# Patient Record
Sex: MALE | Race: WHITE | Employment: PART TIME | URBAN - METROPOLITAN AREA
[De-identification: names, ages, dates, MRNs, and addresses within clinical notes are randomized per-mention and may not be internally consistent; named-entity substitution may affect disease eponyms.]

---

## 2017-07-07 PROBLEM — F10.21 ALCOHOL DEPENDENCE IN REMISSION (HCC): Status: ACTIVE | Noted: 2017-07-07

## 2017-07-07 PROBLEM — F31.61 BIPOLAR AFFECTIVE DISORDER, MIXED, MILD (HCC): Status: ACTIVE | Noted: 2017-07-07

## 2018-01-15 ENCOUNTER — OFFICE VISIT (OUTPATIENT)
Dept: SURGERY | Age: 48
End: 2018-01-15

## 2018-01-15 VITALS
SYSTOLIC BLOOD PRESSURE: 133 MMHG | RESPIRATION RATE: 16 BRPM | DIASTOLIC BLOOD PRESSURE: 87 MMHG | HEART RATE: 85 BPM | HEIGHT: 69 IN | WEIGHT: 315 LBS | BODY MASS INDEX: 46.65 KG/M2 | OXYGEN SATURATION: 96 %

## 2018-01-15 DIAGNOSIS — R79.89 LOW TESTOSTERONE: ICD-10-CM

## 2018-01-15 DIAGNOSIS — R25.1 TREMORS OF NERVOUS SYSTEM: ICD-10-CM

## 2018-01-15 DIAGNOSIS — M19.042 ARTHRITIS OF BOTH HANDS: ICD-10-CM

## 2018-01-15 DIAGNOSIS — I63.9 CEREBROVASCULAR ACCIDENT (CVA), UNSPECIFIED MECHANISM (HCC): ICD-10-CM

## 2018-01-15 DIAGNOSIS — M19.041 ARTHRITIS OF BOTH HANDS: ICD-10-CM

## 2018-01-15 DIAGNOSIS — M62.08 DIASTASIS RECTI: ICD-10-CM

## 2018-01-15 DIAGNOSIS — I10 ESSENTIAL HYPERTENSION: ICD-10-CM

## 2018-01-15 DIAGNOSIS — E78.00 HYPERCHOLESTEREMIA: ICD-10-CM

## 2018-01-15 DIAGNOSIS — G47.30 SLEEP APNEA, UNSPECIFIED TYPE: ICD-10-CM

## 2018-01-15 DIAGNOSIS — F10.21 ALCOHOL DEPENDENCE IN REMISSION (HCC): ICD-10-CM

## 2018-01-15 DIAGNOSIS — F31.9 BIPOLAR 1 DISORDER (HCC): ICD-10-CM

## 2018-01-15 DIAGNOSIS — F31.61 BIPOLAR AFFECTIVE DISORDER, MIXED, MILD (HCC): ICD-10-CM

## 2018-01-15 DIAGNOSIS — E11.9 TYPE 2 DIABETES MELLITUS WITHOUT COMPLICATION, WITHOUT LONG-TERM CURRENT USE OF INSULIN (HCC): ICD-10-CM

## 2018-01-15 DIAGNOSIS — M17.0 ARTHRITIS OF BOTH KNEES: ICD-10-CM

## 2018-01-15 DIAGNOSIS — M47.819 ARTHRITIS OF SPINE: ICD-10-CM

## 2018-01-15 DIAGNOSIS — E66.01 OBESITY, MORBID (HCC): Primary | ICD-10-CM

## 2018-01-15 DIAGNOSIS — E66.01 MORBID OBESITY WITH BMI OF 45.0-49.9, ADULT (HCC): ICD-10-CM

## 2018-01-15 DIAGNOSIS — K21.9 GASTROESOPHAGEAL REFLUX DISEASE WITHOUT ESOPHAGITIS: ICD-10-CM

## 2018-01-15 DIAGNOSIS — Z86.79 HX OF CARDIOMYOPATHY: ICD-10-CM

## 2018-01-15 PROBLEM — E11.40 TYPE 2 DIABETES MELLITUS WITH DIABETIC NEUROPATHY (HCC): Status: ACTIVE | Noted: 2018-01-15

## 2018-01-15 RX ORDER — ATORVASTATIN CALCIUM 20 MG/1
40 TABLET, FILM COATED ORAL
COMMUNITY

## 2018-01-15 RX ORDER — TESTOSTERONE CYPIONATE 200 MG/ML
INJECTION INTRAMUSCULAR
COMMUNITY
End: 2019-02-06

## 2018-01-15 RX ORDER — DICLOFENAC POTASSIUM 25 MG/1
25 CAPSULE, LIQUID FILLED ORAL 4 TIMES DAILY
COMMUNITY
End: 2019-02-06

## 2018-01-15 RX ORDER — AZATHIOPRINE 50 MG/1
100 TABLET ORAL
COMMUNITY
End: 2019-02-06

## 2018-01-15 RX ORDER — VALSARTAN 80 MG/1
TABLET ORAL DAILY
COMMUNITY
End: 2019-01-31

## 2018-01-15 RX ORDER — PHENOL/SODIUM PHENOLATE
40 AEROSOL, SPRAY (ML) MUCOUS MEMBRANE 2 TIMES DAILY
COMMUNITY

## 2018-01-15 RX ORDER — METFORMIN HYDROCHLORIDE 500 MG/1
TABLET ORAL 2 TIMES DAILY WITH MEALS
Status: ON HOLD | COMMUNITY
End: 2018-05-01

## 2018-01-15 NOTE — MR AVS SNAPSHOT
Kym Hernández 
 
 
 [address] [facility] 
[phone] Patient: Issa Kimble MRN: [id number] :1970 Visit Information Date & Time Provider Department Dept. Phone Encounter #  
 1/15/2018  3:00 PM Philip Chawla MD [place] [employer] [facility] Pamela Forman [phone] [id number] Follow-up Instructions Return in about 4 months (around 5/15/2018). Your Appointments 4/10/2018  2:30 PM  
NUTRITION COUNSELING with TSS NUTRI VISIT PEN [place] [facility] Pamela Forman ([address]) Appt Note: 1/ pt [address] per Gina Wan to see dietitian every 3 months  
 [address] [facility] [address] [address]  
  
   
 [address] [place] [address] Upcoming Health Maintenance Date Due Pneumococcal 19-64 Medium Risk (1 of 1 - PPSV23) 10/13/1987 DTaP/Tdap/Td series (1 - Tdap) 10/13/1989 Influenza Age 5 to Adult 2017 Allergies as of 1/15/2018  Review Complete On: 1/15/2018 By: Philip Chawla MD  
 No Known Allergies Current Immunizations  Never Reviewed No immunizations on file. Not reviewed this visit You Were Diagnosed With   
  
 Codes Comments Hx of cardiomyopathy     ICD-10-CM: Z86.79 
ICD-9-CM: V12.59 Vitals BP Pulse Resp Height(growth percentile) Weight(growth percentile) SpO2  
 133/87 (BP 1 Location: Left arm, BP Patient Position: Sitting) 85 16 5' 9\" (1.753 m) 320 lb (145.2 kg) 96% BMI Smoking Status 47.26 kg/m2 Never Smoker Vitals History BMI and BSA Data Body Mass Index Body Surface Area  
 47.26 kg/m 2 2.66 m 2 Preferred Pharmacy Pharmacy Name Phone [address] [facility], [address] [phone] Your Updated Medication List  
  
   
 This list is accurate as of: 1/15/18  4:52 PM.  Always use your most recent med list.  
  
  
  
  
 diclofenac potassium 25 mg capsule Commonly known as:  [employer] Take 25 mg by mouth. DIOVAN 80 mg tablet Generic drug:  valsartan Take  by mouth daily. DULoxetine 60 mg capsule Commonly known as:  CYMBALTA Take 1 Cap by mouth daily. Take with 30 mg dose for a total of 90 mg  
  
 * gabapentin 600 mg tablet Commonly known as:  NEURONTIN Take 1,200 mg by mouth two (2) times a day. Indications: NEUROPATHIC PAIN  
  
 * gabapentin 600 mg tablet Commonly known as:  NEURONTIN Take 600 mg by mouth daily (with lunch). GLUCOPHAGE 500 mg tablet Generic drug:  metFORMIN Take  by mouth two (2) times daily (with meals). IMURAN 50 mg tablet Generic drug:  azaTHIOprine Take 50 mg by mouth daily. LIPITOR 20 mg tablet Generic drug:  atorvastatin Take  by mouth daily. Omeprazole delayed release 20 mg tablet Commonly known as:  PRILOSEC D/R Take 20 mg by mouth daily. OXcarbaxepine 600 mg tablet Commonly known as:  TRILEPTAL Take 1,200 mg by mouth two (2) times a day. risperiDONE 4 mg tablet Commonly known as:  RisperDAL Take 4 mg by mouth nightly. Indications: MIXED BIPOLAR I DISORDER  
  
 SEROquel 300 mg tablet Generic drug:  QUEtiapine Take 300 mg by mouth nightly. Indications: BIPOLAR DISORDER IN REMISSION  
  
 testosterone cypionate 200 mg/mL injection Commonly known as:  DEPOTESTOTERONE CYPIONATE  
by IntraMUSCular route once. traZODone 50 mg tablet Commonly known as:  Ruby Pam Take 1 Tab by mouth nightly as needed for Sleep. WELLBUTRIN  mg XL tablet Generic drug:  buPROPion XL Take 300 mg by mouth every morning. * Notice: This list has 2 medication(s) that are the same as other medications prescribed for you.  Read the directions carefully, and ask your doctor or other care provider to review them with you. Follow-up Instructions Return in about 4 months (around 5/15/2018). To-Do List   
 06/15/2018 3:30 PM  
  Appointment with Neto Antonio MD at [address] [place] [address] ([phone]) Patient Instructions Body Mass Index: Care Instructions Your Care Instructions Body mass index (BMI) can help you see if your weight is raising your risk for health problems. It uses a formula to compare how much you weigh with how tall you are. · A BMI lower than 18.5 is considered underweight. · A BMI between 18.5 and 24.9 is considered healthy. · A BMI between 25 and 29.9 is considered overweight. A BMI of 30 or higher is considered obese. If your BMI is in the normal range, it means that you have a lower risk for weight-related health problems. If your BMI is in the overweight or obese range, you may be at increased risk for weight-related health problems, such as high blood pressure, heart disease, stroke, arthritis or joint pain, and diabetes. If your BMI is in the underweight range, you may be at increased risk for health problems such as fatigue, lower protection (immunity) against illness, muscle loss, bone loss, hair loss, and hormone problems. BMI is just one measure of your risk for weight-related health problems. You may be at higher risk for health problems if you are not active, you eat an unhealthy diet, or you drink too much alcohol or use tobacco products. Follow-up care is a key part of your treatment and safety. Be sure to make and go to all appointments, and call your doctor if you are having problems. It's also a good idea to know your test results and keep a list of the medicines you take. How can you care for yourself at home? · Practice healthy eating habits. This includes eating plenty of fruits, vegetables, whole grains, lean protein, and low-fat dairy. · If your doctor recommends it, get more exercise. Walking is a good choice. Bit by bit, increase the amount you walk every day. Try for at least 30 minutes on most days of the week. · Do not smoke. Smoking can increase your risk for health problems. If you need help quitting, talk to your doctor about stop-smoking programs and medicines. These can increase your chances of quitting for good. · Limit alcohol to 2 drinks a day for men and 1 drink a day for women. Too much alcohol can cause health problems. If you have a BMI higher than 25 · Your doctor may do other tests to check your risk for weight-related health problems. This may include measuring the distance around your waist. A waist measurement of more than 40 inches in men or 35 inches in women can increase the risk of weight-related health problems. · Talk with your doctor about steps you can take to stay healthy or improve your health. You may need to make lifestyle changes to lose weight and stay healthy, such as changing your diet and getting regular exercise. If you have a BMI lower than 18.5 · Your doctor may do other tests to check your risk for health problems. · Talk with your doctor about steps you can take to stay healthy or improve your health. You may need to make lifestyle changes to gain or maintain weight and stay healthy, such as getting more healthy foods in your diet and doing exercises to build muscle. Where can you learn more? Go to http://deepak-zachary.info/. Enter S176 in the search box to learn more about \"Body Mass Index: Care Instructions. \" Current as of: October 13, 2016 Content Version: 11.4 © 1926-8133 [employer]. Care instructions adapted under license by [employer] (which disclaims liability or warranty for this information).  If you have questions about a medical condition or this instruction, always ask your healthcare professional. [employer] disclaims any warranty or liability for your use of this information. Introducing [facility]! [employer] introduces Aicent patient portal. Now you can access parts of your medical record, email your doctor's office, and request medication refills online. 1. In your internet browser, go to [email or web address] 2. Click on the First Time User? Click Here link in the Sign In box. You will see the New Member Sign Up page. 3. Enter your Aicent Access Code exactly as it appears below. You will not need to use this code after youve completed the sign-up process. If you do not sign up before the expiration date, you must request a new code. · Aicent Access Code: [email or web address] Expires: 4/11/2018  3:05 PM 
 
4. Enter the last four digits of your Social Security Number (xxxx) and Date of Birth (mm/dd/yyyy) as indicated and click Submit. You will be taken to the next sign-up page. 5. Create a Aicent ID. This will be your Aicent login ID and cannot be changed, so think of one that is secure and easy to remember. 6. Create a Aicent password. You can change your password at any time. 7. Enter your Password Reset Question and Answer. This can be used at a later time if you forget your password. 8. Enter your e-mail address. You will receive e-mail notification when new information is available in [address] 9. Click Sign Up. You can now view and download portions of your medical record. 10. Click the Download Summary menu link to download a portable copy of your medical information. If you have questions, please visit the Frequently Asked Questions section of the Aicent website. Remember, Aicent is NOT to be used for urgent needs. For medical emergencies, dial 911. Now available from your iPhone and Android! Please provide this summary of care documentation to your next provider. Your primary care clinician is listed as Tj France. If you have any questions after today's visit, please call [phone].

## 2018-01-15 NOTE — PATIENT INSTRUCTIONS
Body Mass Index: Care Instructions  Your Care Instructions    Body mass index (BMI) can help you see if your weight is raising your risk for health problems. It uses a formula to compare how much you weigh with how tall you are. · A BMI lower than 18.5 is considered underweight. · A BMI between 18.5 and 24.9 is considered healthy. · A BMI between 25 and 29.9 is considered overweight. A BMI of 30 or higher is considered obese. If your BMI is in the normal range, it means that you have a lower risk for weight-related health problems. If your BMI is in the overweight or obese range, you may be at increased risk for weight-related health problems, such as high blood pressure, heart disease, stroke, arthritis or joint pain, and diabetes. If your BMI is in the underweight range, you may be at increased risk for health problems such as fatigue, lower protection (immunity) against illness, muscle loss, bone loss, hair loss, and hormone problems. BMI is just one measure of your risk for weight-related health problems. You may be at higher risk for health problems if you are not active, you eat an unhealthy diet, or you drink too much alcohol or use tobacco products. Follow-up care is a key part of your treatment and safety. Be sure to make and go to all appointments, and call your doctor if you are having problems. It's also a good idea to know your test results and keep a list of the medicines you take. How can you care for yourself at home? · Practice healthy eating habits. This includes eating plenty of fruits, vegetables, whole grains, lean protein, and low-fat dairy. · If your doctor recommends it, get more exercise. Walking is a good choice. Bit by bit, increase the amount you walk every day. Try for at least 30 minutes on most days of the week. · Do not smoke. Smoking can increase your risk for health problems. If you need help quitting, talk to your doctor about stop-smoking programs and medicines. These can increase your chances of quitting for good. · Limit alcohol to 2 drinks a day for men and 1 drink a day for women. Too much alcohol can cause health problems. If you have a BMI higher than 25  · Your doctor may do other tests to check your risk for weight-related health problems. This may include measuring the distance around your waist. A waist measurement of more than 40 inches in men or 35 inches in women can increase the risk of weight-related health problems. · Talk with your doctor about steps you can take to stay healthy or improve your health. You may need to make lifestyle changes to lose weight and stay healthy, such as changing your diet and getting regular exercise. If you have a BMI lower than 18.5  · Your doctor may do other tests to check your risk for health problems. · Talk with your doctor about steps you can take to stay healthy or improve your health. You may need to make lifestyle changes to gain or maintain weight and stay healthy, such as getting more healthy foods in your diet and doing exercises to build muscle. Where can you learn more? Go to http://deepak-zachary.info/. Enter S176 in the search box to learn more about \"Body Mass Index: Care Instructions. \"  Current as of: October 13, 2016  Content Version: 11.4  © 4156-9147 [employer]. Care instructions adapted under license by [employer] (which disclaims liability or warranty for this information). If you have questions about a medical condition or this instruction, always ask your healthcare professional. [address] any warranty or liability for your use of this information.

## 2018-01-16 NOTE — PROGRESS NOTES
Bariatric Surgery Consultation    Subjective: The patient is a 50 y.o. obese male with a Body mass index is 47.26 kg/(m^2). Shakira Gone The patient is at his heaviest weight for the past 10 years. he has been overweight since age 25.   he has been   considering surgery since this last year. he desires surgery at this time because of multiple health concerns and their lifestyle issues which are hindered by their weight. he has been referred   by his family physician Dr Osvaldo Pablo for evaluation and treatment of their obesity via surgical intervention. Aurora Chiu has tried multiple diets in his lifetime most recently tried   physician supervised, behavior modification, unsupervised diets, Weight Watchers and Atkins    Bariatric comorbidities present are   Patient Active Problem List   Diagnosis Code    Bipolar affective disorder, mixed, mild ([facility]) F31.61    Alcohol dependence in remission ([address] F10.21    Obesity, morbid ([address] E66.01    Morbid obesity ([address] E66.01    Morbid obesity with BMI of 45.0-49.9, adult ([address] E66.01, Z68.42    Low testosterone E34.9    Bipolar 1 disorder ([address] F31.9    GERD (gastroesophageal reflux disease) K21.9    Hypertension I10    Tremors of nervous system R25.1    Hypercholesteremia E78.00    Arthritis of both knees M17.0    Arthritis of spine ([address] M46.90    Arthritis of both hands M19.041, M19.042    Migraines G43.909    Diabetes ([address] E11.9    Sleep apnea G47.30    Hx of cardiomyopathy Z86.79    Diastasis recti M62.08    Type 2 diabetes mellitus with diabetic neuropathy (HCC) E11.40    CVA (cerebral vascular accident) ([address] I63.9       The patient is considering laparoscopic sleeve gastrectomy for surgical weight loss due to their ineffective progress with medical forms of weight loss and the urging of their physician who cares for   their primary medical issues.  The patient  now presents  for consideration for weight loss surgery understanding the benefits of this over a medical approach of weight loss as was discussed in our   presentation on weight loss surgery. They have discussed their plans both with their family and primary care physician who is in support of their pursuit of such. The patient has had many health   issues as of late but denies and gastrointestinal disturbances other than what is outlined below in their review of symptoms. All of their prior evaluations available by both their PCP's and specialists   physicians have been reviewed today either in the Care Everywhere portal or scanned under the media tab. I have spent a large portion of my initial consultation today reviewing the patients current dietary habits which have contributed to their health issues and obesity. I have suggested to them personally a dietary regimen that they can initiate now to help with their status as it pertains to their weight. They understand that the most important aspect of their journey   through their weight loss endeavor will be their adherence to a new lifestyle of healthy eating behavior. They also understand that an adherence to an exercise program will not only help with weight loss but is ultimately important in weight maintenance. The patients goal weight is 190lb. These goals are consistent with expected outcomes of their desired operation. his Medical goals are resolution of these health issues.         Patient Active Problem List    Diagnosis Date Noted    Obesity, morbid ([address] 01/15/2018    Type 2 diabetes mellitus with diabetic neuropathy ([facility] 01/15/2018    Morbid obesity ([address]     Morbid obesity with BMI of 45.0-49.9, adult (HCC)     Low testosterone     Bipolar 1 disorder (HCC)     GERD (gastroesophageal reflux disease)     Hypertension     Tremors of nervous system     Hypercholesteremia     Arthritis of both knees     Arthritis of spine (HCC)     Arthritis of both hands     Migraines     Diabetes ([address]     Sleep apnea     Hx of cardiomyopathy     Diastasis recti     [facility]ce[facility]) [facility]     Bipolar affective disorder, mixed, mild ([facility] 07/07/2017    [facility] dependence [facility] remission [facility] 07/07/2017     Past Surgical History:   Procedure Laterality Date    HX APPENDECTOMY      HX HEENT      TONSILS    HX ORTHOPAEDIC      CTR both hands    HX ORTHOPAEDIC      cervical fusion    HX ORTHOPAEDIC      TORN MENISCUS      Social History   Substance Use Topics    Smoking status: Never Smoker    Smokeless tobacco: Never Used    Alcohol use No      Comment: recovering alcoholic      Family History   Problem Relation Age of Onset    Heart Disease Mother     Hypertension Mother     Elevated Lipids Mother     Heart Disease Father     Hypertension Father     Elevated Lipids Father       Current Outpatient Prescriptions   Medication Sig Dispense Refill    valsartan (DIOVAN) 80 mg tablet Take  by mouth daily.  metFORMIN (GLUCOPHAGE) 500 mg tablet Take  by mouth two (2) times daily (with meals).  Omeprazole delayed release (PRILOSEC D/R) 20 mg tablet Take 20 mg by mouth daily.  atorvastatin (LIPITOR) 20 mg tablet Take  by mouth daily.  diclofenac potassium (ZIPSOR) 25 mg capsule Take 25 mg by mouth.  azaTHIOprine (IMURAN) 50 mg tablet Take 50 mg by mouth daily.  testosterone cypionate (DEPOTESTOTERONE CYPIONATE) 200 mg/mL injection by IntraMUSCular route once.  traZODone (DESYREL) 50 mg tablet Take 1 Tab by mouth nightly as needed for Sleep. 30 Tab 5    DULoxetine (CYMBALTA) 60 mg capsule Take 1 Cap by mouth daily. Take with 30 mg dose for a total of 90 mg (Patient taking differently: Take 60 mg by mouth daily.) 30 Cap 8    risperiDONE (RISPERDAL) 4 mg tablet Take 4 mg by mouth nightly. Indications: MIXED BIPOLAR I DISORDER      QUEtiapine (SEROQUEL) 300 mg tablet Take 300 mg by mouth nightly.  Indications: BIPOLAR DISORDER IN REMISSION      buPROPion XL (WELLBUTRIN XL) 300 mg XL tablet Take 300 mg by mouth every morning.  OXcarbaxepine (TRILEPTAL) 600 mg tablet Take 1,200 mg by mouth two (2) times a day.  gabapentin (NEURONTIN) 600 mg tablet Take 1,200 mg by mouth two (2) times a day. Indications: NEUROPATHIC PAIN      gabapentin (NEURONTIN) 600 mg tablet Take 600 mg by mouth daily (with lunch).        No Known Allergies       Review of Systems:       General - No history or complaints of unexpected fever, chills, or weight loss  Head/Neck - No history or complaints of headache, diplopia, dysphagia, hearing loss  Cardiac - No history or complaints of chest pain, palpitations, murmur, or shortness of breath  Pulmonary - No history or complaints of shortness of breath, productive cough, hemoptysis  Gastrointestinal - moderate reflux,no  abdominal pain, obstipation/constipation or blood per rectum  Genitourinary - No history or complaints of hematuria/dysuria, stress urinary incontinence symptoms, or renal lithiasis  Musculoskeletal - severe joint pain in their knees,  no muscular weakness  Hematologic - No history or complaints of bleeding disorders,  No blood transfusions  Neurologic - No history or complaints of  migraine headaches, seizure activity, syncopal episodes, TIA or stroke  Integumentary - No history or complaints of rashes, abnormal nevi, skin cancer  Gynecological - n/a               Objective:     Visit Vitals    /87 (BP 1 Location: Left arm, BP Patient Position: Sitting)    Pulse 85    Resp 16    Ht 5' 9\" (1.753 m)    Wt 145.2 kg (320 lb)    SpO2 96%    BMI 47.26 kg/m2       Physical Examination: General appearance - alert, well appearing, and in no distress and oriented to person, place, and time  Mental status - alert, oriented to person, place, and time, normal mood, behavior, speech, dress, motor activity, and thought processes  Eyes - pupils equal and reactive, extraocular eye movements intact, sclera anicteric, left eye normal, right eye normal  Ears - bilateral TM's and external ear canals normal, right ear normal, left ear normal  Nose - normal and patent, no erythema, discharge or polyps  Mouth - mucous membranes moist, pharynx normal without lesions  Neck - supple, no significant adenopathy  Lymphatics - no palpable lymphadenopathy, no hepatosplenomegaly  Chest - clear to auscultation, no wheezes, rales or rhonchi, symmetric air entry  Heart - normal rate, regular rhythm, normal S1, S2, no murmurs, rubs, clicks or gallops  Abdomen - soft, nontender, nondistended, no masses or organomegaly, massive central obesity with sever diastasis recti  Back exam - full range of motion, no tenderness, palpable spasm or pain on motion  Neurological - alert, oriented, normal speech, no focal findings or movement disorder noted  Musculoskeletal - abnormal exam of both lower extremities with limited ROM  Extremities - pedal edema 1+     Labs:       No results found for this or any previous visit (from the past 1440 hour(s)). Assessment:     Morbid obesity with comorbidity    Plan:     laparoscopic sleeve gastrectomy    This is a 50 y.o. male with a BMI of Body mass index is 47.26 kg/(m^2). and the weight-related co-morbidties as noted below. Ed Oniel meets the NIH criteria for bariatric surgery based upon the BMI of Body mass index is 47.26 kg/(m^2). and multiple weight-related co-morbidties. Malcolm Engle has elected laparoscopic sleeve gastrectomy as his intervention of choice for treatment of morbid obestiy through surgical means secondary to its safety profile, rapid return to work  and decreases in operative risks over gastric bypass. In the office today, following Melo's history and physical examination, a 30 minute discussion regarding the anatomic alterations for the laparoscopic sleeve gastrectomy was undertaken.  The dietary expectations and the patient and physician dependent factors for success were thoroughly discussed, to include the need for interval follow-up and long-term dietary changes associated with success. The possible complications of the sleeve gastrectomy  were also discussed, to include;death, DVT/PE, staple line leak, bleeding, stricture formation, infection, nutritional deficiencies and sleeve dilation. Specific weight related outcomes for success were also discussed with an emphasis on careful and close follow-up with the first year and eating behavior modification as the baseline and cyclical hunger return. The patient expressed an understanding of the above factors, and his questions were answered in their entirety. In addition, the patient attended a 1.5 hour power point seminar regarding obesity, surgical weight loss including, adjustable gastric band, gastric bypass, and sleeve gastrectomy. This discussion contrasted the different surgical techniques, mechanisms of actions and expected outcomes, and surgical and medical risks associated with each procedure. During this seminar, there was a long question and answer session where each questions was answered until there were no additional questions. Today, the patient had all of his questions answered and desires to proceed with  bariatric surgery initially choosing sleeve gastrectomy as his surgical option. Due to the patients extreme health issues he will need to be cleared for surgery from a cardiac standpoint and definitely need to lose weight in this 1 year process. Secondary Diagnoses:     Adult Onset Diabetes - The patient has vinicius given a very low carbohydrate diet preoperatively along with instructions to monitor their blood sugars on a regular daily basis.  When  their surgery is performed  we will be monitoring the patient with sliding scale insulin and accuchecks.  Based on those values we will determine whether the patient needs a reduction of those medications postoperatively or total removal of those medications on discharge.  We will have the patient continue accuchecks postoperatively while at home also and report to me or their family physician for appropriate adjustments as needed.  The patient also understands that in the event of uncontrolled blood sugar preoperatively that we may choose to postpone their surgery. Hypertension - The patient has a clear understanding of how weight loss improves hypertension as a whole, but also they understand that there is a significant genetic component to this disease process. We will monitor the patients blood pressure while in the hospital and the plan would be to continue those medications postoperatively.  If a diuretic is being used we will stop them on discharge to prevent dehydration particularly with the sleeve gastrectomy and the gastric bypass procedures.  They will be instructed to monitor their blood pressure postoperatively while at home and notify their primary care physician in the event of any significantly high or uncharacteristic readings. Hyperlipidemia - The patient understands that studies show that almost all patient will realize an improvement in their lipid profile with weight loss that occurs with these procedures. They however also understand that hyperlipidemia is a multifactorial disease particularly as it pertains to their genetic background and that there is no guarantee toward cure  of this issue. We will resume their medications immediately postoperatively as this tends to decrease any post operative cardiac events.  The patient will follow up with their family physician in the postoperative period with plans to repeat their lipid panel 2-3 month postoperative for potential adjustment or removal of these medications. Coronary Artery Disease - The patient has undergone or will undergo a preoperative evaluation by a cardiologist such that they are deemed a reasonable candidate for surgery.  The patient understands that with a history of cardiac disease that there is always an increased risk compared to the average patient. Appropriate recommendations have been followed as recommended by the cardiologist.  The patients ASA will be resumed approximately 1 month postoperatively in a coated form. The patient will be sent for cardiac evaluation due to his prior history of cardiomyopathy. Dietary Intervention  - The patient is currently scheduled to see or has been followed by a bariatric nutritionist for an attempt at preoperative weight loss as has been dictated by their insurance carrier. They will be assessed at various times during their follow up to evaluate their progress depending on the length of time that is required once again by their carrier. I have explained the importance of preoperative weight loss and the benefits regarding lower surgical risk and also assisting the patient in reaching their weight loss goal.  Finally they understand there is a physiologic benefit from the standpoint of hepatic volume reduction and reduction of central visceral adiposity preoperatively. I have reiterated the importance of a low carbohydrate and high protein regimen to achieve their stated goal. I have reviewed their current eating behavior prior to this encounter and explained to them in an exhaustive fashion the appropriate diet that they should adhere to. They have been encouraged to loose weight pre operatively and understand it is our prerogative to cancel surgery or postpone their procedure in the event of significant weight gain. The patients weight loss goal pre operatively is 20-25 pounds.     GERD -The patient understands that weight loss surgery is not a guaranteed cure for reflux disease but does understand the benefits that weight loss can have on reflux disease.  They also understand that at the time of surgery the gastroesophageal junction will be evaluated for the presence of a diaphragmatic hernia.  Hernias will be corrected always with the gastric band and sleeve gastrectomy procedures, but only on a case by case basis with the gastric bypass if it prevents our ability to perform the operation at hand, or if I feel that they would benefit long term with correction of this issue.  The patient also understands that neither weight loss surgery nor repair of a diaphragmatic hernia repair guarantees the complete cessation of the disease. They also understand there is a possibility of recurrence with a simple crural repair as is performed with these procedures. They understand they may have to continue their medications in the postoperative period. They have a good understanding that the gastric bypass procedure is better suited to total resolution of this issue and that neither the Lap Band nor sleeve gastrectomy is considered a curative procedure as it pertains to this diagnosis. Weight Related Arthritis -The patient understands the benefits that weight loss surgery can have on their arthritis but also understands that weight loss is not a guaranteed cure and relief of symptoms is often dependent on the severity of the underlying disease.  The patient also understands that traditional pharmaceutical treatments for this diagnosis are usually unavailable to post-operative weight loss patients due to the effects on the gastrointestinal tract particularly with the gastric bypass and to a lesser effect with the sleeve gastrectomy.  Any changes to the patients medication treatment will ultimately be made the patients PCP with input by our office. Obstructive Sleep Apnea -The patient understands the association of sleep apnea and obesity and the additional risk that it caries related to post surgical complications. If they have not been tested for sleep apnea and I feel they are at increased risk for this diagnosis, then they will be scheduled for a consultation with a Pulmonologist for such.  In the event that they elyssa this diagnosis we will have the patient bring their CPAP machine to the hospital for use both postoperatively in the PACU and on the floor at its appropriate setting.  We will have them continue using it while at home after surgery and follow up with their pulmonologist 6 months after to be retested to see if it can be discontinued at that time period.       Signed By: Robin Cook MD     January 15, 2018

## 2018-01-24 ENCOUNTER — HOSPITAL ENCOUNTER (OUTPATIENT)
Age: 48
Setting detail: OUTPATIENT SURGERY
Discharge: HOME | End: 2018-01-24
Attending: SPECIALIST | Admitting: SPECIALIST
Payer: COMMERCIAL

## 2018-01-24 ENCOUNTER — APPOINTMENT (OUTPATIENT)
Dept: GENERAL RADIOLOGY | Age: 48
End: 2018-01-24
Attending: SPECIALIST
Payer: COMMERCIAL

## 2018-01-24 VITALS
TEMPERATURE: 97.8 F | OXYGEN SATURATION: 93 % | HEIGHT: 69 IN | DIASTOLIC BLOOD PRESSURE: 93 MMHG | BODY MASS INDEX: 46.65 KG/M2 | RESPIRATION RATE: 22 BRPM | HEART RATE: 87 BPM | WEIGHT: 315 LBS | SYSTOLIC BLOOD PRESSURE: 149 MMHG

## 2018-01-24 DIAGNOSIS — E66.01 MORBID OBESITY (HCC): ICD-10-CM

## 2018-01-24 PROCEDURE — 74240 X-RAY XM UPR GI TRC 1CNTRST: CPT

## 2018-01-24 PROCEDURE — 74011000255 HC RX REV CODE- 255: Performed by: SPECIALIST

## 2018-01-24 PROCEDURE — 76040000019: Performed by: SPECIALIST

## 2018-01-24 NOTE — IP AVS SNAPSHOT
Perfecto Collazo 
 
 
 [address] [facility] [address] 
[phone] Patient: Shahab Castle MRN: [id number] :1970 About your hospitalization You were admitted on:  2018 You last received care in the:  [facility] ENDOSCOPY You were discharged on:  2018 Why you were hospitalized Your primary diagnosis was:  Not on File Follow-up Information None Discharge Orders None A check ulises indicates which time of day the medication should be taken. My Medications ASK your doctor about these medications Instructions Each Dose to Equal  
 Morning Noon Evening Bedtime  
 diclofenac potassium 25 mg capsule Commonly known as:  [employer] Your last dose was: Your next dose is: Take 25 mg by mouth. 25 mg  
    
   
   
   
  
 DIOVAN 80 mg tablet Generic drug:  valsartan Your last dose was: Your next dose is: Take  by mouth daily. DULoxetine 60 mg capsule Commonly known as:  CYMBALTA Your last dose was: Your next dose is: Take 1 Cap by mouth daily. Take with 30 mg dose for a total of 90 mg  
 60 mg  
    
   
   
   
  
 * gabapentin 600 mg tablet Commonly known as:  NEURONTIN Your last dose was: Your next dose is: Take 1,200 mg by mouth two (2) times a day. Indications: NEUROPATHIC PAIN  
 1200 mg  
    
   
   
   
  
 * gabapentin 600 mg tablet Commonly known as:  NEURONTIN Your last dose was: Your next dose is: Take 600 mg by mouth daily (with lunch). 600 mg GLUCOPHAGE 500 mg tablet Generic drug:  metFORMIN Your last dose was: Your next dose is: Take  by mouth two (2) times daily (with meals). IMURAN 50 mg tablet Generic drug:  azaTHIOprine Your last dose was: Your next dose is: Take 50 mg by mouth daily. 50 mg  
    
   
   
   
  
 LIPITOR 20 mg tablet Generic drug:  atorvastatin Your last dose was: Your next dose is: Take  by mouth daily. Omeprazole delayed release 20 mg tablet Commonly known as:  PRILOSEC D/R Your last dose was: Your next dose is: Take 20 mg by mouth daily. 20 mg OXcarbaxepine 600 mg tablet Commonly known as:  TRILEPTAL Your last dose was: Your next dose is: Take 1,200 mg by mouth two (2) times a day. 1200 mg  
    
   
   
   
  
 risperiDONE 4 mg tablet Commonly known as:  RisperDAL Your last dose was: Your next dose is: Take 4 mg by mouth nightly. Indications: MIXED BIPOLAR I DISORDER  
 4 mg SEROquel 300 mg tablet Generic drug:  QUEtiapine Your last dose was: Your next dose is: Take 300 mg by mouth nightly. Indications: BIPOLAR DISORDER IN REMISSION  
 300 mg  
    
   
   
   
  
 testosterone cypionate 200 mg/mL injection Commonly known as:  DEPOTESTOTERONE CYPIONATE Your last dose was: Your next dose is:    
   
   
 by IntraMUSCular route once. traZODone 50 mg tablet Commonly known as:  Lien Bunch Your last dose was: Your next dose is: Take 1 Tab by mouth nightly as needed for Sleep. 50 mg WELLBUTRIN  mg XL tablet Generic drug:  buPROPion XL Your last dose was: Your next dose is: Take 300 mg by mouth every morning. 300 mg * Notice: This list has 2 medication(s) that are the same as other medications prescribed for you. Read the directions carefully, and ask your doctor or other care provider to review them with you. Discharge Instructions Verbal and written post adjustment / UGI instructions given. Patient acknowledges understanding. Discussed diet, activities, and s/s that should be reported. Encouraged to call to schedule next appointment and to call with any questions or concerns. Introducing [facility]! Romeo Chowdhury introduces [employer] patient portal. Now you can access parts of your medical record, email your doctor's office, and request medication refills online. 1. In your internet browser, go to [email or web address]"[email or web address]. Downstream/"[employer] 2. Click on the First Time User? Click Here link in the Sign In box. You will see the New Member Sign Up page. 3. Enter your [employer] Access Code exactly as it appears below. You will not need to use this code after youve completed the sign-up process. If you do not sign up before the expiration date, you must request a new code. · [employer] Access Code: [email or web address] Expires: 4/11/2018  3:05 PM 
 
4. Enter the last four digits of your Social Security Number (xxxx) and Date of Birth (mm/dd/yyyy) as indicated and click Submit. You will be taken to the next sign-up page. 5. Create a [employer] ID. This will be your [employer] login ID and cannot be changed, so think of one that is secure and easy to remember. 6. Create a [employer] password. You can change your password at any time. 7. Enter your Password Reset Question and Answer. This can be used at a later time if you forget your password. 8. Enter your e-mail address. You will receive e-mail notification when new information is available in [address] 9. Click Sign Up. You can now view and download portions of your medical record. 10. Click the Download Summary menu link to download a portable copy of your medical information. If you have questions, please visit the Frequently Asked Questions section of the [employer] website.  Remember, [employer] is NOT to be used for urgent needs. For medical emergencies, dial 911. Now available from your iPhone and Android! Providers Seen During Your Hospitalization Provider Specialty Primary office phone Philip Chawla MD General Surgery [phone] Your Primary Care Physician (PCP) Primary Care Physician Office Phone Office Fax Iris Chiu [phone] You are allergic to the following No active allergies Recent Documentation Height Weight BMI Smoking Status 1.753 m 147.1 kg 47.89 kg/m2 Never Smoker Emergency Contacts Name Discharge Info Relation Home Work Mobile CarlosMally DISCHARGE CAREGIVER [3] Spouse [3] [phone] Patient Belongings The following personal items are in your possession at time of discharge: 
                             
 
  
  
 Please provide this summary of care documentation to your next provider. Signatures-by signing, you are acknowledging that this After Visit Summary has been reviewed with you and you have received a copy. Patient Signature:  ____________________________________________________________ Date:  ____________________________________________________________  
  
Salomon Chowdary Provider Signature:  ____________________________________________________________ Date:  ____________________________________________________________

## 2018-01-24 NOTE — PROCEDURES
Patient:Melo Gonzalez   : 1970  Medical Record [id number]            PREPROCEDURE DIAGNOSIS: This patient is preoperative for laparoscopic sleeve gastrectomyprocedure with a history of  reflux disease. POSTPROCEDURE DIAGNOSIS: This patient is preoperative for laparoscopic sleeve gastrectomyprocedure with a history of  reflux disease. PROCEDURES PERFORMED: Upper GI study with barium. ESTIMATED BLOOD LOSS: None. SPECIMENS: None. STATEMENT OF MEDICAL NECESSITY: The patient is a patient with a  longstanding history of obesity. They are now considering the laparoscopic sleeve gastrectomyprocedure as a means of surgical weight control and due to their history of reflux disease and are being assessed preoperatively for such. DESCRIPTION OF PROCEDURE: The patient was brought to the fluoroscopy unit and  was given thin barium. On swallowing of barium, they were noted to have  normal peristalsis of their esophagus. They had prompt filling of distal  esophagus with tapering into the gastroesophageal junction. There was no evidence of a hiatal hernia present. Contrast then filled the gastric cardia, fundus,body and pre pyloric region with no abnormalities noted. Contrast then exited the pylorus in normal fashion. No obstruction was noted. There was no evidence of reflux noted.     (normal anatomy)    Mustapha Silva MD

## 2018-04-10 ENCOUNTER — CLINICAL SUPPORT (OUTPATIENT)
Dept: SURGERY | Age: 48
End: 2018-04-10

## 2018-04-10 VITALS — BODY MASS INDEX: 46.65 KG/M2 | WEIGHT: 315 LBS | HEIGHT: 69 IN

## 2018-04-10 DIAGNOSIS — E66.01 MORBID OBESITY WITH BMI OF 45.0-49.9, ADULT (HCC): Primary | ICD-10-CM

## 2018-04-10 NOTE — PATIENT INSTRUCTIONS
Goals: 1. Decrease eating speed. You can do this by putting your utensil down between bites, using smaller utensils like baby or cocktail spoons, and chewing each bite thoroughly (25-30 chews/bite). Try to take at least 20 minutes to eat a meal, preferably work up to 25-30 minutes. 2. Focusing on protein should also help with fullness. 3. Try chocolate protein shake as your creamer. 4. Start chair exercises 2-3 days per week. Do these in the afternoon before dinner- right after work. 5. Follow a very low carbohydrate diet with an emphasis on protein and decreased carbohydrate.

## 2018-04-10 NOTE — PROGRESS NOTES
Medical Weight Loss Multi-Disciplinary Program    Name: Kalyani Pinto   : 1970    Session# 1  Date: 4/10/2018     Height: 5' 9\" (175.3 cm)    Weight: 147 kg (324 lb) lbs. Body mass index is 47.85 kg/(m^2). Pt to lose weight prior to surgery from a starting weight of 320 lbs. He is having monthly nutrition phone consults for his [facility]. To see RD in our office every 3 months until surgery. Dietary Instructions    Reviewed intake  Understanding low carbohydrates, low sugar, higher protein meals  Understanding proper portions  Instruction given for personal dietary changes  Comments: Pt given brief pre/post-op diet ed and diet hx reviewed. Physical Activity/Exercise    Discussed Perceived Compliance  Reasonable Goals Set  Motivation  Comments: Pt is not currently exercising. Set goal to start chair exercises 2-3 days per week. Do these in the afternoon before dinner- right after work. Gave pt YouTube websites on handout and chair exercise packet. Behavior Modification    Positive attitude  Comments: Pt is working on the following goals:    1. Decrease eating speed. You can do this by putting your utensil down between bites, using smaller utensils like baby or cocktail spoons, and chewing each bite thoroughly (25-30 chews/bite). Try to take at least 20 minutes to eat a meal, preferably work up to 25-30 minutes. 2. Focusing on protein should also help with fullness. 3. Try chocolate protein shake as your creamer. 4. Start chair exercises 2-3 days per week. Do these in the afternoon before dinner- right after work. 5. Follow a very low carbohydrate diet with an emphasis on protein and decreased carbohydrate. Candidate for surgery (per RD): pending    Dietitian: King Jessica RD     Kalyani Pinto is a 50 y.o. male who present for a pre-op evaluation.     Visit Vitals    Ht 5' 9\" (1.753 m)    Wt 147 kg (324 lb)    BMI 47.85 kg/m2     Past Medical History: Diagnosis Date    Arthritis of both hands     Arthritis of both knees     [facility]     Bipolar 1 disorder ([facility]     CVA (cerebral vascular accident) ([facility]     2016- due to vasculitis    Diabetes ([facility]     Diastasis recti     GERD (gastroesophageal reflux disease)     Hx of cardiomyopathy     ? reason for diagnosis 10 years ago    Hypercholesteremia     Hypertension     Low testosterone     Migraines     Morbid obesity ([facility]     Morbid obesity with BMI of 45.0-49.9, adult ([facility]     Sleep apnea     Tremors of nervous system            Procedure:  laparoscopic sleeve gastrectomy     Reasons for Surgery:  BMI > 40 with one or more medically significant comorbidities    Summary:  Pt given brief pre/post-op diet ed and diet hx reviewed. Pt instructed to follow a low calorie, low carbohydrate, high protein diet of about 6499-3231 calories daily. Pt set several goals. See below. Current Vitamins: multivitamin, iron OTC as was low at one point      What have you done in the past to try to lose weight? Portion control, meal program     Why didn't you lose weight or keep the weight off?: Medication- prednisone, anti-depressants for bipolar, hard to control portions as always feels hungry, habits, stopped exercising when got out of school and got , family culture of clearing his plate, habits developed in high school as a runner to eat large portions      Patient Education and Materials Provided:  Supplement [facility], B Vitamin Information, MVI Recommendations, Calcium Citrate Information, Bariatric Supplement Companies, Protein Supplement Information, Fluid Requirements, No Caffeine or Carbonation, No Alcohol for One Year Post Op, 3 Balanced Meals a Day, Food Group Guide, Exercising and Addressed Current Habits / Changes to make    Nutritional Hx: What is the number of meals you eat per day? 3    Do you eat between meals / snack? yes    How fast do you eat your meals? rapid    How often do you eat fast food? occasionally- none the last two weeks. Sometimes does go through wanting chicken nuggets     How many sodas/sugared beverages do you drink per day? Diet soda sometimes- 3 times per week, regular soda once or twice per week     How many caffeinated drinks do you have per day? Coffee- normally 1 per day- flavored with 4 packets of sugar or chocolate creamer     How much milk and/or juice do you drink per day? Milk in cereal, orange juice once in a while     How much water do you drink per day? 4-6 (8oz glasses)    How often do you consume alcohol? never;    Diet History:   Breakfast  What are you eating and how much? Bowl of cereal chocolate cheerios- 2 servings, with 2% milk    When? 7:15 am    Where? iii   Snacks  What are you eating and how much? Non-fat greek yogurt    When? 10 am   Where? iii   Hydration  What are you eating and how much? Water at work- two ulloa before lunch is the goal- 16 oz    When? ii   Where? ii   Lunch  What are you eating and how much? [place]- with miracle whip, turkey, 1 slice cheese, on 12 grain bread   When? noon   Where? iii   Snacks  What are you eating and how much? Fruit cup in natural juice    When? Sometimes noon   Where? iii   Hydration  What are you eating and how much? Water to drink, sometimes a regular soda    When? ii   Where? iii   Dinner  What are you eating and how much? Bowl of chili with cheese and a small pack of ritz crackers- fresh stack of ritz crackers (about 10 crackers)   When? 6 pm    Where? iii   Snacks  What are you eating and how much? Animal crackers small pack-20 animal crackers    When? 6:30 pm   Where? iii   Hydration  What are you eating and how much? Sometimes Diet Mt. Dew, sugar free orange drink    When? ii   Where? iii     Exercise:  Do you currently have an exercise routine? no    Goals:   1. Decrease eating speed.  You can do this by putting your utensil down between bites, using smaller utensils like baby or cocktail spoons, and chewing each bite thoroughly (25-30 chews/bite). Try to take at least 20 minutes to eat a meal, preferably work up to 25-30 minutes. 2. Focusing on protein should also help with fullness. 3. Try chocolate protein shake as your creamer. 4. Start chair exercises 2-3 days per week. Do these in the afternoon before dinner- right after work. 5. Follow a very low carbohydrate diet with an emphasis on protein and decreased carbohydrate.

## 2018-04-30 RX ORDER — GUAIFENESIN 100 MG/5ML
81 LIQUID (ML) ORAL DAILY
COMMUNITY
End: 2019-02-06

## 2018-04-30 RX ORDER — METOPROLOL TARTRATE 25 MG/1
12.5 TABLET, FILM COATED ORAL 2 TIMES DAILY
COMMUNITY
End: 2018-05-01

## 2018-05-01 ENCOUNTER — HOSPITAL ENCOUNTER (OUTPATIENT)
Dept: CARDIAC CATH/INVASIVE PROCEDURES | Age: 48
Discharge: HOME | End: 2018-05-01
Attending: INTERNAL MEDICINE | Admitting: INTERNAL MEDICINE
Payer: COMMERCIAL

## 2018-05-01 VITALS
WEIGHT: 315 LBS | RESPIRATION RATE: 20 BRPM | DIASTOLIC BLOOD PRESSURE: 72 MMHG | HEART RATE: 84 BPM | HEIGHT: 69 IN | OXYGEN SATURATION: 90 % | SYSTOLIC BLOOD PRESSURE: 119 MMHG | TEMPERATURE: 97.5 F | BODY MASS INDEX: 46.65 KG/M2

## 2018-05-01 LAB — GLUCOSE BLD STRIP.AUTO-MCNC: 105 MG/DL (ref 70–110)

## 2018-05-01 PROCEDURE — 82962 GLUCOSE BLOOD TEST: CPT

## 2018-05-01 PROCEDURE — 77030010221 CARDIAC CATHETERIZATION

## 2018-05-01 PROCEDURE — 74011000250 HC RX REV CODE- 250

## 2018-05-01 PROCEDURE — 74011000250 HC RX REV CODE- 250: Performed by: INTERNAL MEDICINE

## 2018-05-01 PROCEDURE — 74011636320 HC RX REV CODE- 636/320: Performed by: INTERNAL MEDICINE

## 2018-05-01 PROCEDURE — 74011250636 HC RX REV CODE- 250/636: Performed by: INTERNAL MEDICINE

## 2018-05-01 PROCEDURE — 74011250636 HC RX REV CODE- 250/636

## 2018-05-01 RX ORDER — MIDAZOLAM HYDROCHLORIDE 1 MG/ML
INJECTION, SOLUTION INTRAMUSCULAR; INTRAVENOUS
Status: COMPLETED
Start: 2018-05-01 | End: 2018-05-01

## 2018-05-01 RX ORDER — VERAPAMIL HYDROCHLORIDE 2.5 MG/ML
INJECTION, SOLUTION INTRAVENOUS
Status: DISCONTINUED
Start: 2018-05-01 | End: 2018-05-01 | Stop reason: HOSPADM

## 2018-05-01 RX ORDER — SODIUM CHLORIDE 0.9 % (FLUSH) 0.9 %
5-10 SYRINGE (ML) INJECTION EVERY 8 HOURS
Status: DISCONTINUED | OUTPATIENT
Start: 2018-05-01 | End: 2018-05-01 | Stop reason: HOSPADM

## 2018-05-01 RX ORDER — EPTIFIBATIDE 2 MG/ML
180 INJECTION, SOLUTION INTRAVENOUS
Status: DISCONTINUED | OUTPATIENT
Start: 2018-05-01 | End: 2018-05-01 | Stop reason: HOSPADM

## 2018-05-01 RX ORDER — MIDAZOLAM HYDROCHLORIDE 1 MG/ML
.5-2 INJECTION, SOLUTION INTRAMUSCULAR; INTRAVENOUS
Status: DISCONTINUED | OUTPATIENT
Start: 2018-05-01 | End: 2018-05-01 | Stop reason: HOSPADM

## 2018-05-01 RX ORDER — HEPARIN SODIUM 200 [USP'U]/100ML
INJECTION, SOLUTION INTRAVENOUS
Status: COMPLETED
Start: 2018-05-01 | End: 2018-05-01

## 2018-05-01 RX ORDER — EPTIFIBATIDE 0.75 MG/ML
2 INJECTION, SOLUTION INTRAVENOUS CONTINUOUS
Status: DISCONTINUED | OUTPATIENT
Start: 2018-05-01 | End: 2018-05-01 | Stop reason: HOSPADM

## 2018-05-01 RX ORDER — SODIUM CHLORIDE 0.9 % (FLUSH) 0.9 %
5-10 SYRINGE (ML) INJECTION AS NEEDED
Status: DISCONTINUED | OUTPATIENT
Start: 2018-05-01 | End: 2018-05-01 | Stop reason: HOSPADM

## 2018-05-01 RX ORDER — HEPARIN SODIUM 1000 [USP'U]/ML
INJECTION, SOLUTION INTRAVENOUS; SUBCUTANEOUS
Status: COMPLETED
Start: 2018-05-01 | End: 2018-05-01

## 2018-05-01 RX ORDER — LIDOCAINE HYDROCHLORIDE 10 MG/ML
3-20 INJECTION, SOLUTION EPIDURAL; INFILTRATION; INTRACAUDAL; PERINEURAL ONCE
Status: COMPLETED | OUTPATIENT
Start: 2018-05-01 | End: 2018-05-01

## 2018-05-01 RX ORDER — SODIUM CHLORIDE 9 MG/ML
75 INJECTION, SOLUTION INTRAVENOUS CONTINUOUS
Status: DISPENSED | OUTPATIENT
Start: 2018-05-01 | End: 2018-05-01

## 2018-05-01 RX ORDER — HEPARIN SODIUM 200 [USP'U]/100ML
500 INJECTION, SOLUTION INTRAVENOUS
Status: DISCONTINUED | OUTPATIENT
Start: 2018-05-01 | End: 2018-05-01 | Stop reason: HOSPADM

## 2018-05-01 RX ORDER — FENTANYL CITRATE 50 UG/ML
INJECTION, SOLUTION INTRAMUSCULAR; INTRAVENOUS
Status: COMPLETED
Start: 2018-05-01 | End: 2018-05-01

## 2018-05-01 RX ORDER — SODIUM CHLORIDE 9 MG/ML
50 INJECTION, SOLUTION INTRAVENOUS CONTINUOUS
Status: DISCONTINUED | OUTPATIENT
Start: 2018-05-01 | End: 2018-05-01 | Stop reason: HOSPADM

## 2018-05-01 RX ORDER — METFORMIN HYDROCHLORIDE 500 MG/1
500 TABLET ORAL 2 TIMES DAILY WITH MEALS
Qty: 30 TAB | Refills: 0 | Status: SHIPPED | OUTPATIENT
Start: 2018-05-03 | End: 2019-02-06

## 2018-05-01 RX ORDER — FENTANYL CITRATE 50 UG/ML
25-100 INJECTION, SOLUTION INTRAMUSCULAR; INTRAVENOUS
Status: DISCONTINUED | OUTPATIENT
Start: 2018-05-01 | End: 2018-05-01 | Stop reason: HOSPADM

## 2018-05-01 RX ORDER — HEPARIN SODIUM 1000 [USP'U]/ML
1000-4000 INJECTION, SOLUTION INTRAVENOUS; SUBCUTANEOUS
Status: DISCONTINUED | OUTPATIENT
Start: 2018-05-01 | End: 2018-05-01 | Stop reason: HOSPADM

## 2018-05-01 RX ORDER — LIDOCAINE HYDROCHLORIDE 10 MG/ML
INJECTION, SOLUTION EPIDURAL; INFILTRATION; INTRACAUDAL; PERINEURAL
Status: COMPLETED
Start: 2018-05-01 | End: 2018-05-01

## 2018-05-01 RX ADMIN — VERAPAMIL HYDROCHLORIDE 3 ML: 2.5 INJECTION INTRAVENOUS at 11:06

## 2018-05-01 RX ADMIN — FENTANYL CITRATE 50 MCG: 50 INJECTION, SOLUTION INTRAMUSCULAR; INTRAVENOUS at 11:00

## 2018-05-01 RX ADMIN — Medication 1000 UNITS: at 10:57

## 2018-05-01 RX ADMIN — MIDAZOLAM HYDROCHLORIDE 1 MG: 1 INJECTION, SOLUTION INTRAMUSCULAR; INTRAVENOUS at 10:59

## 2018-05-01 RX ADMIN — IOPAMIDOL 70 ML: 612 INJECTION, SOLUTION INTRAVENOUS at 11:26

## 2018-05-01 RX ADMIN — HEPARIN SODIUM 4000 UNITS: 1000 INJECTION, SOLUTION INTRAVENOUS; SUBCUTANEOUS at 11:05

## 2018-05-01 RX ADMIN — Medication 1000 UNITS: at 10:55

## 2018-05-01 RX ADMIN — LIDOCAINE HYDROCHLORIDE 3 ML: 10 INJECTION, SOLUTION EPIDURAL; INFILTRATION; INTRACAUDAL; PERINEURAL at 11:04

## 2018-05-01 RX ADMIN — SODIUM CHLORIDE 50 ML/HR: 900 INJECTION, SOLUTION INTRAVENOUS at 09:44

## 2018-05-01 NOTE — PROGRESS NOTES
Tr band removed from left wrist, no active drainage noted from site, 2x2 and tegaderm applied to site, neuro/vasc assessment wnl

## 2018-05-01 NOTE — H&P
Date of Surgery Update:  Luis Antonio Graf was seen and examined. History and physical has been reviewed. The patient has been examined. There have been no significant clinical changes since the completion of the originally dated History and Physical.  Date of Surgery Update:  Luis Antonio Graf was seen and examined. History and physical has been reviewed. The patient has been examined. There have been no significant clinical changes since the completion of the originally dated History and Physical.    Patient assessed and is candidate for moderate sedation.       Signed By: Nilsa Unger MD     May 1, 2018 10:29 AM               Signed By: Nilsa Unger MD     May 1, 2018 11:36 AM             Signed By: Nilsa Unger MD     May 1, 2018 10:29 AM

## 2018-05-01 NOTE — PROGRESS NOTES
Pt returned to care unit via stretcher, pt aaox3. Tr band intact to left wrist area, site clean and dry. Neuro/vasc assessment WNL. Right hand with iv noted to be puffy on back of hand and into fingers, iv discontinues, coban to site, site elevated, iv restarted medial to former site.

## 2018-05-01 NOTE — PROGRESS NOTES
Pt up to bathroom amb, voided without incident. Discharge inst given and reviewed with pt and wife, both verbalized understanding. Right hand remains puffy, inst pt to keep elevated rest of day and apply warm compresses to site on and off tomorrow cap refill quick, pulses in rad and ulnar palpable. Pt discharge via wheelchair to car in care of wife,  Denies any pain or distress at time of discharge.   Arm bands removed and shredded

## 2018-05-01 NOTE — DISCHARGE SUMMARY
Discharge Summary    Patient: Jen Knott MRN: [id number]  CSN: [id number]    YOB: 1970  Age: 50 y.o.   Sex: male    DOA: 5/1/2018 LOS:  LOS: 0 days   Discharge Date:      Primary Care Provider:  Kalani Kilpatrick DO    Admission Diagnoses: Angina class III    Discharge Diagnoses:  Chest pain   Problem List as of 5/1/2018  Date Reviewed: 1/15/2018          Codes Class Noted - Resolved    Obesity, morbid ([facility] ICD-10-CM: E66.01  ICD-9-CM: 278.01  1/15/2018 - Present        Morbid obesity ([facility] ICD-10-CM: E66.01  ICD-9-CM: 278.01  Unknown - Present        Morbid obesity with BMI of 45.0-49.9, adult (HCC) ICD-10-CM: E66.01, Z68.42  ICD-9-CM: 278.01, V85.42  Unknown - Present        Low testosterone ICD-10-CM: R79.89  ICD-9-CM: 790.99  Unknown - Present        Bipolar 1 disorder (HCC) ICD-10-CM: F31.9  ICD-9-CM: 296.7  Unknown - Present        GERD (gastroesophageal reflux disease) ICD-10-CM: K21.9  ICD-9-CM: 530.81  Unknown - Present        Hypertension ICD-10-CM: I10  ICD-9-CM: 401.9  Unknown - Present        Tremors of nervous system ICD-10-CM: R25.1  ICD-9-CM: 781.0  Unknown - Present        Hypercholesteremia ICD-10-CM: E78.00  ICD-9-CM: 272.0  Unknown - Present        Arthritis of both knees ICD-10-CM: M17.0  ICD-9-CM: 716.96  Unknown - Present        Arthritis of spine ([facility] ICD-10-CM: M46.90  ICD-9-CM: 721.90  Unknown - Present        Arthritis of both hands ICD-10-CM: M19.041, M19.042  ICD-9-CM: 716.94  Unknown - Present        Migraines ICD-10-CM: G43.909  ICD-9-CM: 346.90  Unknown - Present        Diabetes ([facility] ICD-10-CM: E11.9  ICD-9-CM: 250.00  Unknown - Present        Sleep apnea ICD-10-CM: G47.30  ICD-9-CM: 780.57  Unknown - Present        Hx of cardiomyopathy ICD-10-CM: Z86.79  ICD-9-CM: V12.59  Unknown - Present    Overview Signed 1/15/2018  4:29 PM by Jenifer Maxwell MD     ? reason for diagnosis 10 years ago             Diastasis recti ICD-10-CM: M62.08  ICD-9-CM: 728.84  Unknown - Present        Type 2 diabetes mellitus with diabetic neuropathy ([facility] ICD-10-CM: E11.40  ICD-9-CM: 250.60, 357.2  1/15/2018 - Present        CVA (cerebral vascular accident) [facility]) ICD-10-CM: I63.9  ICD-9-CM: 434.91  Unknown - Present    Overview Signed 1/15/2018  8:03 PM by Deana Singleton MD     2016- due to vasculitis             Bipolar affective disorder, mixed, mild ([facility] ICD-10-CM: F31.61  ICD-9-CM: 296.61  7/7/2017 - Present        Alcohol dependence in remission [facility]) ICD-10-CM: F10.21  ICD-9-CM: 303.93  7/7/2017 - Present              Discharge Medications:     Current Discharge Medication List      CONTINUE these medications which have CHANGED    Details   metFORMIN (GLUCOPHAGE) 500 mg tablet Take 1 Tab by mouth two (2) times daily (with meals). Qty: 30 Tab, Refills: 0         CONTINUE these medications which have NOT CHANGED    Details   aspirin 81 mg chewable tablet Take 81 mg by mouth daily. buPROPion XL (WELLBUTRIN XL) 300 mg XL tablet Take 1 Tab by mouth every morning. Qty: 30 Tab, Refills: 1      OXcarbaxepine (TRILEPTAL) 600 mg tablet Take 2 Tabs by mouth two (2) times a day. Qty: 120 Tab, Refills: 2      QUEtiapine (SEROQUEL) 300 mg tablet Take 1 Tab by mouth nightly. Indications: BIPOLAR DISORDER IN REMISSION  Qty: 30 Tab, Refills: 1      risperiDONE (RISPERDAL) 4 mg tablet Take 1 Tab by mouth nightly. Indications: MIXED BIPOLAR I DISORDER  Qty: 30 Tab, Refills: 1      gabapentin (NEURONTIN) 600 mg tablet Take 2 Tabs by mouth two (2) times a day. And 1 po at lunch  Indications: NEUROPATHIC PAIN  Qty: 150 Tab, Refills: 1      valsartan (DIOVAN) 80 mg tablet Take  by mouth daily. Omeprazole delayed release (PRILOSEC D/R) 20 mg tablet Take 20 mg by mouth daily. atorvastatin (LIPITOR) 20 mg tablet Take  by mouth daily. diclofenac potassium (ZIPSOR) 25 mg capsule Take 25 mg by mouth four (4) times daily.       azaTHIOprine (IMURAN) 50 mg tablet Take 50 mg by mouth daily. testosterone cypionate (DEPOTESTOTERONE CYPIONATE) 200 mg/mL injection by IntraMUSCular route once. traZODone (DESYREL) 50 mg tablet Take 1 Tab by mouth nightly as needed for Sleep. Qty: 30 Tab, Refills: 5      DULoxetine (CYMBALTA) 60 mg capsule Take 1 Cap by mouth daily. Take with 30 mg dose for a total of 90 mg  Qty: 30 Cap, Refills: 8         STOP taking these medications       metoprolol tartrate (LOPRESSOR) 25 mg tablet Comments:   Reason for Stopping:               Discharge Condition: Stable    Procedures : LHC and coronary angiogram     Consults: None      PHYSICAL EXAM   Visit Vitals    /78 (BP 1 Location: Right arm)    Pulse 85    Temp 98.1 °F (36.7 °C)    Resp 18    Ht 5' 9\" (1.753 m)    Wt 146.3 kg (322 lb 8 oz)    SpO2 98%    BMI 47.62 kg/m2     General: Awake, cooperative, no acute distress    HEENT: NC, Atraumatic. PERRLA, EOMI. Anicteric sclerae. Lungs:  CTA Bilaterally. No Wheezing/Rhonchi/Rales. Heart:  Regular  rhythm,  No murmur, No Rubs, No Gallops  Abdomen: Soft, Non distended, Non tender. +Bowel sounds,   Extremities: No c/c/e  Psych:   Not anxious or agitated. Neurologic:  No acute neurological deficits. Admission HPI : See H/P    Hospital Course : Patient came for out patient LHC and coronary angiogram. LHC and coronary angiogram done via left radial approach. Left main is normal.  LAD, LCX and RCA has luminal irregularities. LAD and RCA has slow flow. Patient tolerated procedure. Medical management. Activity: No weight lifting not more than 5 lbs from left hand for 1 week. Diet: Cardiac     Follow-up: In cardiology clinic after 2 weeks    Disposition: Home    Minutes spent on discharge: 30 minutes       Labs: Results:       Chemistry No results for input(s): GLU, NA, K, CL, CO2, BUN, CREA, CA, AGAP, BUCR, TBIL, GPT, AP, TP, ALB, GLOB, AGRAT in the last 72 hours.    CBC w/Diff No results for input(s): WBC, RBC, HGB, HCT, PLT, GRANS, LYMPH, EOS, HGBEXT, HCTEXT, PLTEXT in the last 72 hours. Cardiac Enzymes No results for input(s): CPK, CKND1, AYDE in the last 72 hours. No lab exists for component: CKRMB, TROIP   Coagulation No results for input(s): PTP, INR, APTT in the last 72 hours. No lab exists for component: INREXT    Lipid Panel No results found for: CHOL, CHOLPOCT, CHOLX, CHLST, CHOLV, 560661, HDL, LDL, LDLC, DLDLP, 992122, VLDLC, VLDL, TGLX, TRIGL, TRIGP, TGLPOCT, CHHD, CHHDX   BNP No results for input(s): BNPP in the last 72 hours. Liver Enzymes No results for input(s): TP, ALB, TBIL, AP, SGOT, GPT in the last 72 hours. No lab exists for component: DBIL   Thyroid Studies No results found for: T4, T3U, TSH, TSHEXT         Significant Diagnostic Studies: No results found. No results found for this or any previous visit.         CC: Stuart Lefort Dait, DO

## 2018-05-01 NOTE — PROCEDURES
LHC and coronary angiogram done via left radial approach. Left main is normal.  LAD, LCX and RCA has luminal irregularities. LAD and RCA has slow flow. Patient tolerated procedure. Medical management.

## 2018-05-01 NOTE — PROGRESS NOTES
TRANSFER - OUT REPORT:  Verbal report given to tj fulton(name) on Merlinda Mutton being transferred to care(unit) for routine progression of care   Report consisted of patients Situation, Background, Assessment and   Recommendations(SBAR). Information from the following report(s) SBAR, Kardex, Procedure Summary, MAR and Cardiac Rhythm nsr was reviewed with the receiving nurse. Cath Lab Report:    Procedure:  [x ] LHC  [ ] RHC  [ ] PTCA   [ ] Peripheral   [ ] Pacemaker [ ] SUDHIR  [ ] [facility]    Access site:   [ ] Right [x ] Left  [x ] Radial [ ] Brachial [ ] Femoral [ ] Jugular [ ] Chest Wall      Sheath:           [ x] Pulled in Cath Lab   [ ] In place   [ ] To be pulled after:         Closure:          [ x] Radial Band  [ ] Manual Pressure     [ ] Jaret Fears     [ ] Star Close    [ ] Per Close    [ ] Safe Guard  x  Site Assessment:   [x ] Clean, Dry, No bleeding    [ ] Minor oozing          [ ] Hematoma: Description:    Stents(s) Placement:  [ ] Left Main:                 [ ] LAD:                [ ] Circ:                [ ] RCA:                [ ] EF:     [ ] Peripheral:      [ ] N/A    Infusion [ ]Angiomax [ ] Integrelin [ ] Heparin d/c'd:      Intra procedure Medications:    Fentanyl:50mg  Versed:1  Heparin:4000u  Antiplatelet:  Other:    Lines:        Peripheral IV 05/01/18 Right Hand (Active)   Site Assessment Clean, dry, & intact 5/1/2018  9:44 AM   Phlebitis Assessment 0 5/1/2018  9:44 AM   Infiltration Assessment 0 5/1/2018  9:44 AM   Dressing Status Clean, dry, & intact 5/1/2018  9:44 AM          Patient Vitals for the past 4 hrs:   Temp Pulse Resp BP SpO2   05/01/18 1134 98.1 °F (36.7 °C) 85 18 140/78 98 %   05/01/18 0934 97.9 °F (36.6 °C) 64 16 122/76 91 %         Extended / Orthostatic Vitals:    Vital Signs  Level of Consciousness: Alert (05/01/18 1134)  Temp: 98.1 °F (36.7 °C) (05/01/18 1134)  Temp Source: Oral (05/01/18 1134)  Pulse (Heart Rate): 85 (05/01/18 1134)  Heart Rate Source: Monitor (05/01/18 1134)  Cardiac Rhythm: Normal sinus rhythm (05/01/18 1134)  Resp Rate: 18 (05/01/18 1134)  BP: 140/78 (05/01/18 1134)  MAP (Calculated): 99 (05/01/18 1134)  BP 1 Location: Right arm (05/01/18 1134)  BP 1 Method: Automatic (05/01/18 1134)  BP Patient Position: At rest (05/01/18 0934)  MEWS Score: 1 (05/01/18 1134)         Oxygen Therapy  O2 Sat (%): 98 % (05/01/18 1134)  O2 Device: Room air (05/01/18 0934)          Opportunity for questions and clarification was provided.

## 2018-05-01 NOTE — DISCHARGE INSTRUCTIONS
HEART CATHETERIZATION/ANGIOGRAPHY DISCHARGE INSTRUCTIONS    1. Check puncture site frequently for swelling or bleeding. If there is any bleeding, lie down and apply pressure over the area with a clean towel or washcloth. Notify your doctor for any redness, swelling, drainage, or oozing from the puncture site. Notify your doctor for any fever or chills. 2. If the extremity becomes cold, numb, or painful call go to the emergency room  3. Activity should be limited for the next 48 hours. Climb stairs as little as possible and avoid any stooping, bending, or strenuous activity for 48 hours. No heavy lifting (anything over 10 pounds) for 3 days. 4. You may resume your usual diet. Drink more fluids than usual.  5. Have a responsible person drive you home and stay with you for at least 24 hours after your heart catheterization/angiography. 6. You may remove bandage from your Left and Arm in 24 hours. You may shower in 24 hours. No tub baths, hot tubs, or swimming for 1 week. Do not place any lotions, creams, powders, or ointments over puncture site for 1 week. You may place a clean band-aid over the puncture site each day for 5 days. Change daily. I have read the above instructions and have had the opportunity to ask questions. DISCHARGE SUMMARY from Nurse    PATIENT INSTRUCTIONS:    After general anesthesia or intravenous sedation, for 24 hours or while taking prescription Narcotics:  · Limit your activities  · Do not drive and operate hazardous machinery  · Do not make important personal or business decisions  · Do  not drink alcoholic beverages  · If you have not urinated within 8 hours after discharge, please contact your surgeon on call.     Report the following to your surgeon:  · Excessive pain, swelling, redness or odor of or around the surgical area  · Temperature over 100.5  · Nausea and vomiting lasting longer than 4 hours or if unable to take medications  · Any signs of decreased circulation or nerve impairment to extremity: change in color, persistent  numbness, tingling, coldness or increase pain  · Any questions    What to do at Home:  Recommended activity: Activity as tolerated and no driving for today,     *  Please give a list of your current medications to your Primary Care Provider. *  Please update this list whenever your medications are discontinued, doses are      changed, or new medications (including over-the-counter products) are added. *  Please carry medication information at all times in case of emergency situations. These are general instructions for a healthy lifestyle:    No smoking/ No tobacco products/ Avoid exposure to second hand smoke  Surgeon General's Warning:  Quitting smoking now greatly reduces serious risk to your health. Obesity, smoking, and sedentary lifestyle greatly increases your risk for illness    A healthy diet, regular physical exercise & weight monitoring are important for maintaining a healthy lifestyle    You may be retaining fluid if you have a history of heart failure or if you experience any of the following symptoms:  Weight gain of 3 pounds or more overnight or 5 pounds in a week, increased swelling in our hands or feet or shortness of breath while lying flat in bed. Please call your doctor as soon as you notice any of these symptoms; do not wait until your next office visit. Recognize signs and symptoms of STROKE:    F-face looks uneven    A-arms unable to move or move unevenly    S-speech slurred or non-existent    T-time-call 911 as soon as signs and symptoms begin-DO NOT go       Back to bed or wait to see if you get better-TIME IS BRAIN. Warning Signs of HEART ATTACK     Call 911 if you have these symptoms:   Chest discomfort. Most heart attacks involve discomfort in the center of the chest that lasts more than a few minutes, or that goes away and comes back.  It can feel like uncomfortable pressure, squeezing, fullness, or pain.   Discomfort in other areas of the upper body. Symptoms can include pain or discomfort in one or both arms, the back, neck, jaw, or stomach.  Shortness of breath with or without chest discomfort.  Other signs may include breaking out in a cold sweat, nausea, or lightheadedness. Don't wait more than five minutes to call 911 - MINUTES MATTER! Fast action can save your life. Calling 911 is almost always the fastest way to get lifesaving treatment. Emergency Medical Services staff can begin treatment when they arrive -- up to an hour sooner than if someone gets to the hospital by car. The discharge information has been reviewed with the patient and spouse. The patient and spouse verbalized understanding. Discharge medications reviewed with the patient and spouse and appropriate educational materials and side effects teaching were provided.     Patient armband removed and shredded    ___________________________________________________________________________________________________________________________________

## 2018-05-01 NOTE — IP AVS SNAPSHOT
[address] [address] 
[phone] Patient: Valorie Hankins MRN: [id number] :1970 About your hospitalization You were admitted on:  May 1, 2018 You last received care in the:  [address] You were discharged on:  May 1, 2018 Why you were hospitalized Your primary diagnosis was:  Not on File Follow-up Information Follow up With Details Comments Contact Info Deuce Jenkins MD   [address] [facility] [address] [facility] 
[phone] Leticia Caban DO   [address] [address] 
[phone] Deuce Jenkins MD In 2 weeks Follow up as instructed [address] [facility] [address] [facility] 
[phone] Your Scheduled Appointments Friday Naz 15, 2018  3:30 PM EDT [facility] Dr. Paul Hand Follow Up with Krishna Encarnacion MD  
[address] [place], [place] [address]e 68 [facility] 67 [address] [phone] Discharge Orders None A check ulises indicates which time of day the medication should be taken. My Medications CHANGE how you take these medications Instructions Each Dose to Equal  
 Morning Noon Evening Bedtime DULoxetine 60 mg capsule Commonly known as:  CYMBALTA What changed:  additional instructions Your last dose was: Your next dose is: Take 1 Cap by mouth daily. Take with 30 mg dose for a total of 90 mg  
 60 mg  
    
   
   
   
  
 metFORMIN 500 mg tablet Commonly known as:  GLUCOPHAGE Start taking on:  5/3/2018 What changed:  how much to take Your last dose was: Your next dose is: Take 1 Tab by mouth two (2) times daily (with meals). 500 mg CONTINUE taking these medications Instructions Each Dose to Equal  
 Morning Noon Evening Bedtime aspirin 81 mg chewable tablet Your last dose was: Your next dose is: Take 81 mg by mouth daily. 81 mg  
    
   
   
   
  
 buPROPion  mg XL tablet Commonly known as:  Alexi Newman Your last dose was: Your next dose is: Take 1 Tab by mouth every morning. 300 mg  
    
   
   
   
  
 diclofenac potassium 25 mg capsule Commonly known as:  [employer] Your last dose was: Your next dose is: Take 25 mg by mouth four (4) times daily. 25 mg  
    
   
   
   
  
 DIOVAN 80 mg tablet Generic drug:  valsartan Your last dose was: Your next dose is: Take  by mouth daily. gabapentin 600 mg tablet Commonly known as:  NEURONTIN Your last dose was: Your next dose is: Take 2 Tabs by mouth two (2) times a day. And 1 po at lunch  Indications: NEUROPATHIC PAIN  
 1200 mg  
    
   
   
   
  
 IMURAN 50 mg tablet Generic drug:  azaTHIOprine Your last dose was: Your next dose is: Take 50 mg by mouth daily. 50 mg  
    
   
   
   
  
 LIPITOR 20 mg tablet Generic drug:  atorvastatin Your last dose was: Your next dose is: Take  by mouth daily. Omeprazole delayed release 20 mg tablet Commonly known as:  PRILOSEC D/R Your last dose was: Your next dose is: Take 20 mg by mouth daily. 20 mg OXcarbaxepine 600 mg tablet Commonly known as:  TRILEPTAL Your last dose was: Your next dose is: Take 2 Tabs by mouth two (2) times a day. 1200 mg  
    
   
   
   
  
 QUEtiapine 300 mg tablet Commonly known as:  SEROquel Your last dose was: Your next dose is: Take 1 Tab by mouth nightly.  Indications: BIPOLAR DISORDER IN REMISSION  
 300 mg  
    
   
   
   
  
 risperiDONE 4 mg tablet Commonly known as:  RisperDAL Your last dose was: Your next dose is: Take 1 Tab by mouth nightly. Indications: MIXED BIPOLAR I DISORDER  
 4 mg  
    
   
   
   
  
 testosterone cypionate 200 mg/mL injection Commonly known as:  DEPOTESTOTERONE CYPIONATE Your last dose was: Your next dose is:    
   
   
 by IntraMUSCular route once. traZODone 50 mg tablet Commonly known as:   [place] Your last dose was: Your next dose is: Take 1 Tab by mouth nightly as needed for Sleep. 50 mg  
    
   
   
   
  
  
STOP taking these medications   
 metoprolol tartrate 25 mg tablet Commonly known as:  LOPRESSOR Where to Get Your Medications These medications were sent to [address] [place] [address], [address] [facility] [address]  [address] [facility] [address], [address] Phone:  [phone]  
  metFORMIN 500 mg tablet Discharge Instructions HEART CATHETERIZATION/ANGIOGRAPHY DISCHARGE INSTRUCTIONS 1. Check puncture site frequently for swelling or bleeding. If there is any bleeding, lie down and apply pressure over the area with a clean towel or washcloth. Notify your doctor for any redness, swelling, drainage, or oozing from the puncture site. Notify your doctor for any fever or chills. 2. If the extremity becomes cold, numb, or painful call go to the emergency room 3. Activity should be limited for the next 48 hours. Climb stairs as little as possible and avoid any stooping, bending, or strenuous activity for 48 hours. No heavy lifting (anything over 10 pounds) for 3 days. 4. You may resume your usual diet. Drink more fluids than usual. 
5. Have a responsible person drive you home and stay with you for at least 24 hours after your heart catheterization/angiography. 6. You may remove bandage from your Left and Arm in 24 hours.  You may shower in 24 hours. No tub baths, hot tubs, or swimming for 1 week. Do not place any lotions, creams, powders, or ointments over puncture site for 1 week. You may place a clean band-aid over the puncture site each day for 5 days. Change daily. I have read the above instructions and have had the opportunity to ask questions. DISCHARGE SUMMARY from Nurse PATIENT INSTRUCTIONS: 
 
 
F-face looks uneven A-arms unable to move or move unevenly S-speech slurred or non-existent T-time-call 911 as soon as signs and symptoms begin-DO NOT go Back to bed or wait to see if you get better-TIME IS BRAIN. Warning Signs of HEART ATTACK Call 911 if you have these symptoms: 
? Chest discomfort. Most heart attacks involve discomfort in the center of the chest that lasts more than a few minutes, or that goes away and comes back. It can feel like uncomfortable pressure, squeezing, fullness, or pain. ? Discomfort in other areas of the upper body. Symptoms can include pain or discomfort in one or both arms, the back, neck, jaw, or stomach. ? Shortness of breath with or without chest discomfort. ? Other signs may include breaking out in a cold sweat, nausea, or lightheadedness. Don't wait more than five minutes to call [address]! Fast action can save your life. Calling 911 is almost always the fastest way to get lifesaving treatment. Emergency Medical Services staff can begin treatment when they arrive  up to an hour sooner than if someone gets to the hospital by car. The discharge information has been reviewed with the patient and spouse. The patient and spouse verbalized understanding. Discharge medications reviewed with the patient and spouse and appropriate educational materials and side effects teaching were provided. Patient armband removed and shredded 
 
___________________________________________________________________________________________________________________________________ MyChart Announcement We are excited to announce that we are making your provider's discharge notes available to you in Radcom. You will see these notes when they are completed and signed by the physician that discharged you from your recent hospital stay. If you have any questions or concerns about any information you see in Radcom, please call the Health Information Department where you were seen or reach out to your Primary Care Provider for more information about your plan of care. Introducing [facility]! Edilson Fan introduces Radcom patient portal. Now you can access parts of your medical record, email your doctor's office, and request medication refills online. 1. In your internet browser, go to [email or web address] 2. Click on the First Time User? Click Here link in the Sign In box. You will see the New Member Sign Up page. 3. Enter your Radcom Access Code exactly as it appears below. You will not need to use this code after youve completed the sign-up process. If you do not sign up before the expiration date, you must request a new code. · Radcom Access Code: [email or web address] Expires: 7/29/2018  5:11 PM 
 
4. Enter the last four digits of your Social Security Number (xxxx) and Date of Birth (mm/dd/yyyy) as indicated and click Submit. You will be taken to the next sign-up page. 5. Create a Mediasmartt ID. This will be your Radcom login ID and cannot be changed, so think of one that is secure and easy to remember. 6. Create a Radcom password. You can change your password at any time. 7. Enter your Password Reset Question and Answer. This can be used at a later time if you forget your password. 8. Enter your e-mail address.  You will receive e-mail notification when new information is available in [employer]. 9. Click Sign Up. You can now view and download portions of your medical record. 10. Click the Download Summary menu link to download a portable copy of your medical information. If you have questions, please visit the Frequently Asked Questions section of the [employer] website. Remember, Mobango is NOT to be used for urgent needs. For medical emergencies, dial 911. Now available from your iPhone and Android! Introducing Swapnil Burnett As a [employer] [facility] patient, I wanted to make you aware of our electronic visit tool called Swapnil Burnett. Rest[employer] 24/7 allows you to connect within minutes with a medical provider 24 hours a day, seven days a week via a mobile device or tablet or logging into a secure website from your computer. You can access Swapnil Burnett from anywhere in the [place]. A virtual visit might be right for you when you have a simple condition and feel like you just dont want to get out of bed, or cant get away from work for an appointment, when your regular [facility] provider is not available (evenings, weekends or holidays), or when youre out of town and need minor care. Electronic visits cost only $49 and if the [employer] 24/7 provider determines a prescription is needed to treat your condition, one can be electronically transmitted to a nearby pharmacy*. Please take a moment to enroll today if you have not already done so. The enrollment process is free and takes just a few minutes. To enroll, please download the Restorius 24/7 yeimi to your tablet or phone, or visit [email or web address] to enroll on your computer. And, as an [address] [place] [address] patient with a [employer] account, the results of your visits will be scanned into your electronic medical record and your primary care provider will be able to view the scanned results. We urge you to continue to see your regular Mathew Lessen provider for your ongoing medical care. And while your primary care provider may not be the one available when you seek a Swapnil Narindernavjotfin virtual visit, the peace of mind you get from getting a real diagnosis real time can be priceless. For more information on X[employer] Group[employer]ot[employer], view our Frequently Asked Questions (FAQs) at [email or web address]. Sincerely, 
 
Elisabeth Schwartz MD 
Chief Medical Officer [employer] *:  certain medications cannot be prescribed via Swapnil Burnett Providers Seen During Your Hospitalization Provider Specialty Primary office phone Gabriela Louise MD Cardiology [phone] Your Primary Care Physician (PCP) Primary Care Physician Office Phone Office Fax Huynh Loida [phone] You are allergic to the following Allergen Reactions Sudafed (Pseudoephedrine Hcl) Other (comments) Pt advised not to take due to stroke in aug '16 Recent Documentation Height Weight BMI Smoking Status 1.753 m 146.3 kg 47.62 kg/m2 Never Smoker Emergency Contacts Name Discharge Info Relation Home Work Mobile Mally Gonzalez DISCHARGE CAREGIVER [3] Spouse [3] [phone] Patient Belongings The following personal items are in your possession at time of discharge: 
     Visual Aid: None Please provide this summary of care documentation to your next provider. Signatures-by signing, you are acknowledging that this After Visit Summary has been reviewed with you and you have received a copy. Patient Signature:  ____________________________________________________________ Date:  ____________________________________________________________  
  
Minerva Young Provider Signature:  ____________________________________________________________ Date:  ____________________________________________________________

## 2018-05-01 NOTE — ROUTINE PROCESS
Cardiac Cath Lab:  Pre Procedure Chart Check     Patients chart was accessed and reviewed for possible and/or scheduled procedure. Creatinine Clearance:  Creatinine clearance cannot be calculated (No order found.)    Total Contrast  Load:  3 x estimated clearance [id number]    75% of Contrast Load:  0.75 x Total Contrast Load=    382ml    No results for input(s): WBC, RBC, HCT, HGB, PLT, INR, APTT, PTP, NA, K, BUN, CREA, GFRAA, GFRNA, CA, MAG, CPK, CKMB, CKNDX, CKND1, TROPT, TROIQ, BNPP, BNP, HCTEXT, HGBEXT, PLTEXT in the last 72 hours. No lab exists for component: DDIMER, GLUCOSE, INREXT    BMI: Body mass index is 47.62 kg/(m^2). ALLERGIES:   Allergies   Allergen Reactions    Sudafed [Pseudoephedrine Hcl] Other (comments)     Pt advised not to take due to stroke in aug '16       Lines:        Peripheral IV 05/01/18 Right Hand (Active)   Site Assessment Clean, dry, & intact 5/1/2018  9:44 AM   Phlebitis Assessment 0 5/1/2018  9:44 AM   Infiltration Assessment 0 5/1/2018  9:44 AM   Dressing Status Clean, dry, & intact 5/1/2018  9:44 AM          History:    Past Medical History:   Diagnosis Date    Arthritis of both hands     Arthritis of both knees     Arthritis of spine ([address]     Bipolar 1 disorder ([address]     CVA (cerebral vascular accident) ([address]     2016- due to vasculitis    Diabetes ([address]     Diastasis recti     GERD (gastroesophageal reflux disease)     Hx of cardiomyopathy     ?  reason for diagnosis 10 years ago    Hypercholesteremia     Hypertension     Low testosterone     Migraines     Morbid obesity ([address]     Morbid obesity with BMI of 45.0-49.9, adult ([address]     Sleep apnea     Tremors of nervous system      Past Surgical History:   Procedure Laterality Date    HX APPENDECTOMY      HX HEENT      TONSILS    HX ORTHOPAEDIC      CTR both hands    HX ORTHOPAEDIC      cervical fusion    HX ORTHOPAEDIC      TORN MENISCUS     Patient Active Problem List   Diagnosis Code    Bipolar affective disorder, mixed, mild ([facility]) F31.61    Alcohol dependence in remission ([facility] F10.21    Obesity, morbid [facility] E66.01    Morbid obesity ([facility]) E66.01    Morbid obesity with BMI of 45.0-49.9, adult ([facility]) E66.01, Z68.42    Low testosterone R79.89    Bipolar 1 disorder ([facility]) F31.9    GERD (gastroesophageal reflux disease) K21.9    Hypertension I10    Tremors of nervous system R25.1    Hypercholesteremia E78.00    Arthritis of both knees M17.0    Arthritis of spine ([facility]) M46.90    Arthritis of both hands M19.041, M19.042    Migraines G43.909    Diabetes ([facility]) E11.9    Sleep apnea G47.30    Hx of cardiomyopathy Z86.79    Diastasis recti M62.08    Type 2 diabetes mellitus with diabetic neuropathy ([facility]) E11.40    CVA (cerebral vascular accident) ([facility] I63.9

## 2018-07-10 ENCOUNTER — CLINICAL SUPPORT (OUTPATIENT)
Dept: SURGERY | Age: 48
End: 2018-07-10

## 2018-07-10 VITALS — HEIGHT: 69 IN | WEIGHT: 315 LBS | BODY MASS INDEX: 46.65 KG/M2

## 2018-07-10 DIAGNOSIS — E66.01 MORBID OBESITY WITH BMI OF 45.0-49.9, ADULT (HCC): Primary | ICD-10-CM

## 2018-07-10 NOTE — PROGRESS NOTES
Medical Weight Loss Multi-Disciplinary Program    Name: Cristy Gama   : 1970    Session# 2  Date: 7/10/2018     Height: 5' 9\" (175.3 cm)    Weight: 147.4 kg (325 lb) lbs. Body mass index is 47.99 kg/(m^2). Pt to lose weight prior to surgery from a starting weight of 320 lbs. He reports he started at a weight of 331 lb according to his scale and lost down to 320.      He is having monthly nutrition phone consults for his [facility]. To see RD in our office every 3 months minimum until surgery. Dietary Instructions    Reviewed intake  Understanding low carbohydrates, low sugar, higher protein meals  Understanding proper portions  Instruction given for personal dietary changes  Discussed perceived compliance  Comments: Diet hx reviewed and personal dietary changes discussed. Diet hx: B- cereal- chocolate cheerios with 2% milk, L-burrito with chicken, carrots, cucumbers- 1 of these, S- white cheddar cheetos, apple, 2-3 bottles of water by now, D- Fried zucchini, marinated chicken, cucumber with another vegetable, S- small pack of animal crackers     He sometimes uses Special K protein shake   Physical Activity/Exercise    Discussed Perceived Compliance  Reasonable Goals Set  Motivation  Comments: Pt is not exercising as recommended. Work to start exercising- chair exercises- on Weekends for sure. Behavior Modification    Achieving/Rewarding goals met  Positive attitude  Discussed perceived compliance  Comments: Pt plans to start exercising. He has started following a Weight Watcher's program and self-reports wt loss according to his Weight Watcher scale at home. However, this is not evidenced here in the office. He is focusing on the following for weight loss:     Goals:  1. Use a protein shake from the Top 10 list- use the Premier shake for breakfast or the new high performance shake- Equate- 30 grams of protein, less than 7 grams of carbohydrate.    2. Replace carbohydrate snacks with protein snacks  3. Review packet for recommended foods and protein snack options. 4. Work to decrease totals for [employer] by about 25% per day. 5. Work to start exercising- chair exercises- on Weekends for sure.      Candidate for surgery (per RD): YES    Dietitian: Haven Cuenca RD

## 2018-07-10 NOTE — MR AVS SNAPSHOT
[facility] 
 
 
 [address] [facility] 
[phone] Patient: Saurabh Guzmán MRN: [id number] :1970 Visit Information Date & Time Provider Department Dept. Phone Encounter #  
 7/10/2018  2:30 PM T[facility] [phone] [id number] Your Appointments 7/10/2018  2:30 PM  
NUTRITION COUNSELING with TSS NUTRI VISIT PEN [facility] Appt Note: f/u shakeel care, on track for pre-op and wt loss [address] [place] [address] [address]  
  
   
 [address] [place] [address] Upcoming Health Maintenance Date Due HEMOGLOBIN A1C Q6M 1970 LIPID PANEL Q1 1970 FOOT EXAM Q1 10/13/1978 MICROALBUMIN Q1 10/13/1978 EYE EXAM RETINAL OR DILATED Q1 10/13/1978 Pneumococcal 19-64 Medium Risk (1 of 1 - PPSV23) 10/13/1987 DTaP/Tdap/Td series (1 - Tdap) 10/13/1989 Influenza Age 5 to Adult 2018 Allergies as of 7/10/2018  Review Complete On: 2018 By: Jorge Perez Severity Noted Reaction Type Reactions Sudafed [Pseudoephedrine Hcl] Low 2018    Other (comments) Pt advised not to take due to stroke in aug '16 Current Immunizations  Never Reviewed No immunizations on file. Not reviewed this visit Vitals Height(growth percentile) Weight(growth percentile) BMI Smoking Status 5' 9\" (1.753 m) 325 lb (147.4 kg) 47.99 kg/m2 Never Smoker BMI and BSA Data Body Mass Index Body Surface Area  
 47.99 kg/m 2 2.68 m 2 Preferred Pharmacy Pharmacy Name Phone [facility], [address] [phone] Your Updated Medication List  
  
   
This list is accurate as of 7/10/18  2:26 PM.  Always use your most recent med list.  
  
  
  
  
 aspirin 81 mg chewable tablet Take 81 mg by mouth daily. buPROPion  mg XL tablet Commonly known as:  Kayla Sizer Take 1 Tab by mouth every morning. diclofenac potassium 25 mg capsule Commonly known as:  [employer] Take 25 mg by mouth four (4) times daily. DIOVAN 80 mg tablet Generic drug:  valsartan Take  by mouth daily. DULoxetine 60 mg capsule Commonly known as:  CYMBALTA Take 1 Cap by mouth daily. gabapentin 600 mg tablet Commonly known as:  NEURONTIN Take 2 Tabs by mouth two (2) times a day. And 1 po at lunch  Indications: NEUROPATHIC PAIN  
  
 IMURAN 50 mg tablet Generic drug:  azaTHIOprine Take 50 mg by mouth daily. LIPITOR 20 mg tablet Generic drug:  atorvastatin Take  by mouth daily. metFORMIN 500 mg tablet Commonly known as:  GLUCOPHAGE Take 1 Tab by mouth two (2) times daily (with meals). Omeprazole delayed release 20 mg tablet Commonly known as:  PRILOSEC D/R Take 20 mg by mouth daily. OXcarbaxepine 600 mg tablet Commonly known as:  TRILEPTAL Take 2 Tabs by mouth two (2) times a day. QUEtiapine 300 mg tablet Commonly known as:  SEROquel Take 1 Tab by mouth nightly. Indications: BIPOLAR DISORDER IN REMISSION  
  
 risperiDONE 4 mg tablet Commonly known as:  RisperDAL Take 1 Tab by mouth nightly. Indications: MIXED BIPOLAR I DISORDER  
  
 testosterone cypionate 200 mg/mL injection Commonly known as:  DEPOTESTOTERONE CYPIONATE  
by IntraMUSCular route once. traZODone 50 mg tablet Commonly known as:  [employer] Take 1 Tab by mouth nightly as needed for Sleep. To-Do List   
 09/14/2018 3:30 PM  
  Appointment with oTrres Gold MD at [address] [place] [address] ([phone]) Patient Instructions Goals: 1.  Use a protein shake from the Top 10 list- use the Premier shake for breakfast or the new high performance shake- Equate- 30 grams of protein, less than 7 grams of carbohydrate. 2. Replace carbohydrate snacks with protein snacks 3. Review packet for recommended foods and protein snack options. 4. Work to decrease totals for [employer] by about 25% per day. 5. Work to start exercising- chair exercises- on Weekends for sure. Introducing [facility]! Romayne Duster introduces [employer] patient portal. Now you can access parts of your medical record, email your doctor's office, and request medication refills online. 1. In your internet browser, go to [email or web address] 2. Click on the First Time User? Click Here link in the Sign In box. You will see the New Member Sign Up page. 3. Enter your [employer]ax Access Code exactly as it appears below. You will not need to use this code after youve completed the sign-up process. If you do not sign up before the expiration date, you must request a new code. · Operax Access Code: [email or web address] Expires: 7/29/2018  5:11 PM 
 
4. Enter the last four digits of your Social Security Number (xxxx) and Date of Birth (mm/dd/yyyy) as indicated and click Submit. You will be taken to the next sign-up page. 5. Create a Operax ID. This will be your Operax login ID and cannot be changed, so think of one that is secure and easy to remember. 6. Create a [employer]ax password. You can change your password at any time. 7. Enter your Password Reset Question and Answer. This can be used at a later time if you forget your password. 8. Enter your e-mail address. You will receive e-mail notification when new information is available in [address] 9. Click Sign Up. You can now view and download portions of your medical record. 10. Click the Download Summary menu link to download a portable copy of your medical information. If you have questions, please visit the Frequently Asked Questions section of the [employer] website.  Remember, [employer]ax is NOT to be used for urgent needs. For medical emergencies, dial 911. Now available from your iPhone and Android! Please provide this summary of care documentation to your next provider. Your primary care clinician is listed as Kim France. If you have any questions after today's visit, please call [phone].

## 2018-09-11 ENCOUNTER — CLINICAL SUPPORT (OUTPATIENT)
Dept: SURGERY | Age: 48
End: 2018-09-11

## 2018-09-11 VITALS — HEIGHT: 69 IN | WEIGHT: 315 LBS | BODY MASS INDEX: 46.65 KG/M2

## 2018-09-11 DIAGNOSIS — E66.01 MORBID OBESITY WITH BMI OF 45.0-49.9, ADULT (HCC): Primary | ICD-10-CM

## 2018-09-11 NOTE — PATIENT INSTRUCTIONS
Goals: 1. Time walk this month- continue 4-5 days per week. Find your time of walking base and increase as you can in 10 minutes increments as tolerated. 2. Make sure to use approved yogurts. 3. Can use a protein shake in the evening as your PM snack instead of carbohydrate food. 4. Try to eliminate sugar to help with fullness. Replace with protein items- check shopping list.   5. Add vegetables at lunch and dinner everyday- microwave steamer bags.

## 2018-09-11 NOTE — PROGRESS NOTES
Medical Weight Loss Multi-Disciplinary Program    Name: Terell Kolb   : 1970    Session# 3  Date: 2018     Height: 5' 9\" (175.3 cm)    Weight: 146.1 kg (322 lb) lbs. Body mass index is 47.55 kg/(m^2). Pounds Lost: 3 since last nutrition. Pt to lose weight from a starting weight of 320 lbs. Dietary Instructions    Reviewed intake  Understanding low carbohydrates, low sugar, higher protein meals  Understanding proper portions  Instruction given for personal dietary changes  Discussed perceived compliance  Comments: Diet hx reviewed and personal dietary changes discussed. Diet hx: B- Premier, S- Yogurt- not approved, L- Rolls with meat and cheese and miracle whip (turkey and [place] cheese), mozzarella cheese stick, S- Slurpee from - regular Coke, D- 2 hearty hamburgers with buns and ketchup, S- Animal Crackers- package of these or a mini cone. Physical Activity/Exercise    Discussed Perceived Compliance  Reasonable Goals Set  Motivation  Comments: Walks 1/4 mile per day, 4-5 days per week. Pt is challenged with mindfulness in exercise. Discussed being deliberate with exercise and its importance for weight loss and maintenance. Goal to time walk this month- continue 4-5 days per week. Find your time of walking base and increase as you can in 10 minutes increments as tolerated. Behavior Modification    Achieving/Rewarding goals met  Positive attitude  Discussed perceived compliance  Comments: Pt is exercising and plans to increase. Switched to [employer] protein shake as directed. Has not looked at shopping list or made a switch to low carbohydrate, high protein snacks. Discussed in detail the role of sugar, protein, and fiber from vegetables in hunger and satiety. Also discussed the variable effect the sleeve procedure has on hunger and food cravings after surgery. Goals:  1. Time walk this month- continue 4-5 days per week.  Find your time of walking base and increase as you can in 10 minutes increments as tolerated. 2. Make sure to use approved yogurts. 3. Can use a protein shake in the evening as your PM snack instead of carbohydrate food. 4. Try to eliminate sugar to help with fullness. Replace with protein items- check shopping list.   5. Add vegetables at lunch and dinner everyday- microwave steamer bags.      Candidate for surgery (per RD): YES, pt continues [facility] phone consults and will complete these in Jan., 2019    Dietitian: Calos Chambers RD

## 2018-11-13 ENCOUNTER — CLINICAL SUPPORT (OUTPATIENT)
Dept: SURGERY | Age: 48
End: 2018-11-13

## 2018-11-13 VITALS — HEIGHT: 69 IN | BODY MASS INDEX: 46.65 KG/M2 | WEIGHT: 315 LBS

## 2018-11-13 DIAGNOSIS — E66.01 MORBID OBESITY WITH BMI OF 45.0-49.9, ADULT (HCC): Primary | ICD-10-CM

## 2018-11-13 NOTE — PATIENT INSTRUCTIONS
Goals: 1. Try to decrease portions of usual food by at least 20% this month. 2. Count out nuts- about 10 per day. 3. Continue exercise routine of walking for 15 minutes, 3 times per week. 4. Work on making sure to have at least 4 water bottles (64 oz) of water everyday.    5. Instead of Coke, try unsweet tea from [employer] with Equal.

## 2019-01-07 ENCOUNTER — OFFICE VISIT (OUTPATIENT)
Dept: SURGERY | Age: 49
End: 2019-01-07

## 2019-01-07 VITALS
RESPIRATION RATE: 16 BRPM | DIASTOLIC BLOOD PRESSURE: 76 MMHG | OXYGEN SATURATION: 95 % | WEIGHT: 315 LBS | BODY MASS INDEX: 46.65 KG/M2 | HEART RATE: 85 BPM | HEIGHT: 69 IN | TEMPERATURE: 98.2 F | SYSTOLIC BLOOD PRESSURE: 112 MMHG

## 2019-01-07 DIAGNOSIS — G47.30 SLEEP APNEA, UNSPECIFIED TYPE: ICD-10-CM

## 2019-01-07 DIAGNOSIS — E66.01 MORBID OBESITY (HCC): Primary | ICD-10-CM

## 2019-01-07 DIAGNOSIS — E78.00 HYPERCHOLESTEREMIA: ICD-10-CM

## 2019-01-07 DIAGNOSIS — I10 ESSENTIAL HYPERTENSION: ICD-10-CM

## 2019-01-07 DIAGNOSIS — E66.01 MORBID OBESITY WITH BMI OF 45.0-49.9, ADULT (HCC): ICD-10-CM

## 2019-01-07 DIAGNOSIS — E11.9 TYPE 2 DIABETES MELLITUS WITHOUT COMPLICATION, WITHOUT LONG-TERM CURRENT USE OF INSULIN (HCC): ICD-10-CM

## 2019-01-07 DIAGNOSIS — M17.0 ARTHRITIS OF BOTH KNEES: ICD-10-CM

## 2019-01-07 DIAGNOSIS — Z86.79 HX OF CARDIOMYOPATHY: ICD-10-CM

## 2019-01-07 DIAGNOSIS — K21.9 GASTROESOPHAGEAL REFLUX DISEASE WITHOUT ESOPHAGITIS: ICD-10-CM

## 2019-01-07 RX ORDER — LORAZEPAM 2 MG/1
TABLET ORAL
COMMUNITY
End: 2019-02-06

## 2019-01-07 NOTE — PROGRESS NOTES
Bariatric Surgery Consultation    Subjective: Magalis Davis is a 46 y.o. obese male with a Body mass index is 47.27 kg/m². .  Magalis Davis desires surgery at this time because of health issues and quality of life issues. Magalis Davis has been seen by a bariatric nutritionist and has been placed on an appropriate low carbohydrate diet. The patient desires laparoscopic sleeve gastrectomy for surgical weight loss, however he is not currently a surgical candidate due to pending work up. Magalis Davis is here today to check progress with weight loss / evaluate nutritional status and review all subspecialty clearances in hopes of proceeding to the operating room. Office visit notes from Jan 2018 to present have been reviewed. Magalis Davis has increased fluid intake, is focusing on protein, is eating regularly, is taking a multivitamin & has increased his activity. No recent visits with PCP. No new medications. Mr Roya Durbin has just completed his [employer] requirement. He has not lost any weight since his initial consultation. Since the last time we saw him he had a cardiac catherization 5/1/2018 as part of his cardiac clearance by Dr. Pili Winn. Medical management was recommended. EF 50%. Records scanned under media tab. Did have CVA end of October and is following up with rheumatologist Dr. Eulalia Santo (? Vasculitis), was on oral steroids and completed December 20th.     Patient Active Problem List    Diagnosis Date Noted    Angina at rest [facility])     Obesity, morbid ([address] 01/15/2018    Type 2 diabetes mellitus with diabetic neuropathy ([address] 01/15/2018    Morbid obesity ([address]     Morbid obesity with BMI of 45.0-49.9, adult (HCC)     Low testosterone     Bipolar 1 disorder (HCC)     GERD (gastroesophageal reflux disease)     Hypertension     Tremors of nervous system     Hypercholesteremia     Arthritis of both knees     Arthritis of spine     Arthritis of both hands     Migraines     Diabetes ([address]  Sleep apnea     Hx of cardiomyopathy     Diastasis recti     [facility]     Bipolar affective disorder, mixed, mild ([facility] 07/07/2017    Alcohol dependence in remission ([facility] 07/07/2017      Past Surgical History:   Procedure Laterality Date    HX APPENDECTOMY      HX HEENT      TONSILS    HX ORTHOPAEDIC      CTR both hands    HX ORTHOPAEDIC      cervical fusion    HX ORTHOPAEDIC      TORN MENISCUS      Social History     Tobacco Use    Smoking status: Never Smoker    Smokeless tobacco: Never Used   Substance Use Topics    Alcohol use: No     Comment: recovering alcoholic      Family History   Problem Relation Age of Onset    Heart Disease Mother     Hypertension Mother     Elevated Lipids Mother     Heart Disease Father     Hypertension Father     Elevated Lipids Father       Current Outpatient Medications   Medication Sig Dispense Refill    LORazepam (ATIVAN) 2 mg tablet Take  by mouth every six (6) hours as needed for Anxiety.  erenumab-aooe (AIMOVIG AUTOINJECTOR) 70 mg/mL atIn by SubCUTAneous route.  buPROPion XL (WELLBUTRIN XL) 300 mg XL tablet Take 1 Tab by mouth every morning. 30 Tab 5    DULoxetine (CYMBALTA) 60 mg capsule Take 1 Cap by mouth daily. 30 Cap 5    gabapentin (NEURONTIN) 600 mg tablet Take 2 Tabs by mouth two (2) times a day. And 1 po at lunch 150 Tab 5    QUEtiapine (SEROQUEL) 300 mg tablet Take 1 Tab by mouth nightly. 30 Tab 5    traZODone (DESYREL) 50 mg tablet Take 1 Tab by mouth nightly as needed for Sleep. 30 Tab 5    OXcarbaxepine (TRILEPTAL) 600 mg tablet Take 2 Tabs by mouth two (2) times a day. 120 Tab 5    risperiDONE (RISPERDAL) 4 mg tablet Take 1 Tab by mouth nightly. Indications: MIXED BIPOLAR I DISORDER 30 Tab 5    metFORMIN (GLUCOPHAGE) 500 mg tablet Take 1 Tab by mouth two (2) times daily (with meals). 30 Tab 0    aspirin 81 mg chewable tablet Take 81 mg by mouth daily.       valsartan (DIOVAN) 80 mg tablet Take  by mouth daily.  Omeprazole delayed release (PRILOSEC D/R) 20 mg tablet Take 20 mg by mouth daily.  atorvastatin (LIPITOR) 20 mg tablet Take  by mouth daily.  diclofenac potassium (ZIPSOR) 25 mg capsule Take 25 mg by mouth four (4) times daily.  azaTHIOprine (IMURAN) 50 mg tablet Take 100 mg by mouth daily.  testosterone cypionate (DEPOTESTOTERONE CYPIONATE) 200 mg/mL injection by IntraMUSCular route once.        Allergies   Allergen Reactions    Sudafed [Pseudoephedrine Hcl] Other (comments)     Pt advised not to take due to stroke in aug '16          Review of Systems:        General - No history or complaints of unexpected fever, chills, or weight loss  Head/Neck - No history or complaints of headache, diplopia, dysphagia, hearing loss  Cardiac - No history or complaints of chest pain, palpitations, murmur, or shortness of breath  Pulmonary - No history or complaints of shortness of breath, productive cough, hemoptysis  Gastrointestinal - No history or complaints of reflux,  abdominal pain, obstipation/constipation, blood per rectum  Genitourinary - No history or complaints of hematuria/dysuria, stress urinary incontinence symptoms, or renal lithiasis  Musculoskeletal - No history or complaints of joint pain or muscular weakness  Hematologic - No history or complaints of bleeding disorders, blood transfusions, sickle cell anemia  Neurologic - No history or complaints of  migraine headaches, seizure activity, syncopal episodes, TIA or stroke  Integumentary - No history or complaints of rashes, abnormal nevi, skin cancer  Gynecological - No history of heavy menses/abnormal menses    Objective:     Visit Vitals  /76 (BP 1 Location: Right arm, BP Patient Position: Sitting)   Pulse 85   Temp 98.2 °F (36.8 °C) (Temporal)   Resp 16   Ht 5' 9\" (1.753 m)   Wt 145.2 kg (320 lb 1.6 oz)   SpO2 95%   BMI 47.27 kg/m²       Physical Exam:    Physical Examination: General appearance - chronically ill appearing  Mental status - alert, oriented to person, place, and time, normal mood, behavior, speech, dress, motor activity, and thought processes  Eyes - pupils equal and reactive, extraocular eye movements intact, sclera anicteric, left eye normal, right eye normal  Neck - supple, no significant adenopathy  Chest - clear to auscultation, no wheezes, rales or rhonchi, symmetric air entry  Heart - normal rate, regular rhythm, normal S1, S2, no murmurs, rubs, clicks or gallops  Abdomen - soft, nontender, nondistended, no masses or organomegaly  Massive central obesity with severe diastasis recti present  Back exam - full range of motion, no tenderness, palpable spasm or pain on motion  Neurological - alert, oriented, normal speech, no focal findings or movement disorder noted  Musculoskeletal - no joint tenderness, deformity or swelling  Extremities - peripheral pulses normal, no pedal edema, no clubbing or cyanosis    Labs:     No results found for this or any previous visit (from the past 2016 hour(s)). Recent labs reviewed from [facility] under [address]. Assessment:     Morbid obesity with associated comorbidity of hypertension, diabetes, sleep apnea, severe central obesity & outstanding insurance requirements. Plan: To continue current medications & routine follow-up with PCP. Continuation of Pre-Operative evaluation / clearance:  Valorie Hankins has returned to the office today to discuss his status as a surgical candidate. Progress has been noted and reviewed - including review of notes from dietician. Valorie Hankins is being compliant with follow-up & recommendations. Valorie Hankins has 0 more pounds to lose before proceeding to the OR. (5 pounds lost since last visit)  Valorie Hankins has an no outstanding clearance to review before proceeding to the OR.       Valorie Hankins understand the rationales for all the above and plans to follow the diet & activity recommendations of the dietician. It has been discussed that given his morbidly obese condition that the best surgical option for this patient would be the laparoscopic sleeve gastrectomy. Mariposa Donahue agrees with the surgical choice and has been educated in it's; risks, benefits, and alternatives. We will continue with the pre-operative evaluation as needed to check progress. Secondary Diagnoses:     Dietary Intervention  - The patient is currently followed by a bariatric nutritionist for an attempt at preoperative weight loss as has been dictated by their insurance carrier. They will be assessed at various times during their follow up to evaluate their progress depending on the length of time that is required once again by their carrier. I have explained the importance of preoperative weight loss and the benefits regarding lower surgical risk and also assisting the patient in reaching their weight loss goal.  Finally they understand there is a physiologic benefit from the standpoint of hepatic volume reduction preoperatively. I have reiterated the importance of a low carbohydrate and high protein regimen to achieve their stated goal.The patients weight loss goal pre operatively is 20-25 pounds. Adult Onset Diabetes - The patient has vinicius given a very low carbohydrate diet preoperatively along with instructions to monitor their blood sugars on a regular daily basis.  When  their surgery is performed  we will be monitoring the patient with sliding scale insulin and accuchecks.  Based on those values we will determine whether the patient needs a reduction of those medications postoperatively or total removal of those medications on discharge.  We will have the patient continue accuchecks postoperatively while at home also and report to me or their family physician for appropriate adjustments as needed.  The patient also understands that in the event of uncontrolled blood sugar preoperatively that we may choose to postpone their surgery.     Hypertension - The patient has a clear understanding of how weight loss improves hypertension as a whole, but also they understand that there is a significant genetic component to this disease process. We will monitor the patients blood pressure while in the hospital and the plan would be to continue those medications postoperatively.  If a diuretic is being used we will stop them on discharge to prevent dehydration particularly with the sleeve gastrectomy and the gastric bypass procedures.  They will be instructed to monitor their blood pressure postoperatively while at home and notify their primary care physician in the event of any significantly high or uncharacteristic readings.     Hyperlipidemia - The patient understands that studies show that almost all patient will realize an improvement in their lipid profile with weight loss that occurs with these procedures. They however also understand that hyperlipidemia is a multifactorial disease particularly as it pertains to their genetic background and that there is no guarantee toward cure  of this issue. We will resume their medications immediately postoperatively as this tends to decrease any post operative cardiac events.  The patient will follow up with their family physician in the postoperative period with plans to repeat their lipid panel 2-3 month postoperative for potential adjustment or removal of these medications.     Coronary Artery Disease - The patient has undergone or will undergo a preoperative evaluation by a cardiologist such that they are deemed a reasonable candidate for surgery. The patient understands that with a history of cardiac disease that there is always an increased risk compared to the average patient.   Appropriate recommendations have been followed as recommended by the cardiologist.  The patients ASA will be resumed approximately 1 month postoperatively in a coated form.     GERD -The patient understands that weight loss surgery is not a guaranteed cure for reflux disease but does understand the benefits that weight loss can have on reflux disease.  They also understand that at the time of surgery the gastroesophageal junction will be evaluated for the presence of a diaphragmatic hernia.  Hernias will be corrected always with the gastric band and sleeve gastrectomy procedures, but only on a case by case basis with the gastric bypass if it prevents our ability to perform the operation at hand, or if I feel that they would benefit long term with correction of this issue.  The patient also understands that neither weight loss surgery nor repair of a diaphragmatic hernia repair guarantees the complete cessation of the disease. They also understand there is a possibility of recurrence with a simple crural repair as is performed with these procedures. They understand they may have to continue their medications in the postoperative period.  They have a good understanding that the gastric bypass procedure is better suited to total resolution of this issue and that neither the Lap Band nor sleeve gastrectomy is considered a curative procedure as it pertains to this diagnosis.     Weight Related Arthritis -The patient understands the benefits that weight loss surgery can have on their arthritis but also understands that weight loss is not a guaranteed cure and relief of symptoms is often dependent on the severity of the underlying disease.  The patient also understands that traditional pharmaceutical treatments for this diagnosis are usually unavailable to post-operative weight loss patients due to the effects on the gastrointestinal tract particularly with the gastric bypass and to a lesser effect with the sleeve gastrectomy.  Any changes to the patients medication treatment will ultimately be made the patients PCP with input by our office.     Obstructive Sleep Apnea -The patient understands the association of sleep apnea and obesity and the additional risk that it caries related to post surgical complications. If they have not been tested for sleep apnea and I feel they are at increased risk for this diagnosis, then they will be scheduled for a consultation with a Pulmonologist for such. In the event that they elyssa this diagnosis we will have the patient bring their CPAP machine to the hospital for use both postoperatively in the PACU and on the floor at its appropriate setting.  We will have them continue using it while at home after surgery and follow up with their pulmonologist 6 months after to be retested to see if it can be discontinued at that time period. Mr. Dominga Araujo has a reminder for a \"due or due soon\" health maintenance. I have asked that he contact his primary care provider for follow-up on this health maintenance.       Signed By: Jenifer Maxwell MD     January 7, 2019

## 2019-01-07 NOTE — PATIENT INSTRUCTIONS
New patient Instructions      1. Ensure all pre-operative insurances requirements are complete (ie; dietary visits, psychology consults, primary care documentation, etc)    2. Adhere to pre-operative weight loss / weight maintenance plan discussed in the office today. 3. Contact the office with any questions on pre-operative clearance issues (ie; cardiology work-up, pulmonary work-up, upper GI study, etc). 4. If a barium upper GI study has been ordered for your evaluation, make sure you are on liquids only the morning of the procedure. Body Mass Index: Care Instructions  Your Care Instructions    Body mass index (BMI) can help you see if your weight is raising your risk for health problems. It uses a formula to compare how much you weigh with how tall you are. · A BMI lower than 18.5 is considered underweight. · A BMI between 18.5 and 24.9 is considered healthy. · A BMI between 25 and 29.9 is considered overweight. A BMI of 30 or higher is considered obese. If your BMI is in the normal range, it means that you have a lower risk for weight-related health problems. If your BMI is in the overweight or obese range, you may be at increased risk for weight-related health problems, such as high blood pressure, heart disease, stroke, arthritis or joint pain, and diabetes. If your BMI is in the underweight range, you may be at increased risk for health problems such as fatigue, lower protection (immunity) against illness, muscle loss, bone loss, hair loss, and hormone problems. BMI is just one measure of your risk for weight-related health problems. You may be at higher risk for health problems if you are not active, you eat an unhealthy diet, or you drink too much alcohol or use tobacco products. Follow-up care is a key part of your treatment and safety. Be sure to make and go to all appointments, and call your doctor if you are having problems.  It's also a good idea to know your test results and keep a list of the medicines you take. How can you care for yourself at home? · Practice healthy eating habits. This includes eating plenty of fruits, vegetables, whole grains, lean protein, and low-fat dairy. · If your doctor recommends it, get more exercise. Walking is a good choice. Bit by bit, increase the amount you walk every day. Try for at least 30 minutes on most days of the week. · Do not smoke. Smoking can increase your risk for health problems. If you need help quitting, talk to your doctor about stop-smoking programs and medicines. These can increase your chances of quitting for good. · Limit alcohol to 2 drinks a day for men and 1 drink a day for women. Too much alcohol can cause health problems. If you have a BMI higher than 25  · Your doctor may do other tests to check your risk for weight-related health problems. This may include measuring the distance around your waist. A waist measurement of more than 40 inches in men or 35 inches in women can increase the risk of weight-related health problems. · Talk with your doctor about steps you can take to stay healthy or improve your health. You may need to make lifestyle changes to lose weight and stay healthy, such as changing your diet and getting regular exercise. If you have a BMI lower than 18.5  · Your doctor may do other tests to check your risk for health problems. · Talk with your doctor about steps you can take to stay healthy or improve your health. You may need to make lifestyle changes to gain or maintain weight and stay healthy, such as getting more healthy foods in your diet and doing exercises to build muscle. Where can you learn more? Go to http://deepak-zachary.info/. Enter S176 in the search box to learn more about \"Body Mass Index: Care Instructions. \"  Current as of: June 26, 2018  Content Version: 11.8  © 2729-5044 [employer]wise, [employer].  Care instructions adapted under license by [employer] (which disclaims liability or warranty for this information). If you have questions about a medical condition or this instruction, always ask your healthcare professional. [address] any warranty or liability for your use of this information.

## 2019-01-28 ENCOUNTER — OFFICE VISIT (OUTPATIENT)
Dept: SURGERY | Age: 49
End: 2019-01-28

## 2019-01-28 ENCOUNTER — DOCUMENTATION ONLY (OUTPATIENT)
Dept: SURGERY | Age: 49
End: 2019-01-28

## 2019-01-28 ENCOUNTER — HOSPITAL ENCOUNTER (OUTPATIENT)
Dept: PREADMISSION TESTING | Age: 49
Discharge: HOME | End: 2019-01-28
Payer: COMMERCIAL

## 2019-01-28 VITALS
RESPIRATION RATE: 16 BRPM | WEIGHT: 315 LBS | HEIGHT: 69 IN | BODY MASS INDEX: 46.65 KG/M2 | SYSTOLIC BLOOD PRESSURE: 130 MMHG | DIASTOLIC BLOOD PRESSURE: 72 MMHG | HEART RATE: 100 BPM | TEMPERATURE: 98.1 F | OXYGEN SATURATION: 100 %

## 2019-01-28 DIAGNOSIS — E66.01 MORBID OBESITY (HCC): ICD-10-CM

## 2019-01-28 DIAGNOSIS — Z01.812 BLOOD TESTS PRIOR TO TREATMENT OR PROCEDURE: ICD-10-CM

## 2019-01-28 DIAGNOSIS — E66.01 MORBID OBESITY WITH BMI OF 45.0-49.9, ADULT (HCC): Primary | ICD-10-CM

## 2019-01-28 DIAGNOSIS — G89.18 POST-OP PAIN: Primary | ICD-10-CM

## 2019-01-28 DIAGNOSIS — Z01.818 PRE-OP EXAMINATION: ICD-10-CM

## 2019-01-28 DIAGNOSIS — I10 ESSENTIAL HYPERTENSION, MALIGNANT: Primary | ICD-10-CM

## 2019-01-28 DIAGNOSIS — E11.9 TYPE 2 DIABETES MELLITUS WITHOUT COMPLICATION, UNSPECIFIED WHETHER LONG TERM INSULIN USE (HCC): ICD-10-CM

## 2019-01-28 LAB
ABO + RH BLD: NORMAL
ALBUMIN SERPL-MCNC: 4 G/DL (ref 3.4–5)
ALBUMIN/GLOB SERPL: 1.3 {RATIO} (ref 0.8–1.7)
ALP SERPL-CCNC: 52 U/L (ref 45–117)
ALT SERPL-CCNC: 29 U/L (ref 16–61)
ANION GAP SERPL CALC-SCNC: 6 MMOL/L (ref 3–18)
AST SERPL-CCNC: 11 U/L (ref 15–37)
BASOPHILS # BLD: 0.1 K/UL (ref 0–0.1)
BASOPHILS NFR BLD: 1 % (ref 0–2)
BILIRUB SERPL-MCNC: 0.2 MG/DL (ref 0.2–1)
BLOOD GROUP ANTIBODIES SERPL: NORMAL
BUN SERPL-MCNC: 16 MG/DL (ref 7–18)
BUN/CREAT SERPL: 18 (ref 12–20)
CALCIUM SERPL-MCNC: 9.3 MG/DL (ref 8.5–10.1)
CHLORIDE SERPL-SCNC: 103 MMOL/L (ref 100–108)
CO2 SERPL-SCNC: 31 MMOL/L (ref 21–32)
CREAT SERPL-MCNC: 0.89 MG/DL (ref 0.6–1.3)
DIFFERENTIAL METHOD BLD: ABNORMAL
EOSINOPHIL # BLD: 0.6 K/UL (ref 0–0.4)
EOSINOPHIL NFR BLD: 6 % (ref 0–5)
ERYTHROCYTE [DISTWIDTH] IN BLOOD BY AUTOMATED COUNT: 19.2 % (ref 11.6–14.5)
EST. AVERAGE GLUCOSE BLD GHB EST-MCNC: 123 MG/DL
GLOBULIN SER CALC-MCNC: 3 G/DL (ref 2–4)
GLUCOSE SERPL-MCNC: 97 MG/DL (ref 74–99)
HBA1C MFR BLD: 5.9 % (ref 4.2–5.6)
HCT VFR BLD AUTO: 43.7 % (ref 36–48)
HGB BLD-MCNC: 13.2 G/DL (ref 13–16)
LYMPHOCYTES # BLD: 2.4 K/UL (ref 0.9–3.6)
LYMPHOCYTES NFR BLD: 25 % (ref 21–52)
MCH RBC QN AUTO: 22.7 PG (ref 24–34)
MCHC RBC AUTO-ENTMCNC: 30.2 G/DL (ref 31–37)
MCV RBC AUTO: 75.1 FL (ref 74–97)
MONOCYTES # BLD: 1 K/UL (ref 0.05–1.2)
MONOCYTES NFR BLD: 10 % (ref 3–10)
NEUTS SEG # BLD: 5.8 K/UL (ref 1.8–8)
NEUTS SEG NFR BLD: 58 % (ref 40–73)
PLATELET # BLD AUTO: 273 K/UL (ref 135–420)
PMV BLD AUTO: 10.2 FL (ref 9.2–11.8)
POTASSIUM SERPL-SCNC: 4.7 MMOL/L (ref 3.5–5.5)
PROT SERPL-MCNC: 7 G/DL (ref 6.4–8.2)
RBC # BLD AUTO: 5.82 M/UL (ref 4.7–5.5)
SODIUM SERPL-SCNC: 140 MMOL/L (ref 136–145)
SPECIMEN EXP DATE BLD: NORMAL
WBC # BLD AUTO: 9.8 K/UL (ref 4.6–13.2)

## 2019-01-28 PROCEDURE — 80053 COMPREHEN METABOLIC PANEL: CPT

## 2019-01-28 PROCEDURE — 93005 ELECTROCARDIOGRAM TRACING: CPT

## 2019-01-28 PROCEDURE — 36415 COLL VENOUS BLD VENIPUNCTURE: CPT

## 2019-01-28 PROCEDURE — 83036 HEMOGLOBIN GLYCOSYLATED A1C: CPT

## 2019-01-28 PROCEDURE — 85025 COMPLETE CBC W/AUTO DIFF WBC: CPT

## 2019-01-28 PROCEDURE — 86900 BLOOD TYPING SEROLOGIC ABO: CPT

## 2019-01-28 RX ORDER — OXYCODONE AND ACETAMINOPHEN 5; 325 MG/1; MG/1
1 TABLET ORAL
Qty: 30 TAB | Refills: 0 | Status: SHIPPED | OUTPATIENT
Start: 2019-01-28 | End: 2019-04-02

## 2019-01-28 NOTE — PROGRESS NOTES
Jarod Sen attended bariatric surgery preoperative education class today. He appears to have understanding of surgical procedure, pre-op and post-op risks, and lifestyle changes. He asked appropriate questions, and all questions were answered in their entirety.

## 2019-01-28 NOTE — PROGRESS NOTES
Appears to have a good understanding of the diet progression, food choices, and dietary/exercise habits for successful weight loss and nourishment after surgery. The class material included: post-op diet progression, including liquid, pureed, and low fat, low sugar food recommendations; proper food group choices, and encouraging dietary and exercise habits that lead to weight loss success.      Leo Leonard RD

## 2019-01-29 LAB
ATRIAL RATE: 100 BPM
CALCULATED P AXIS, ECG09: 57 DEGREES
CALCULATED R AXIS, ECG10: 86 DEGREES
CALCULATED T AXIS, ECG11: 62 DEGREES
DIAGNOSIS, 93000: NORMAL
P-R INTERVAL, ECG05: 174 MS
Q-T INTERVAL, ECG07: 342 MS
QRS DURATION, ECG06: 98 MS
QTC CALCULATION (BEZET), ECG08: 441 MS
VENTRICULAR RATE, ECG03: 100 BPM

## 2019-01-29 NOTE — PROGRESS NOTES
Sleeve Gastrectomy - History and Physical 
Subjective: The patient is a 46 y.o. obese male with a Body mass index is 47.14 kg/m². Lex Sy he presents now to review their work up to date to see if they are a candidate for surgery and whether or not to proceed with the previously requested procedure. Bariatric comorbidities continue to include:  
Patient Active Problem List  
Diagnosis Code  Bipolar affective disorder, mixed, mild ([facility]) F31.61  
 Alcohol dependence in remission ([facility] F10.21  
 Obesity, morbid ([facility]) E66.01  
 Morbid obesity ([facility]) E66.01  
 Morbid obesity with BMI of 45.0-49.9, adult ([facility]) E66.01, Z68.42  Low testosterone R79.89  Bipolar 1 disorder ([facility]) F31.9  GERD (gastroesophageal reflux disease) K21.9  Hypertension I10  Tremors of nervous system R25.1  Hypercholesteremia E78.00  Arthritis of both knees M17.0  Arthritis of spine M47.819  Arthritis of both hands M19.041, M19.042  Migraines W33.255  Diabetes ([facility] E11.9  Sleep apnea G47.30  
 Hx of cardiomyopathy Z86.79  
 Diastasis recti M62.08  Type 2 diabetes mellitus with diabetic neuropathy ([facility]) E11.40  CVA (cerebral vascular accident) ([facility] I63.9  Angina at rest [facility]) I20.8  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  Arthritis M19.90 They have been generally well prior to this visit and have had no recent significant illnesses. The patient has had no gastrointestinal issues that would preclude them from proceeding with the surgery they have chosen. Feliica Gomez has recently tried a preoperative weight loss program  in addition to seeing a bariatric nutritionist preoperatively. We have discussed on at least one other occasion about the various types of surgical weight loss procedures and they have considered these options after our initial consultation. We have once again discussed these procedures in detail and they have now decided on a surgical procedure. They present today to discuss this and confirm that their evaluation pre operatively is acceptable to continue with surgery. The patient desires laparoscopic sleeve gastrectomy for surgical weight loss. The patients goal weight is 210lb. Patient Active Problem List  
 Diagnosis Date Noted  Arthritis  Angina at rest [facility])  Cerebral vasculitis  Obesity, morbid ([facility] 01/15/2018  Type 2 diabetes mellitus with diabetic neuropathy ([facility] 01/15/2018  Morbid obesity ([facility]  Morbid obesity with BMI of 45.0-49.9, adult ([address]  Low testosterone  Bipolar 1 disorder ([facility]  GERD (gastroesophageal reflux disease)  Hypertension  Tremors of nervous system  Hypercholesteremia  Arthritis of both knees  Arthritis of spine  Arthritis of both hands  Migraines  Diabetes ([facility]  Sleep apnea  Hx of cardiomyopathy  Diastasis recti  CVA (cerebral vascular accident) ([facility]  Bipolar affective disorder, mixed, mild ([facility] 07/07/2017  Alcohol dependence in remission ([facility] 07/07/2017 Past Surgical History:  
Procedure Laterality Date  HX APPENDECTOMY  HX ORTHOPAEDIC    
 CTR both hands  HX ORTHOPAEDIC    
 cervical fusion  HX ORTHOPAEDIC    
 TORN MENISCUS  
 HX TONSILLECTOMY Social History Tobacco Use  Smoking status: Never Smoker  Smokeless tobacco: Never Used Substance Use Topics  Alcohol use: No  
  Comment: recovering alcoholic Family History Problem Relation Age of Onset  Heart Disease Mother  Hypertension Mother  Elevated Lipids Mother  Heart Disease Father  Hypertension Father  Elevated Lipids Father Current Outpatient Medications Medication Sig Dispense Refill  LORazepam (ATIVAN) 2 mg tablet Take  by mouth every six (6) hours as needed for Anxiety.  erenumab-aooe (AIMOVIG AUTOINJECTOR) 70 mg/mL atIn by SubCUTAneous route every thirty (30) days.  risperiDONE (RISPERDAL) 2 mg tablet Take 1 Tab by mouth nightly. (Patient taking differently: Take 4 mg by mouth every other day.) 30 Tab 5  
 buPROPion XL (WELLBUTRIN XL) 300 mg XL tablet Take 1 Tab by mouth every morning. (Patient taking differently: Take 300 mg by mouth every morning.) 30 Tab 5  DULoxetine (CYMBALTA) 60 mg capsule Take 1 Cap by mouth daily. 30 Cap 5  
 gabapentin (NEURONTIN) 600 mg tablet Take 2 Tabs by mouth two (2) times a day. And 1 po at lunch (Patient taking differently: Take 1,200 mg by mouth two (2) times a day.) 150 Tab 5  QUEtiapine (SEROQUEL) 300 mg tablet Take 1 Tab by mouth nightly. 30 Tab 5  
 traZODone (DESYREL) 50 mg tablet Take 1 Tab by mouth nightly as needed for Sleep. 30 Tab 5  
 OXcarbaxepine (TRILEPTAL) 600 mg tablet Take 2 Tabs by mouth two (2) times a day. (Patient taking differently: Take 1,200 mg by mouth two (2) times a day.) 120 Tab 5  
 metFORMIN (GLUCOPHAGE) 500 mg tablet Take 1 Tab by mouth two (2) times daily (with meals). 30 Tab 0  
 Omeprazole delayed release (PRILOSEC D/R) 20 mg tablet Take 40 mg by mouth two (2) times a day.  atorvastatin (LIPITOR) 20 mg tablet Take 40 mg by mouth nightly.  diclofenac potassium (ZIPSOR) 25 mg capsule Take 25 mg by mouth four (4) times daily.  azaTHIOprine (IMURAN) 50 mg tablet Take 100 mg by mouth nightly.  testosterone cypionate (DEPOTESTOTERONE CYPIONATE) 200 mg/mL injection by IntraMUSCular route Twice a month.  gabapentin (NEURONTIN) 600 mg tablet Take  by mouth daily (with lunch).  losartan (COZAAR) 50 mg tablet Take  by mouth daily.  primidone (MYSOLINE) 50 mg tablet Take  by mouth nightly.  cetirizine (ZYRTEC) 10 mg tablet Take  by mouth daily.  multivitamin (ONE A DAY) tablet Take 1 Tab by mouth daily.  HYDROcodone-acetaminophen (NORCO) 5-325 mg per tablet Take  by mouth every eight (8) hours as needed for Pain.  ferrous sulfate (IRON) 325 mg (65 mg iron) tablet Take 130 mg by mouth nightly.  B.infantis-B.ani-B.long-B.bifi (PROBIOTIC 4X) 10-15 mg TbEC Take  by mouth daily.  onabotulinumtoxinA (BOTOX INJECTION) by IntraMUSCular route every three (3) months.  oxyCODONE-acetaminophen (PERCOCET) 5-325 mg per tablet Take 1 Tab by mouth every six (6) hours as needed for Pain. Max Daily Amount: 4 Tabs. 30 Tab 0  
 aspirin 81 mg chewable tablet Take 81 mg by mouth daily. Allergies Allergen Reactions  Sudafed [Pseudoephedrine Hcl] Other (comments) Pt advised not to take due to stroke in aug '16 Review of Systems:  
 
General - No history or complaints of unexpected fever, chills, or weight loss Head/Neck - No history or complaints of headache, diplopia, dysphagia, hearing loss Cardiac - No history or complaints of chest pain, palpitations, murmur, or shortness of breath Pulmonary - No history or complaints of shortness of breath, productive cough, hemoptysis Gastrointestinal - moderate reflux,no  abdominal pain, obstipation/constipation or blood per rectum Genitourinary - No history or complaints of hematuria/dysuria, stress urinary incontinence symptoms, or renal lithiasis Musculoskeletal - severe joint pain in their knees,  no muscular weakness Hematologic - No history or complaints of bleeding disorders,  No blood transfusions Neurologic - No history or complaints of  migraine headaches, seizure activity, syncopal episodes, TIA or stroke Integumentary - No history or complaints of rashes, abnormal nevi, skin cancer Objective:  
 
Visit Vitals /72 (BP 1 Location: Right arm, BP Patient Position: Sitting) Pulse 100 Temp 98.1 °F (36.7 °C) Resp 16 Ht 5' 9\" (1.753 m) Wt 144.8 kg (319 lb 3.2 oz) SpO2 100% BMI 47.14 kg/m² Physical Examination: General appearance - alert, well appearing, and in no distress and oriented to person, place, and time Mental status - alert, oriented to person, place, and time, normal mood, behavior, speech, dress, motor activity, and thought processes Eyes - pupils equal and reactive, extraocular eye movements intact, sclera anicteric, left eye normal, right eye normal 
Ears - bilateral TM's and external ear canals normal, right ear normal, left ear normal 
Nose - normal and patent, no erythema, discharge or polyps Mouth - mucous membranes moist, pharynx normal without lesions Neck - supple, no significant adenopathy Lymphatics - no palpable lymphadenopathy, no hepatosplenomegaly Chest - clear to auscultation, no wheezes, rales or rhonchi, symmetric air entry Heart - normal rate, regular rhythm, normal S1, S2, no murmurs, rubs, clicks or gallops Abdomen - soft, nontender, nondistended, no masses or organomegaly, massive central obesity with severe diastasis recti Back exam - full range of motion, no tenderness, palpable spasm or pain on motion Neurological - alert, oriented, normal speech, no focal findings or movement disorder noted Musculoskeletal - abnormal exam of both lower extremities with limited ROM Extremities - pedal edema 1+ Labs / Preoperative Evaluation:  
 
  
Recent Results (from the past 1008 hour(s)) EKG, 12 LEAD, INITIAL Collection Time: 01/28/19  1:56 PM  
Result Value Ref Range Ventricular Rate 100 BPM  
 Atrial Rate 100 BPM  
 P-R Interval 174 ms QRS Duration 98 ms Q-T Interval 342 ms QTC Calculation (Bezet) 441 ms Calculated P Axis 57 degrees Calculated R Axis 86 degrees Calculated T Axis 62 degrees Diagnosis Normal sinus rhythm Normal ECG No previous ECGs available Confirmed by Keo Hurt MD. ([id number]) on 1/29/2019 9:38:16 PM 
  
CBC WITH AUTOMATED DIFF Collection Time: 01/28/19  2:00 PM  
Result Value Ref Range WBC 9.8 4.6 - 13.2 K/uL  
 RBC 5.82 (H) 4.70 - 5.50 M/uL  
 HGB 13.2 13.0 - 16.0 g/dL HCT 43.7 36.0 - 48.0 % MCV 75.1 74.0 - 97.0 FL  
 MCH 22.7 (L) 24.0 - 34.0 PG  
 MCHC 30.2 (L) 31.0 - 37.0 g/dL  
 RDW 19.2 (H) 11.6 - 14.5 % PLATELET 229 185 - 280 K/uL MPV 10.2 9.2 - 11.8 FL  
 NEUTROPHILS 58 40 - 73 % LYMPHOCYTES 25 21 - 52 % MONOCYTES 10 3 - 10 % EOSINOPHILS 6 (H) 0 - 5 % BASOPHILS 1 0 - 2 %  
 ABS. NEUTROPHILS 5.8 1.8 - 8.0 K/UL  
 ABS. LYMPHOCYTES 2.4 0.9 - 3.6 K/UL  
 ABS. MONOCYTES 1.0 0.05 - 1.2 K/UL  
 ABS. EOSINOPHILS 0.6 (H) 0.0 - 0.4 K/UL  
 ABS. BASOPHILS 0.1 0.0 - 0.1 K/UL  
 DF AUTOMATED METABOLIC PANEL, COMPREHENSIVE Collection Time: 01/28/19  2:00 PM  
Result Value Ref Range Sodium 140 136 - 145 mmol/L Potassium 4.7 3.5 - 5.5 mmol/L Chloride 103 100 - 108 mmol/L  
 CO2 31 21 - 32 mmol/L Anion gap 6 3.0 - 18 mmol/L Glucose 97 74 - 99 mg/dL BUN 16 7.0 - 18 MG/DL Creatinine 0.89 0.6 - 1.3 MG/DL  
 BUN/Creatinine ratio 18 12 - 20 GFR est AA >60 >60 ml/min/1.73m2 GFR est non-AA >60 >60 ml/min/1.73m2 Calcium 9.3 8.5 - 10.1 MG/DL Bilirubin, total 0.2 0.2 - 1.0 MG/DL  
 ALT (SGPT) 29 16 - 61 U/L  
 AST (SGOT) 11 (L) 15 - 37 U/L Alk. phosphatase 52 45 - 117 U/L Protein, total 7.0 6.4 - 8.2 g/dL Albumin 4.0 3.4 - 5.0 g/dL Globulin 3.0 2.0 - 4.0 g/dL A-G Ratio 1.3 0.8 - 1.7 TYPE & SCREEN Collection Time: 01/28/19  2:00 PM  
Result Value Ref Range Crossmatch Expiration 02/08/2019 ABO/Rh(D) AB POSITIVE Antibody screen NEG   
HEMOGLOBIN A1C WITH EAG Collection Time: 01/28/19  2:01 PM  
Result Value Ref Range Hemoglobin A1c 5.9 (H) 4.2 - 5.6 % Est. average glucose 123 mg/dL Assessment: Morbid obesity with comorbidity Plan:  
 
laparoscopic sleeve gastrectomy This is a 46 y.o. male with a BMI of Body mass index is 47.14 kg/m². and the weight-related co-morbidties as noted above.  Trang Grove meets the NIH criteria for bariatric surgery based upon the BMI of Body mass index is 47.14 kg/m². and multiple weight-related co-morbidties. Trang Grove has elected laparoscopic sleeve gastrectomy as his intervention of choice for treatment of morbid obestiy through surgical means secondary to its safety profile, rapid return to work  and decreases in operative risks over gastric bypass. In the office today, following Melo's history and physical examination, a 40 minute discussion regarding the anatomic alterations for the laparoscopic sleeve gastrectomy was undertaken. The dietary expectations and the patient  dependent factors for success were thoroughly discussed, to include the need for interval follow-up and long-term dietary changes associated with success. The possible complications of the sleeve gastrectomy  were also discussed, to include;death, DVT/PE, staple line leak, bleeding, stricture formation, infection, nutritional deficiencies and sleeve dilation. Specific weight related outcomes for success were also discussed with an emphasis on careful and close follow-up with the first year and eating behavior modification as the baseline and cyclical hunger return. The patient expressed an understanding of the above factors, and his questions were answered in their entirety. In addition, the patient attended a 1.5 hour power point seminar regarding obesity, surgical weight loss including, adjustable gastric band, gastric bypass, and sleeve gastrectomy. This discussion contrasted the different surgical techniques, mechanisms of actions and expected outcomes, and surgical and medical risks associated with each procedure. During this seminar, there was a long question and answer session where each questions was answered until there were no additional questions.   
 
Today, the patient had all of his questions answered and the decision was made today that the patient's preoperative evaluation is acceptable for them  to proceed with bariatric surgery  choosing the sleeve gastrectomy as his surgical option. The patient understands the plan of action He has completed all steroids as of Dec 20th 2018 (as stated in Jan 7th office note). He has been minimal weight change over the past 12 months while completing [facility]. He had a cardiac cath in May of 2018 with Dr. Darrion Moya (EF 50%) I have had a long discussion with the patient regarding his massive central obese status and his diastasis recti which is indicative of that issue. I have explained to him that we might not be able to manipulated his stomach or have enough visualization in his abdomen to complete his surgery and might have to back out and have him pursue another medical  weight loss plan. He understands but would like us to make an effort and is comfortable with us proceeding. Secondary Diagnoses:  
 
DVT / Pulmonary Embolus Risk - The patient is at a higher risk for post operative DVT / pulmonary embolus secondary to their morbid obese status, relative sedentary lifestyle, and impending general anesthetic. We will plan to use anticoagulation therapy pre and post operative as well as  pneumatic compression devices and encourage ambulation once on the hospital nursing floor. The need for possible at home anticoagulation therapy has also been discussed and any decision on this matter will be made during post operative evaluations. The patient understands that their efforts at ambulation are of vital importance to reduce the risk of this complication thus placing significant burden on them as to the prevention of such issues. Signs and symptoms of DVT / PE have been discussed with the patient and they have been instructed to call the office if any these occur in the \"at home\" post op phase Obstructive Sleep Apnea -The patient understands the association of sleep apnea and obesity and the additional risk that it caries related to post surgical complications. We will have the patient bring their CPAP machine to the hospital for use both postoperatively in the PACU and on the floor at its appropriate setting.  We will have them continue using it while at home after surgery and follow up with their pulmonologist 6 months after to be retested to see if it can be discontinued at that time period. Hypertension - The patient has a clear understanding of how weight loss improves hypertension as a whole, but also they understand that there is a significant genetic component to this disease process. We will monitor the patients blood pressure while in the hospital and the plan would be to continue those medications postoperatively.  If a diuretic is being used we will stop them on discharge to prevent dehydration particularly with the sleeve gastrectomy and the gastric bypass procedures.  They will be instructed to monitor their blood pressure postoperatively while at home and notify their primary care physician in the event of any significantly high or uncharacteristic readings. Hyperlipidemia - The patient understands that studies show that almost all patient will realize an improvement in their lipid profile with weight loss that occurs with these procedures. They however also understand that hyperlipidemia is a multifactorial disease particularly as it pertains to their genetic background and that there is no guarantee toward cure  of this issue. We will resume their medications immediately postoperatively as this tends to decrease any post operative cardiac events.  The patient will follow up with their family physician in the postoperative period with plans to repeat their lipid panel 2-3 month postoperative for potential adjustment or removal of these medications.  
GERD -The patient understands that weight loss surgery is not a guaranteed cure for reflux disease but does understand the benefits that weight loss can have on reflux disease.  They also understand that at the time of surgery the gastroesophageal junction will be evaluated for the presence of a diaphragmatic hernia.  Hernias will be corrected always with the gastric band and sleeve gastrectomy procedures, but only on a case by case basis with the gastric bypass if it prevents our ability to perform the operation at hand, or if I feel that they would benefit long term with correction of this issue.  The patient also understands that neither weight loss surgery nor repair of a diaphragmatic hernia repair guarantees the complete cessation of the disease. They also understand there is a possibility of recurrence with a simple crural repair as is performed with these procedures. They understand they may have to continue their medications in the postoperative period. They have a good understanding that the gastric bypass procedure is better suited to total resolution of this issue and that neither the Lap Band nor sleeve gastrectomy is considered a curative procedure as it pertains to this diagnosis. Adult Onset Diabetes - The patient has vinicius given a very low carbohydrate diet preoperatively along with instructions to monitor their blood sugars on a regular daily basis. When  their surgery is performed  we will be monitoring the patient with sliding scale insulin and accuchecks.  Based on those values we will determine whether the patient needs a reduction of those medications postoperatively or total removal of those medications on discharge.  We will have the patient continue accuchecks postoperatively while at home also and report to me or their family physician for appropriate adjustments as needed.  The patient also understands that in the event of uncontrolled blood sugar preoperatively that we may choose to postpone their surgery Michaelyn Daily Weight Related Arthritis -The patient understands the benefits that weight loss surgery can have on their arthritis but also understands that weight loss is not a guaranteed cure and relief of symptoms is often dependent on the severity of the underlying disease.  The patient also understands that traditional pharmaceutical treatments for this diagnosis are usually unavailable to post-operative weight loss patients due to the effects on the gastrointestinal tract particularly with the gastric bypass and to a lesser effect with the sleeve gastrectomy.  Any changes to the patients medication treatment will ultimately be made the patients PCP with input by our office. Signed By: Esteban Barajas MD   
 February 2, 2019

## 2019-01-29 NOTE — H&P (VIEW-ONLY)
Sleeve Gastrectomy - History and Physical 
Subjective: The patient is a 46 y.o. obese male with a Body mass index is 47.14 kg/m². Twan [place] he presents now to review their work up to date to see if they are a candidate for surgery and whether or not to proceed with the previously requested procedure. Bariatric comorbidities continue to include:  
Patient Active Problem List  
Diagnosis Code  Bipolar affective disorder, mixed, mild ([facility]) F31.61  
 Alcohol dependence in remission ([email or web address] F10.21  
 Obesity, morbid ([facility]) E66.01  
 Morbid obesity ([facility]) E66.01  
 Morbid obesity with BMI of 45.0-49.9, adult ([facility]) E66.01, Z68.42  Low testosterone R79.89  Bipolar 1 disorder ([facility]) F31.9  GERD (gastroesophageal reflux disease) K21.9  Hypertension I10  Tremors of nervous system R25.1  Hypercholesteremia E78.00  Arthritis of both knees M17.0  Arthritis of spine M47.819  Arthritis of both hands M19.041, M19.042  Migraines F66.748  Diabetes ([email or web address] E11.9  Sleep apnea G47.30  
 Hx of cardiomyopathy Z86.79  
 Diastasis recti M62.08  Type 2 diabetes mellitus with diabetic neuropathy ([facility]) E11.40  CVA (cerebral vascular accident) ([email or web address] I63.9  Angina at rest [facility]) I20.8  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  Arthritis M19.90 They have been generally well prior to this visit and have had no recent significant illnesses. The patient has had no gastrointestinal issues that would preclude them from proceeding with the surgery they have chosen. Magalis Davis has recently tried a preoperative weight loss program  in addition to seeing a bariatric nutritionist preoperatively. We have discussed on at least one other occasion about the various types of surgical weight loss procedures and they have considered these options after our initial consultation. We have once again discussed these procedures in detail and they have now decided on a surgical procedure. They present today to discuss this and confirm that their evaluation pre operatively is acceptable to continue with surgery. The patient desires laparoscopic sleeve gastrectomy for surgical weight loss. The patients goal weight is 210lb. Patient Active Problem List  
 Diagnosis Date Noted  Arthritis  Angina at rest [facility])  Cerebral vasculitis  Obesity, morbid ([facility] 01/15/2018  Type 2 diabetes mellitus with diabetic neuropathy ([facility] 01/15/2018  Morbid obesity ([facility]  Morbid obesity with BMI of 45.0-49.9, adult ([address]  Low testosterone  Bipolar 1 disorder ([facility]  GERD (gastroesophageal reflux disease)  Hypertension  Tremors of nervous system  Hypercholesteremia  Arthritis of both knees  Arthritis of spine  Arthritis of both hands  Migraines  Diabetes ([facility]  Sleep apnea  Hx of cardiomyopathy  Diastasis recti  CVA (cerebral vascular accident) ([facility]  Bipolar affective disorder, mixed, mild ([facility] 07/07/2017  [facility]2017 Past Surgical History:  
Procedure Laterality Date  HX APPENDECTOMY  HX ORTHOPAEDIC    
 CTR both hands  HX ORTHOPAEDIC    
 cervical fusion  HX ORTHOPAEDIC    
 TORN MENISCUS  
 HX TONSILLECTOMY Social History Tobacco Use  Smoking status: Never Smoker  Smokeless tobacco: Never Used Substance Use Topics  Alcohol use: No  
  Comment: recovering alcoholic Family History Problem Relation Age of Onset  Heart Disease Mother  Hypertension Mother  Elevated Lipids Mother  Heart Disease Father  Hypertension Father  Elevated Lipids Father Current Outpatient Medications Medication Sig Dispense Refill  LORazepam (ATIVAN) 2 mg tablet Take  by mouth every six (6) hours as needed for Anxiety.  erenumab-aooe (AIMOVIG AUTOINJECTOR) 70 mg/mL atIn by SubCUTAneous route every thirty (30) days.  risperiDONE (RISPERDAL) 2 mg tablet Take 1 Tab by mouth nightly. (Patient taking differently: Take 4 mg by mouth every other day.) 30 Tab 5  
 buPROPion XL (WELLBUTRIN XL) 300 mg XL tablet Take 1 Tab by mouth every morning. (Patient taking differently: Take 300 mg by mouth every morning.) 30 Tab 5  DULoxetine (CYMBALTA) 60 mg capsule Take 1 Cap by mouth daily. 30 Cap 5  
 gabapentin (NEURONTIN) 600 mg tablet Take 2 Tabs by mouth two (2) times a day. And 1 po at lunch (Patient taking differently: Take 1,200 mg by mouth two (2) times a day.) 150 Tab 5  QUEtiapine (SEROQUEL) 300 mg tablet Take 1 Tab by mouth nightly. 30 Tab 5  
 traZODone (DESYREL) 50 mg tablet Take 1 Tab by mouth nightly as needed for Sleep. 30 Tab 5  
 OXcarbaxepine (TRILEPTAL) 600 mg tablet Take 2 Tabs by mouth two (2) times a day. (Patient taking differently: Take 1,200 mg by mouth two (2) times a day.) 120 Tab 5  
 metFORMIN (GLUCOPHAGE) 500 mg tablet Take 1 Tab by mouth two (2) times daily (with meals). 30 Tab 0  
 Omeprazole delayed release (PRILOSEC D/R) 20 mg tablet Take 40 mg by mouth two (2) times a day.  atorvastatin (LIPITOR) 20 mg tablet Take 40 mg by mouth nightly.  diclofenac potassium (ZIPSOR) 25 mg capsule Take 25 mg by mouth four (4) times daily.  azaTHIOprine (IMURAN) 50 mg tablet Take 100 mg by mouth nightly.  testosterone cypionate (DEPOTESTOTERONE CYPIONATE) 200 mg/mL injection by IntraMUSCular route Twice a month.  gabapentin (NEURONTIN) 600 mg tablet Take  by mouth daily (with lunch).  losartan (COZAAR) 50 mg tablet Take  by mouth daily.  primidone (MYSOLINE) 50 mg tablet Take  by mouth nightly.  cetirizine (ZYRTEC) 10 mg tablet Take  by mouth daily.  multivitamin (ONE A DAY) tablet Take 1 Tab by mouth daily.  HYDROcodone-acetaminophen (NORCO) 5-325 mg per tablet Take  by mouth every eight (8) hours as needed for Pain.  ferrous sulfate (IRON) 325 mg (65 mg iron) tablet Take 130 mg by mouth nightly.  B.infantis-B.ani-B.long-B.bifi (PROBIOTIC 4X) 10-15 mg TbEC Take  by mouth daily.  onabotulinumtoxinA (BOTOX INJECTION) by IntraMUSCular route every three (3) months.  oxyCODONE-acetaminophen (PERCOCET) 5-325 mg per tablet Take 1 Tab by mouth every six (6) hours as needed for Pain. Max Daily Amount: 4 Tabs. 30 Tab 0  
 aspirin 81 mg chewable tablet Take 81 mg by mouth daily. Allergies Allergen Reactions  Sudafed [Pseudoephedrine Hcl] Other (comments) Pt advised not to take due to stroke in aug '16 Review of Systems:  
 
General - No history or complaints of unexpected fever, chills, or weight loss Head/Neck - No history or complaints of headache, diplopia, dysphagia, hearing loss Cardiac - No history or complaints of chest pain, palpitations, murmur, or shortness of breath Pulmonary - No history or complaints of shortness of breath, productive cough, hemoptysis Gastrointestinal - moderate reflux,no  abdominal pain, obstipation/constipation or blood per rectum Genitourinary - No history or complaints of hematuria/dysuria, stress urinary incontinence symptoms, or renal lithiasis Musculoskeletal - severe joint pain in their knees,  no muscular weakness Hematologic - No history or complaints of bleeding disorders,  No blood transfusions Neurologic - No history or complaints of  migraine headaches, seizure activity, syncopal episodes, TIA or stroke Integumentary - No history or complaints of rashes, abnormal nevi, skin cancer Objective:  
 
Visit Vitals /72 (BP 1 Location: Right arm, BP Patient Position: Sitting) Pulse 100 Temp 98.1 °F (36.7 °C) Resp 16 Ht 5' 9\" (1.753 m) Wt 144.8 kg (319 lb 3.2 oz) SpO2 100% BMI 47.14 kg/m² Physical Examination: General appearance - alert, well appearing, and in no distress and oriented to person, place, and time Mental status - alert, oriented to person, place, and time, normal mood, behavior, speech, dress, motor activity, and thought processes Eyes - pupils equal and reactive, extraocular eye movements intact, sclera anicteric, left eye normal, right eye normal 
Ears - bilateral TM's and external ear canals normal, right ear normal, left ear normal 
Nose - normal and patent, no erythema, discharge or polyps Mouth - mucous membranes moist, pharynx normal without lesions Neck - supple, no significant adenopathy Lymphatics - no palpable lymphadenopathy, no hepatosplenomegaly Chest - clear to auscultation, no wheezes, rales or rhonchi, symmetric air entry Heart - normal rate, regular rhythm, normal S1, S2, no murmurs, rubs, clicks or gallops Abdomen - soft, nontender, nondistended, no masses or organomegaly, massive central obesity with severe diastasis recti Back exam - full range of motion, no tenderness, palpable spasm or pain on motion Neurological - alert, oriented, normal speech, no focal findings or movement disorder noted Musculoskeletal - abnormal exam of both lower extremities with limited ROM Extremities - pedal edema 1+ Labs / Preoperative Evaluation:  
 
  
Recent Results (from the past 1008 hour(s)) EKG, 12 LEAD, INITIAL Collection Time: 01/28/19  1:56 PM  
Result Value Ref Range Ventricular Rate 100 BPM  
 Atrial Rate 100 BPM  
 P-R Interval 174 ms QRS Duration 98 ms Q-T Interval 342 ms QTC Calculation (Bezet) 441 ms Calculated P Axis 57 degrees Calculated R Axis 86 degrees Calculated T Axis 62 degrees Diagnosis Normal sinus rhythm Normal ECG No previous ECGs available Confirmed by Gigi Kraft MD. ([id number]) on 1/29/2019 9:38:16 PM 
  
CBC WITH AUTOMATED DIFF Collection Time: 01/28/19  2:00 PM  
Result Value Ref Range WBC 9.8 4.6 - 13.2 K/uL  
 RBC 5.82 (H) 4.70 - 5.50 M/uL  
 HGB 13.2 13.0 - 16.0 g/dL HCT 43.7 36.0 - 48.0 % MCV 75.1 74.0 - 97.0 FL  
 MCH 22.7 (L) 24.0 - 34.0 PG  
 MCHC 30.2 (L) 31.0 - 37.0 g/dL  
 RDW 19.2 (H) 11.6 - 14.5 % PLATELET 260 560 - 047 K/uL MPV 10.2 9.2 - 11.8 FL  
 NEUTROPHILS 58 40 - 73 % LYMPHOCYTES 25 21 - 52 % MONOCYTES 10 3 - 10 % EOSINOPHILS 6 (H) 0 - 5 % BASOPHILS 1 0 - 2 %  
 ABS. NEUTROPHILS 5.8 1.8 - 8.0 K/UL  
 ABS. LYMPHOCYTES 2.4 0.9 - 3.6 K/UL  
 ABS. MONOCYTES 1.0 0.05 - 1.2 K/UL  
 ABS. EOSINOPHILS 0.6 (H) 0.0 - 0.4 K/UL  
 ABS. BASOPHILS 0.1 0.0 - 0.1 K/UL  
 DF AUTOMATED METABOLIC PANEL, COMPREHENSIVE Collection Time: 01/28/19  2:00 PM  
Result Value Ref Range Sodium 140 136 - 145 mmol/L Potassium 4.7 3.5 - 5.5 mmol/L Chloride 103 100 - 108 mmol/L  
 CO2 31 21 - 32 mmol/L Anion gap 6 3.0 - 18 mmol/L Glucose 97 74 - 99 mg/dL BUN 16 7.0 - 18 MG/DL Creatinine 0.89 0.6 - 1.3 MG/DL  
 BUN/Creatinine ratio 18 12 - 20 GFR est AA >60 >60 ml/min/1.73m2 GFR est non-AA >60 >60 ml/min/1.73m2 Calcium 9.3 8.5 - 10.1 MG/DL Bilirubin, total 0.2 0.2 - 1.0 MG/DL  
 ALT (SGPT) 29 16 - 61 U/L  
 AST (SGOT) 11 (L) 15 - 37 U/L Alk. phosphatase 52 45 - 117 U/L Protein, total 7.0 6.4 - 8.2 g/dL Albumin 4.0 3.4 - 5.0 g/dL Globulin 3.0 2.0 - 4.0 g/dL A-G Ratio 1.3 0.8 - 1.7 TYPE & SCREEN Collection Time: 01/28/19  2:00 PM  
Result Value Ref Range Crossmatch Expiration 02/08/2019 ABO/Rh(D) AB POSITIVE Antibody screen NEG   
HEMOGLOBIN A1C WITH EAG Collection Time: 01/28/19  2:01 PM  
Result Value Ref Range Hemoglobin A1c 5.9 (H) 4.2 - 5.6 % Est. average glucose 123 mg/dL Assessment: Morbid obesity with comorbidity Plan:  
 
laparoscopic sleeve gastrectomy This is a 46 y.o. male with a BMI of Body mass index is 47.14 kg/m². and the weight-related co-morbidties as noted above.  Sharif Bhatt meets the NIH criteria for bariatric surgery based upon the BMI of Body mass index is 47.14 kg/m². and multiple weight-related co-morbidties. Taz Wang has elected laparoscopic sleeve gastrectomy as his intervention of choice for treatment of morbid obestiy through surgical means secondary to its safety profile, rapid return to work  and decreases in operative risks over gastric bypass. In the office today, following Melo's history and physical examination, a 40 minute discussion regarding the anatomic alterations for the laparoscopic sleeve gastrectomy was undertaken. The dietary expectations and the patient  dependent factors for success were thoroughly discussed, to include the need for interval follow-up and long-term dietary changes associated with success. The possible complications of the sleeve gastrectomy  were also discussed, to include;death, DVT/PE, staple line leak, bleeding, stricture formation, infection, nutritional deficiencies and sleeve dilation. Specific weight related outcomes for success were also discussed with an emphasis on careful and close follow-up with the first year and eating behavior modification as the baseline and cyclical hunger return. The patient expressed an understanding of the above factors, and his questions were answered in their entirety. In addition, the patient attended a 1.5 hour power point seminar regarding obesity, surgical weight loss including, adjustable gastric band, gastric bypass, and sleeve gastrectomy. This discussion contrasted the different surgical techniques, mechanisms of actions and expected outcomes, and surgical and medical risks associated with each procedure. During this seminar, there was a long question and answer session where each questions was answered until there were no additional questions.   
 
Today, the patient had all of his questions answered and the decision was made today that the patient's preoperative evaluation is acceptable for them  to proceed with bariatric surgery  choosing the sleeve gastrectomy as his surgical option. The patient understands the plan of action He has completed all steroids as of Dec 20th 2018 (as stated in Jan 7th office note). He has been minimal weight change over the past 12 months while completing [facility]. He had a cardiac cath in May of 2018 with Dr. Bereket Jimenez (EF 50%) I have had a long discussion with the patient regarding his massive central obese status and his diastasis recti which is indicative of that issue. I have explained to him that we might not be able to manipulated his stomach or have enough visualization in his abdomen to complete his surgery and might have to back out and have him pursue another medical  weight loss plan. He understands but would like us to make an effort and is comfortable with us proceeding. Secondary Diagnoses:  
 
DVT / Pulmonary Embolus Risk - The patient is at a higher risk for post operative DVT / pulmonary embolus secondary to their morbid obese status, relative sedentary lifestyle, and impending general anesthetic. We will plan to use anticoagulation therapy pre and post operative as well as  pneumatic compression devices and encourage ambulation once on the hospital nursing floor. The need for possible at home anticoagulation therapy has also been discussed and any decision on this matter will be made during post operative evaluations. The patient understands that their efforts at ambulation are of vital importance to reduce the risk of this complication thus placing significant burden on them as to the prevention of such issues. Signs and symptoms of DVT / PE have been discussed with the patient and they have been instructed to call the office if any these occur in the \"at home\" post op phase Obstructive Sleep Apnea -The patient understands the association of sleep apnea and obesity and the additional risk that it caries related to post surgical complications. We will have the patient bring their CPAP machine to the hospital for use both postoperatively in the PACU and on the floor at its appropriate setting.  We will have them continue using it while at home after surgery and follow up with their pulmonologist 6 months after to be retested to see if it can be discontinued at that time period. Hypertension - The patient has a clear understanding of how weight loss improves hypertension as a whole, but also they understand that there is a significant genetic component to this disease process. We will monitor the patients blood pressure while in the hospital and the plan would be to continue those medications postoperatively.  If a diuretic is being used we will stop them on discharge to prevent dehydration particularly with the sleeve gastrectomy and the gastric bypass procedures.  They will be instructed to monitor their blood pressure postoperatively while at home and notify their primary care physician in the event of any significantly high or uncharacteristic readings. Hyperlipidemia - The patient understands that studies show that almost all patient will realize an improvement in their lipid profile with weight loss that occurs with these procedures. They however also understand that hyperlipidemia is a multifactorial disease particularly as it pertains to their genetic background and that there is no guarantee toward cure  of this issue. We will resume their medications immediately postoperatively as this tends to decrease any post operative cardiac events.  The patient will follow up with their family physician in the postoperative period with plans to repeat their lipid panel 2-3 month postoperative for potential adjustment or removal of these medications.  
GERD -The patient understands that weight loss surgery is not a guaranteed cure for reflux disease but does understand the benefits that weight loss can have on reflux disease.  They also understand that at the time of surgery the gastroesophageal junction will be evaluated for the presence of a diaphragmatic hernia.  Hernias will be corrected always with the gastric band and sleeve gastrectomy procedures, but only on a case by case basis with the gastric bypass if it prevents our ability to perform the operation at hand, or if I feel that they would benefit long term with correction of this issue.  The patient also understands that neither weight loss surgery nor repair of a diaphragmatic hernia repair guarantees the complete cessation of the disease. They also understand there is a possibility of recurrence with a simple crural repair as is performed with these procedures. They understand they may have to continue their medications in the postoperative period. They have a good understanding that the gastric bypass procedure is better suited to total resolution of this issue and that neither the Lap Band nor sleeve gastrectomy is considered a curative procedure as it pertains to this diagnosis. Adult Onset Diabetes - The patient has vinicius given a very low carbohydrate diet preoperatively along with instructions to monitor their blood sugars on a regular daily basis. When  their surgery is performed  we will be monitoring the patient with sliding scale insulin and accuchecks.  Based on those values we will determine whether the patient needs a reduction of those medications postoperatively or total removal of those medications on discharge.  We will have the patient continue accuchecks postoperatively while at home also and report to me or their family physician for appropriate adjustments as needed.  The patient also understands that in the event of uncontrolled blood sugar preoperatively that we may choose to postpone their surgery Michaelyn Daily Weight Related Arthritis -The patient understands the benefits that weight loss surgery can have on their arthritis but also understands that weight loss is not a guaranteed cure and relief of symptoms is often dependent on the severity of the underlying disease.  The patient also understands that traditional pharmaceutical treatments for this diagnosis are usually unavailable to post-operative weight loss patients due to the effects on the gastrointestinal tract particularly with the gastric bypass and to a lesser effect with the sleeve gastrectomy.  Any changes to the patients medication treatment will ultimately be made the patients PCP with input by our office. Signed By: Elizabeth Wlilis MD   
 February 2, 2019

## 2019-02-01 ENCOUNTER — OFFICE VISIT (OUTPATIENT)
Dept: SURGERY | Age: 49
End: 2019-02-01

## 2019-02-01 VITALS
TEMPERATURE: 98.7 F | OXYGEN SATURATION: 99 % | WEIGHT: 315 LBS | HEART RATE: 94 BPM | BODY MASS INDEX: 46.65 KG/M2 | SYSTOLIC BLOOD PRESSURE: 139 MMHG | HEIGHT: 69 IN | DIASTOLIC BLOOD PRESSURE: 80 MMHG

## 2019-02-01 DIAGNOSIS — E66.01 MORBID OBESITY (HCC): Primary | ICD-10-CM

## 2019-02-01 NOTE — PATIENT INSTRUCTIONS
Body Mass Index: Care Instructions  Your Care Instructions    Body mass index (BMI) can help you see if your weight is raising your risk for health problems. It uses a formula to compare how much you weigh with how tall you are. · A BMI lower than 18.5 is considered underweight. · A BMI between 18.5 and 24.9 is considered healthy. · A BMI between 25 and 29.9 is considered overweight. A BMI of 30 or higher is considered obese. If your BMI is in the normal range, it means that you have a lower risk for weight-related health problems. If your BMI is in the overweight or obese range, you may be at increased risk for weight-related health problems, such as high blood pressure, heart disease, stroke, arthritis or joint pain, and diabetes. If your BMI is in the underweight range, you may be at increased risk for health problems such as fatigue, lower protection (immunity) against illness, muscle loss, bone loss, hair loss, and hormone problems. BMI is just one measure of your risk for weight-related health problems. You may be at higher risk for health problems if you are not active, you eat an unhealthy diet, or you drink too much alcohol or use tobacco products. Follow-up care is a key part of your treatment and safety. Be sure to make and go to all appointments, and call your doctor if you are having problems. It's also a good idea to know your test results and keep a list of the medicines you take. How can you care for yourself at home? · Practice healthy eating habits. This includes eating plenty of fruits, vegetables, whole grains, lean protein, and low-fat dairy. · If your doctor recommends it, get more exercise. Walking is a good choice. Bit by bit, increase the amount you walk every day. Try for at least 30 minutes on most days of the week. · Do not smoke. Smoking can increase your risk for health problems. If you need help quitting, talk to your doctor about stop-smoking programs and medicines. These can increase your chances of quitting for good. · Limit alcohol to 2 drinks a day for men and 1 drink a day for women. Too much alcohol can cause health problems. If you have a BMI higher than 25  · Your doctor may do other tests to check your risk for weight-related health problems. This may include measuring the distance around your waist. A waist measurement of more than 40 inches in men or 35 inches in women can increase the risk of weight-related health problems. · Talk with your doctor about steps you can take to stay healthy or improve your health. You may need to make lifestyle changes to lose weight and stay healthy, such as changing your diet and getting regular exercise. If you have a BMI lower than 18.5  · Your doctor may do other tests to check your risk for health problems. · Talk with your doctor about steps you can take to stay healthy or improve your health. You may need to make lifestyle changes to gain or maintain weight and stay healthy, such as getting more healthy foods in your diet and doing exercises to build muscle. Where can you learn more? Go to http://deepak-zachary.info/. Enter S176 in the search box to learn more about \"Body Mass Index: Care Instructions. \"  Current as of: June 25, 2018  Content Version: 11.9  © 6437-0612 [employer]. Care instructions adapted under license by [employer] (which disclaims liability or warranty for this information). If you have questions about a medical condition or this instruction, always ask your healthcare professional. [address] any warranty or liability for your use of this information.

## 2019-02-05 ENCOUNTER — ANESTHESIA (OUTPATIENT)
Dept: SURGERY | Age: 49
DRG: 621 | End: 2019-02-05
Payer: COMMERCIAL

## 2019-02-05 ENCOUNTER — ANESTHESIA EVENT (OUTPATIENT)
Dept: SURGERY | Age: 49
DRG: 621 | End: 2019-02-05
Payer: COMMERCIAL

## 2019-02-05 ENCOUNTER — HOSPITAL ENCOUNTER (INPATIENT)
Age: 49
LOS: 1 days | Discharge: HOME | DRG: 621 | End: 2019-02-06
Attending: SPECIALIST | Admitting: SPECIALIST
Payer: COMMERCIAL

## 2019-02-05 LAB
ALBUMIN SERPL-MCNC: 3.7 G/DL (ref 3.4–5)
ALBUMIN/GLOB SERPL: 1.3 {RATIO} (ref 0.8–1.7)
ALP SERPL-CCNC: 50 U/L (ref 45–117)
ALT SERPL-CCNC: 48 U/L (ref 16–61)
ANION GAP SERPL CALC-SCNC: 10 MMOL/L (ref 3–18)
AST SERPL-CCNC: 32 U/L (ref 15–37)
BASOPHILS # BLD: 0 K/UL (ref 0–0.1)
BASOPHILS NFR BLD: 0 % (ref 0–2)
BILIRUB SERPL-MCNC: 0.5 MG/DL (ref 0.2–1)
BUN SERPL-MCNC: 13 MG/DL (ref 7–18)
BUN/CREAT SERPL: 16 (ref 12–20)
CALCIUM SERPL-MCNC: 8.4 MG/DL (ref 8.5–10.1)
CHLORIDE SERPL-SCNC: 103 MMOL/L (ref 100–108)
CO2 SERPL-SCNC: 26 MMOL/L (ref 21–32)
CREAT SERPL-MCNC: 0.8 MG/DL (ref 0.6–1.3)
DIFFERENTIAL METHOD BLD: ABNORMAL
EOSINOPHIL # BLD: 0.1 K/UL (ref 0–0.4)
EOSINOPHIL NFR BLD: 1 % (ref 0–5)
ERYTHROCYTE [DISTWIDTH] IN BLOOD BY AUTOMATED COUNT: 21.1 % (ref 11.6–14.5)
EST. AVERAGE GLUCOSE BLD GHB EST-MCNC: 148 MG/DL
GLOBULIN SER CALC-MCNC: 2.9 G/DL (ref 2–4)
GLUCOSE BLD STRIP.AUTO-MCNC: 120 MG/DL (ref 70–110)
GLUCOSE BLD STRIP.AUTO-MCNC: 92 MG/DL (ref 70–110)
GLUCOSE BLD STRIP.AUTO-MCNC: 94 MG/DL (ref 70–110)
GLUCOSE BLD STRIP.AUTO-MCNC: 98 MG/DL (ref 70–110)
GLUCOSE SERPL-MCNC: 98 MG/DL (ref 74–99)
HBA1C MFR BLD: 6.8 % (ref 4.2–5.6)
HCT VFR BLD AUTO: 42.8 % (ref 36–48)
HGB BLD-MCNC: 13 G/DL (ref 13–16)
LYMPHOCYTES # BLD: 1.3 K/UL (ref 0.9–3.6)
LYMPHOCYTES NFR BLD: 11 % (ref 21–52)
MCH RBC QN AUTO: 23.5 PG (ref 24–34)
MCHC RBC AUTO-ENTMCNC: 30.4 G/DL (ref 31–37)
MCV RBC AUTO: 77.4 FL (ref 74–97)
MONOCYTES # BLD: 1 K/UL (ref 0.05–1.2)
MONOCYTES NFR BLD: 8 % (ref 3–10)
NEUTS SEG # BLD: 10 K/UL (ref 1.8–8)
NEUTS SEG NFR BLD: 80 % (ref 40–73)
PLATELET # BLD AUTO: 203 K/UL (ref 135–420)
PMV BLD AUTO: 10.3 FL (ref 9.2–11.8)
POTASSIUM SERPL-SCNC: 4.6 MMOL/L (ref 3.5–5.5)
PROT SERPL-MCNC: 6.6 G/DL (ref 6.4–8.2)
RBC # BLD AUTO: 5.53 M/UL (ref 4.7–5.5)
SODIUM SERPL-SCNC: 139 MMOL/L (ref 136–145)
WBC # BLD AUTO: 12.5 K/UL (ref 4.6–13.2)

## 2019-02-05 PROCEDURE — 77030010515 HC APPL ENDOCLP LIG J&J -B: Performed by: SPECIALIST

## 2019-02-05 PROCEDURE — 77030020782 HC GWN BAIR PAWS FLX 3M -B: Performed by: SPECIALIST

## 2019-02-05 PROCEDURE — 77030012893: Performed by: SPECIALIST

## 2019-02-05 PROCEDURE — 77030022871 HC STPL REINF STRP BAXT -C: Performed by: SPECIALIST

## 2019-02-05 PROCEDURE — 74011000250 HC RX REV CODE- 250: Performed by: SPECIALIST

## 2019-02-05 PROCEDURE — 77030003580 HC NDL INSUF VERES J&J -B: Performed by: SPECIALIST

## 2019-02-05 PROCEDURE — 74011250636 HC RX REV CODE- 250/636: Performed by: SPECIALIST

## 2019-02-05 PROCEDURE — 76010000153 HC OR TIME 1.5 TO 2 HR: Performed by: SPECIALIST

## 2019-02-05 PROCEDURE — 0DJ08ZZ INSPECTION OF UPPER INTESTINAL TRACT, VIA NATURAL OR ARTIFICIAL OPENING ENDOSCOPIC: ICD-10-PCS | Performed by: SPECIALIST

## 2019-02-05 PROCEDURE — 88307 TISSUE EXAM BY PATHOLOGIST: CPT

## 2019-02-05 PROCEDURE — 76210000017 HC OR PH I REC 1.5 TO 2 HR: Performed by: SPECIALIST

## 2019-02-05 PROCEDURE — 74011250636 HC RX REV CODE- 250/636

## 2019-02-05 PROCEDURE — 77030002912 HC SUT ETHBND J&J -A: Performed by: SPECIALIST

## 2019-02-05 PROCEDURE — 77030020255 HC SOL INJ LR 1000ML BG: Performed by: SPECIALIST

## 2019-02-05 PROCEDURE — 76060000034 HC ANESTHESIA 1.5 TO 2 HR: Performed by: SPECIALIST

## 2019-02-05 PROCEDURE — 77030002966 HC SUT PDS J&J -A: Performed by: SPECIALIST

## 2019-02-05 PROCEDURE — 77030008683 HC TU ET CUF COVD -A: Performed by: ANESTHESIOLOGY

## 2019-02-05 PROCEDURE — 88313 SPECIAL STAINS GROUP 2: CPT

## 2019-02-05 PROCEDURE — 83036 HEMOGLOBIN GLYCOSYLATED A1C: CPT

## 2019-02-05 PROCEDURE — 77030022585 HC SEAL FBRN EVICEL J&J -F: Performed by: SPECIALIST

## 2019-02-05 PROCEDURE — 77030009968 HC RELD STPLR ENDOSC J&J -D: Performed by: SPECIALIST

## 2019-02-05 PROCEDURE — 88305 TISSUE EXAM BY PATHOLOGIST: CPT

## 2019-02-05 PROCEDURE — 77030013567 HC DRN WND RESERV BARD -A: Performed by: SPECIALIST

## 2019-02-05 PROCEDURE — 77030027876 HC STPLR ENDOSC FLX PWR J&J -G1: Performed by: SPECIALIST

## 2019-02-05 PROCEDURE — 77030008477 HC STYL SATN SLP COVD -A: Performed by: ANESTHESIOLOGY

## 2019-02-05 PROCEDURE — 65270000029 HC RM PRIVATE

## 2019-02-05 PROCEDURE — 85025 COMPLETE CBC W/AUTO DIFF WBC: CPT

## 2019-02-05 PROCEDURE — 74011250636 HC RX REV CODE- 250/636: Performed by: ANESTHESIOLOGY

## 2019-02-05 PROCEDURE — 80053 COMPREHEN METABOLIC PANEL: CPT

## 2019-02-05 PROCEDURE — 77030006643: Performed by: ANESTHESIOLOGY

## 2019-02-05 PROCEDURE — 77030034154 HC SHR COAG HARM ACE J&J -F: Performed by: SPECIALIST

## 2019-02-05 PROCEDURE — 74011250637 HC RX REV CODE- 250/637: Performed by: SPECIALIST

## 2019-02-05 PROCEDURE — 82962 GLUCOSE BLOOD TEST: CPT

## 2019-02-05 PROCEDURE — 77030032490 HC SLV COMPR SCD KNE COVD -B: Performed by: SPECIALIST

## 2019-02-05 PROCEDURE — 77030012407 HC DRN WND BARD -B: Performed by: SPECIALIST

## 2019-02-05 PROCEDURE — 0DB64Z3 EXCISION OF STOMACH, PERCUTANEOUS ENDOSCOPIC APPROACH, VERTICAL: ICD-10-PCS | Performed by: SPECIALIST

## 2019-02-05 PROCEDURE — 77030002933 HC SUT MCRYL J&J -A: Performed by: SPECIALIST

## 2019-02-05 PROCEDURE — 77030009426 HC FCPS BIOP ENDOSC BSC -B: Performed by: SPECIALIST

## 2019-02-05 PROCEDURE — 77030027138 HC INCENT SPIROMETER -A

## 2019-02-05 PROCEDURE — 77030002916 HC SUT ETHLN J&J -A: Performed by: SPECIALIST

## 2019-02-05 PROCEDURE — 77030034029 HC SLV GASTRCTMY CAL SYS DISP BOEH -C: Performed by: SPECIALIST

## 2019-02-05 PROCEDURE — 77010033678 HC OXYGEN DAILY

## 2019-02-05 PROCEDURE — 77030008603 HC TRCR ENDOSC EPATH J&J -C: Performed by: SPECIALIST

## 2019-02-05 PROCEDURE — 0FB04ZX EXCISION OF LIVER, PERCUTANEOUS ENDOSCOPIC APPROACH, DIAGNOSTIC: ICD-10-PCS | Performed by: SPECIALIST

## 2019-02-05 PROCEDURE — 36415 COLL VENOUS BLD VENIPUNCTURE: CPT

## 2019-02-05 PROCEDURE — 77030008602 HC TRCR ENDOSC EPATH J&J -B: Performed by: SPECIALIST

## 2019-02-05 PROCEDURE — 74011000250 HC RX REV CODE- 250

## 2019-02-05 PROCEDURE — 77030018836 HC SOL IRR NACL ICUM -A: Performed by: SPECIALIST

## 2019-02-05 PROCEDURE — 77030033200 HC PRT CLSR CRTR THOMP COOP -C: Performed by: SPECIALIST

## 2019-02-05 DEVICE — IMPLANTABLE DEVICE: Type: IMPLANTABLE DEVICE | Site: STOMACH | Status: FUNCTIONAL

## 2019-02-05 RX ORDER — SODIUM CHLORIDE 0.9 % (FLUSH) 0.9 %
5-40 SYRINGE (ML) INJECTION EVERY 8 HOURS
Status: DISCONTINUED | OUTPATIENT
Start: 2019-02-05 | End: 2019-02-05 | Stop reason: HOSPADM

## 2019-02-05 RX ORDER — DEXTROSE 50 % IN WATER (D50W) INTRAVENOUS SYRINGE
25-50 AS NEEDED
Status: DISCONTINUED | OUTPATIENT
Start: 2019-02-05 | End: 2019-02-06 | Stop reason: HOSPADM

## 2019-02-05 RX ORDER — MIDAZOLAM HYDROCHLORIDE 1 MG/ML
INJECTION, SOLUTION INTRAMUSCULAR; INTRAVENOUS AS NEEDED
Status: DISCONTINUED | OUTPATIENT
Start: 2019-02-05 | End: 2019-02-05 | Stop reason: HOSPADM

## 2019-02-05 RX ORDER — NYSTATIN 100000 [USP'U]/ML
500000 SUSPENSION ORAL
Status: COMPLETED | OUTPATIENT
Start: 2019-02-05 | End: 2019-02-05

## 2019-02-05 RX ORDER — FAMOTIDINE 20 MG/50ML
20 INJECTION, SOLUTION INTRAVENOUS ONCE
Status: DISCONTINUED | OUTPATIENT
Start: 2019-02-05 | End: 2019-02-05 | Stop reason: RX

## 2019-02-05 RX ORDER — SODIUM CHLORIDE 0.9 % (FLUSH) 0.9 %
5-40 SYRINGE (ML) INJECTION AS NEEDED
Status: DISCONTINUED | OUTPATIENT
Start: 2019-02-05 | End: 2019-02-05 | Stop reason: HOSPADM

## 2019-02-05 RX ORDER — SODIUM CHLORIDE, SODIUM LACTATE, POTASSIUM CHLORIDE, CALCIUM CHLORIDE 600; 310; 30; 20 MG/100ML; MG/100ML; MG/100ML; MG/100ML
150 INJECTION, SOLUTION INTRAVENOUS CONTINUOUS
Status: DISCONTINUED | OUTPATIENT
Start: 2019-02-05 | End: 2019-02-06 | Stop reason: HOSPADM

## 2019-02-05 RX ORDER — FENTANYL CITRATE 50 UG/ML
INJECTION, SOLUTION INTRAMUSCULAR; INTRAVENOUS AS NEEDED
Status: DISCONTINUED | OUTPATIENT
Start: 2019-02-05 | End: 2019-02-05 | Stop reason: HOSPADM

## 2019-02-05 RX ORDER — CEFAZOLIN SODIUM 2 G/50ML
2 SOLUTION INTRAVENOUS EVERY 8 HOURS
Status: DISCONTINUED | OUTPATIENT
Start: 2019-02-05 | End: 2019-02-05 | Stop reason: DRUGHIGH

## 2019-02-05 RX ORDER — ENOXAPARIN SODIUM 100 MG/ML
40 INJECTION SUBCUTANEOUS EVERY 12 HOURS
Status: DISCONTINUED | OUTPATIENT
Start: 2019-02-05 | End: 2019-02-06 | Stop reason: HOSPADM

## 2019-02-05 RX ORDER — SODIUM CHLORIDE, SODIUM LACTATE, POTASSIUM CHLORIDE, CALCIUM CHLORIDE 600; 310; 30; 20 MG/100ML; MG/100ML; MG/100ML; MG/100ML
125 INJECTION, SOLUTION INTRAVENOUS CONTINUOUS
Status: DISCONTINUED | OUTPATIENT
Start: 2019-02-05 | End: 2019-02-05

## 2019-02-05 RX ORDER — INSULIN LISPRO 100 [IU]/ML
INJECTION, SOLUTION INTRAVENOUS; SUBCUTANEOUS ONCE
Status: CANCELLED | OUTPATIENT
Start: 2019-02-05 | End: 2019-02-05

## 2019-02-05 RX ORDER — ACETAMINOPHEN 10 MG/ML
1000 INJECTION, SOLUTION INTRAVENOUS ONCE
Status: COMPLETED | OUTPATIENT
Start: 2019-02-05 | End: 2019-02-05

## 2019-02-05 RX ORDER — SODIUM CHLORIDE, SODIUM LACTATE, POTASSIUM CHLORIDE, CALCIUM CHLORIDE 600; 310; 30; 20 MG/100ML; MG/100ML; MG/100ML; MG/100ML
125 INJECTION, SOLUTION INTRAVENOUS CONTINUOUS
Status: DISCONTINUED | OUTPATIENT
Start: 2019-02-05 | End: 2019-02-05 | Stop reason: HOSPADM

## 2019-02-05 RX ORDER — NEOSTIGMINE METHYLSULFATE 5 MG/5 ML
SYRINGE (ML) INTRAVENOUS AS NEEDED
Status: DISCONTINUED | OUTPATIENT
Start: 2019-02-05 | End: 2019-02-05 | Stop reason: HOSPADM

## 2019-02-05 RX ORDER — KETAMINE HYDROCHLORIDE 10 MG/ML
INJECTION, SOLUTION INTRAMUSCULAR; INTRAVENOUS AS NEEDED
Status: DISCONTINUED | OUTPATIENT
Start: 2019-02-05 | End: 2019-02-05 | Stop reason: HOSPADM

## 2019-02-05 RX ORDER — INSULIN LISPRO 100 [IU]/ML
INJECTION, SOLUTION INTRAVENOUS; SUBCUTANEOUS ONCE
Status: DISCONTINUED | OUTPATIENT
Start: 2019-02-05 | End: 2019-02-05 | Stop reason: HOSPADM

## 2019-02-05 RX ORDER — DIPHENHYDRAMINE HYDROCHLORIDE 50 MG/ML
25 INJECTION, SOLUTION INTRAMUSCULAR; INTRAVENOUS
Status: DISCONTINUED | OUTPATIENT
Start: 2019-02-05 | End: 2019-02-06 | Stop reason: HOSPADM

## 2019-02-05 RX ORDER — DEXTROSE 50 % IN WATER (D50W) INTRAVENOUS SYRINGE
25-50 AS NEEDED
Status: DISCONTINUED | OUTPATIENT
Start: 2019-02-05 | End: 2019-02-05 | Stop reason: HOSPADM

## 2019-02-05 RX ORDER — KETOROLAC TROMETHAMINE 30 MG/ML
15 INJECTION, SOLUTION INTRAMUSCULAR; INTRAVENOUS EVERY 6 HOURS
Status: DISCONTINUED | OUTPATIENT
Start: 2019-02-05 | End: 2019-02-06 | Stop reason: HOSPADM

## 2019-02-05 RX ORDER — ONDANSETRON 2 MG/ML
INJECTION INTRAMUSCULAR; INTRAVENOUS AS NEEDED
Status: DISCONTINUED | OUTPATIENT
Start: 2019-02-05 | End: 2019-02-05 | Stop reason: HOSPADM

## 2019-02-05 RX ORDER — INSULIN LISPRO 100 [IU]/ML
INJECTION, SOLUTION INTRAVENOUS; SUBCUTANEOUS EVERY 6 HOURS
Status: DISCONTINUED | OUTPATIENT
Start: 2019-02-05 | End: 2019-02-06 | Stop reason: HOSPADM

## 2019-02-05 RX ORDER — LIDOCAINE HYDROCHLORIDE 20 MG/ML
INJECTION, SOLUTION EPIDURAL; INFILTRATION; INTRACAUDAL; PERINEURAL AS NEEDED
Status: DISCONTINUED | OUTPATIENT
Start: 2019-02-05 | End: 2019-02-05 | Stop reason: HOSPADM

## 2019-02-05 RX ORDER — ENOXAPARIN SODIUM 100 MG/ML
40 INJECTION SUBCUTANEOUS ONCE
Status: COMPLETED | OUTPATIENT
Start: 2019-02-05 | End: 2019-02-05

## 2019-02-05 RX ORDER — DEXTROSE 50 % IN WATER (D50W) INTRAVENOUS SYRINGE
25-50 AS NEEDED
Status: CANCELLED | OUTPATIENT
Start: 2019-02-05

## 2019-02-05 RX ORDER — ONDANSETRON 2 MG/ML
4 INJECTION INTRAMUSCULAR; INTRAVENOUS
Status: DISCONTINUED | OUTPATIENT
Start: 2019-02-05 | End: 2019-02-06 | Stop reason: HOSPADM

## 2019-02-05 RX ORDER — MORPHINE SULFATE 10 MG/ML
6 INJECTION, SOLUTION INTRAMUSCULAR; INTRAVENOUS
Status: DISCONTINUED | OUTPATIENT
Start: 2019-02-05 | End: 2019-02-06

## 2019-02-05 RX ORDER — HYDROMORPHONE HYDROCHLORIDE 2 MG/ML
0.5 INJECTION, SOLUTION INTRAMUSCULAR; INTRAVENOUS; SUBCUTANEOUS
Status: DISCONTINUED | OUTPATIENT
Start: 2019-02-05 | End: 2019-02-05 | Stop reason: HOSPADM

## 2019-02-05 RX ORDER — PROPOFOL 10 MG/ML
INJECTION, EMULSION INTRAVENOUS AS NEEDED
Status: DISCONTINUED | OUTPATIENT
Start: 2019-02-05 | End: 2019-02-05 | Stop reason: HOSPADM

## 2019-02-05 RX ORDER — BUPIVACAINE HYDROCHLORIDE AND EPINEPHRINE 5; 5 MG/ML; UG/ML
INJECTION, SOLUTION EPIDURAL; INTRACAUDAL; PERINEURAL AS NEEDED
Status: DISCONTINUED | OUTPATIENT
Start: 2019-02-05 | End: 2019-02-05 | Stop reason: HOSPADM

## 2019-02-05 RX ORDER — DEXMEDETOMIDINE HYDROCHLORIDE 4 UG/ML
INJECTION, SOLUTION INTRAVENOUS AS NEEDED
Status: DISCONTINUED | OUTPATIENT
Start: 2019-02-05 | End: 2019-02-05 | Stop reason: HOSPADM

## 2019-02-05 RX ORDER — NALOXONE HYDROCHLORIDE 0.4 MG/ML
0.4 INJECTION, SOLUTION INTRAMUSCULAR; INTRAVENOUS; SUBCUTANEOUS AS NEEDED
Status: DISCONTINUED | OUTPATIENT
Start: 2019-02-05 | End: 2019-02-06 | Stop reason: HOSPADM

## 2019-02-05 RX ORDER — ROCURONIUM BROMIDE 10 MG/ML
INJECTION, SOLUTION INTRAVENOUS AS NEEDED
Status: DISCONTINUED | OUTPATIENT
Start: 2019-02-05 | End: 2019-02-05 | Stop reason: HOSPADM

## 2019-02-05 RX ORDER — GLYCOPYRROLATE 0.2 MG/ML
INJECTION INTRAMUSCULAR; INTRAVENOUS AS NEEDED
Status: DISCONTINUED | OUTPATIENT
Start: 2019-02-05 | End: 2019-02-05 | Stop reason: HOSPADM

## 2019-02-05 RX ORDER — ACETAMINOPHEN 10 MG/ML
1000 INJECTION, SOLUTION INTRAVENOUS EVERY 6 HOURS
Status: COMPLETED | OUTPATIENT
Start: 2019-02-05 | End: 2019-02-06

## 2019-02-05 RX ADMIN — ROCURONIUM BROMIDE 10 MG: 10 INJECTION, SOLUTION INTRAVENOUS at 15:42

## 2019-02-05 RX ADMIN — ONDANSETRON 4 MG: 2 INJECTION INTRAMUSCULAR; INTRAVENOUS at 15:49

## 2019-02-05 RX ADMIN — PROPOFOL 150 MG: 10 INJECTION, EMULSION INTRAVENOUS at 14:40

## 2019-02-05 RX ADMIN — Medication 5 MG: at 16:09

## 2019-02-05 RX ADMIN — KETOROLAC TROMETHAMINE 15 MG: 30 INJECTION, SOLUTION INTRAMUSCULAR at 20:09

## 2019-02-05 RX ADMIN — LIDOCAINE HYDROCHLORIDE 100 MG: 20 INJECTION, SOLUTION EPIDURAL; INFILTRATION; INTRACAUDAL; PERINEURAL at 14:39

## 2019-02-05 RX ADMIN — CEFAZOLIN SODIUM 3 G: 10 INJECTION, POWDER, FOR SOLUTION INTRAVENOUS at 14:48

## 2019-02-05 RX ADMIN — DEXMEDETOMIDINE HYDROCHLORIDE 8 MCG: 4 INJECTION, SOLUTION INTRAVENOUS at 14:58

## 2019-02-05 RX ADMIN — ACETAMINOPHEN 1000 MG: 10 INJECTION, SOLUTION INTRAVENOUS at 20:09

## 2019-02-05 RX ADMIN — ENOXAPARIN SODIUM 40 MG: 40 INJECTION SUBCUTANEOUS at 11:11

## 2019-02-05 RX ADMIN — HYDROMORPHONE HYDROCHLORIDE 0.5 MG: 2 INJECTION INTRAMUSCULAR; INTRAVENOUS; SUBCUTANEOUS at 16:48

## 2019-02-05 RX ADMIN — SODIUM CHLORIDE, SODIUM LACTATE, POTASSIUM CHLORIDE, AND CALCIUM CHLORIDE: 600; 310; 30; 20 INJECTION, SOLUTION INTRAVENOUS at 15:05

## 2019-02-05 RX ADMIN — DEXMEDETOMIDINE HYDROCHLORIDE 8 MCG: 4 INJECTION, SOLUTION INTRAVENOUS at 15:03

## 2019-02-05 RX ADMIN — SODIUM CHLORIDE, SODIUM LACTATE, POTASSIUM CHLORIDE, AND CALCIUM CHLORIDE 125 ML/HR: 600; 310; 30; 20 INJECTION, SOLUTION INTRAVENOUS at 12:33

## 2019-02-05 RX ADMIN — SODIUM CHLORIDE, SODIUM LACTATE, POTASSIUM CHLORIDE, AND CALCIUM CHLORIDE: 600; 310; 30; 20 INJECTION, SOLUTION INTRAVENOUS at 15:38

## 2019-02-05 RX ADMIN — SODIUM CHLORIDE, SODIUM LACTATE, POTASSIUM CHLORIDE, AND CALCIUM CHLORIDE 150 ML/HR: 600; 310; 30; 20 INJECTION, SOLUTION INTRAVENOUS at 20:09

## 2019-02-05 RX ADMIN — HYDROMORPHONE HYDROCHLORIDE 0.5 MG: 2 INJECTION INTRAMUSCULAR; INTRAVENOUS; SUBCUTANEOUS at 17:24

## 2019-02-05 RX ADMIN — DEXMEDETOMIDINE HYDROCHLORIDE 16 MCG: 4 INJECTION, SOLUTION INTRAVENOUS at 15:42

## 2019-02-05 RX ADMIN — FENTANYL CITRATE 100 MCG: 50 INJECTION, SOLUTION INTRAMUSCULAR; INTRAVENOUS at 14:39

## 2019-02-05 RX ADMIN — ROCURONIUM BROMIDE 20 MG: 10 INJECTION, SOLUTION INTRAVENOUS at 14:58

## 2019-02-05 RX ADMIN — ENOXAPARIN SODIUM 40 MG: 40 INJECTION SUBCUTANEOUS at 22:04

## 2019-02-05 RX ADMIN — ROCURONIUM BROMIDE 50 MG: 10 INJECTION, SOLUTION INTRAVENOUS at 14:40

## 2019-02-05 RX ADMIN — KETAMINE HYDROCHLORIDE 50 MG: 10 INJECTION, SOLUTION INTRAMUSCULAR; INTRAVENOUS at 14:54

## 2019-02-05 RX ADMIN — DEXMEDETOMIDINE HYDROCHLORIDE 8 MCG: 4 INJECTION, SOLUTION INTRAVENOUS at 15:24

## 2019-02-05 RX ADMIN — GLYCOPYRROLATE 1 MG: 0.2 INJECTION INTRAMUSCULAR; INTRAVENOUS at 16:09

## 2019-02-05 RX ADMIN — MORPHINE SULFATE 6 MG: 10 INJECTION, SOLUTION INTRAMUSCULAR; INTRAVENOUS at 20:06

## 2019-02-05 RX ADMIN — KETAMINE HYDROCHLORIDE 50 MG: 10 INJECTION, SOLUTION INTRAMUSCULAR; INTRAVENOUS at 14:44

## 2019-02-05 RX ADMIN — CEFAZOLIN SODIUM 3 G: 10 INJECTION, POWDER, FOR SOLUTION INTRAVENOUS at 22:04

## 2019-02-05 RX ADMIN — PROPOFOL 200 MG: 10 INJECTION, EMULSION INTRAVENOUS at 14:39

## 2019-02-05 RX ADMIN — HYDROMORPHONE HYDROCHLORIDE 0.5 MG: 2 INJECTION INTRAMUSCULAR; INTRAVENOUS; SUBCUTANEOUS at 17:09

## 2019-02-05 RX ADMIN — NYSTATIN 500000 UNITS: 100000 SUSPENSION ORAL at 11:11

## 2019-02-05 RX ADMIN — DEXMEDETOMIDINE HYDROCHLORIDE 8 MCG: 4 INJECTION, SOLUTION INTRAVENOUS at 15:32

## 2019-02-05 RX ADMIN — ACETAMINOPHEN 1000 MG: 10 INJECTION, SOLUTION INTRAVENOUS at 14:34

## 2019-02-05 RX ADMIN — MIDAZOLAM HYDROCHLORIDE 2 MG: 1 INJECTION, SOLUTION INTRAMUSCULAR; INTRAVENOUS at 14:31

## 2019-02-05 RX ADMIN — FAMOTIDINE 20 MG: 10 INJECTION, SOLUTION INTRAVENOUS at 11:22

## 2019-02-05 NOTE — INTERVAL H&P NOTE
H&P Update:  Stevo Pride was seen and examined. History and physical has been reviewed. The patient has been examined.  There have been no significant clinical changes since the completion of the originally dated History and Physical.    Signed By: Alexandra Guido MD     February 5, 2019 2:24 PM

## 2019-02-05 NOTE — PERIOP NOTES
TRANSFER - OUT REPORT:    Verbal report given to [employer] on [employer]  being transferred to [place] for routine post - op       Report consisted of patients Situation, Background, Assessment and   Recommendations(SBAR). Information from the following report(s) OR Summary and MAR was reviewed with the receiving nurse. Opportunity for questions and clarification was provided.       Patient transported with:   O2 @ 3 liters  Registered Nurse  [facility]

## 2019-02-05 NOTE — OP NOTES
OPERATIVE REPORT         Patient:Melo Gonzalez   : 1970  Medical Record [id number]    Pre-operative Diagnosis:  HYPERTENSION, DIABETES, MORBID OBESITY, BMI 47, FATTY LIVER  Post-operative Diagnosis: HYPERTENSION, DIABETES, MORBID OBESITY, BMI 47, FATTY LIVER  Procedure: Procedure(s):  LAPAROSCOPIC GASTRIC SLEEVE (peristrip re-enforced)  WEDGE LIVER BIOPSY  INTRAOPERATIVE ENDOSCOPY WITH BIOPSY  Location: [facility]  Surgeon: Shannon Espinal MD  Assistant:  Saba Swann [facility] - performed retraction of various structures,  assisted in creation of the gastric sleeve, fired stapling devices, obtained hemostasis along staple lines via hemoclips, applied Eviseal,  retrieved all specimens from the abdominal cavity, closed fascial defect, and sutured incisions      Anesthesia: General       Specimens: 1. Gastric Sleeve Resection                       2. Liver Wedge Biopsy    EBL: less than 20 cc  Additional Findings: none             STATEMENT OF MEDICAL NECESSITY: The patient is a 46y.o.-year-old male who has had a history of obesity. he has failed conservative weight loss measures,   such began to consider weight loss surgical options. he chose the   sleeve gastrectomy as a means of surgical weight control. he has undergone   nutritional and psychological teaching at this time period and does wish to proceed   with sleeve gastrectomy. OPERATIVE PROCEDURE: The patient was brought to the operating room, placed   on the table in supine position at which time general  anesthesia was administered   without any difficulty. The abdomen was then prepped and draped in the   usual sterile fashion. Using a 15 blade, a 1 cm incision was made just to the   left of the umbilicus. The veress needle approach was used to gain access to   the peritoneal cavity which was then insufflated.  The Visi-Port was then placed   at that site,then 4 additional trocars were placed in the usual U-shaped configuration with a subxiphoid incision being made to accommodate the   [facility] retractor. On entering the abdomen, the patient was noted to have a   moderate fatty liver with possible evidence of early steatohepatitis. I elevated the liver and noted the patient had no diaphragmatic hernia present. I began the operation by choosing an area 2-3 cm proximal to the pylorus and within the gastroepiploic vessels I began to divide off these vessels individually. I moved cephalad toward short gastric vessels, which were very difficult to take down due to the proximity to the splenic hilum. I was able to do so, clearing the entire left crural area. I then placed a Visigi tube, impacting at the distal antrum. I then began the resection with the powered Ozan   stapler using the black loads and peristrips for the first firing tangential along the   antral region. The second and subsequent firings were used with black  Reloads and peristrips  reaching just past the incisura region. The remainder of the 6 vertical   firings completed the resection at the left crural region. I then tested   the pouch via the Visigi tube using dilute methylene blue,it was noted to be completely   Watertight. I then left the operative field and proceeded to the the head of the bed and performed an intraoperative EGD. The scope was passed successfully into the gastric sleeve to the level of the pylorus. Biopsies were taken of this region and submitted to test both for H Pylori and for pathologic diagnosis. There was no bleeding noted and no leak appreciated with air insufflation. I then returned to the surgical field. I then obtained hemostasis along the staple line using   Hemoclips and sutures where needed.   I then used a single 2-0 Ethibond sutures to secure the lateral aspect of the newly created sleeve stomach to the resected edge of the gastrocolic omentum in an effort to maintain the continuity of the sleeve and prevent twisting. I then used Eviseal along the entire staple line to obtain hemostasis and then place Surgicel Snow over the staple line also. With all this having been completed, I then removed the liver retractor. I performed a liver wedge biopsy of the left lobe of the liver due to its abnormality and submitted to pathology for permanent section. I then placed a ANA drain in the left upper quadrant region along the staple line. I removed the specimen from the operative field via the LLQ incision. I closed the left lower quadrant trocar site using a transabdominal #0 PDS suture along the fascia, and all skin incisions were then closed using 4-0 subcuticular Monocryl. Steri-Strips and sterile dressings were applied. The patient tolerated the procedure well.        Kamaljit Lizarraga M.D.

## 2019-02-05 NOTE — PERIOP NOTES
Spoke to Dr. Fazal Sawyer regarding patient's Kittrell Patter being 90-91. Patient reports this is his baseline. Dr. Fazal Sawyer ok to proceed.

## 2019-02-05 NOTE — PERIOP NOTES
Pt will be going to room 315. Pt family updated. [id number] made aware that SBAR is ready. Janelle LIN will be receiving the pt.

## 2019-02-05 NOTE — ANESTHESIA PREPROCEDURE EVALUATION
Anesthetic History No history of anesthetic complications Review of Systems / Medical History Patient summary reviewed, nursing notes reviewed and pertinent labs reviewed Pulmonary Sleep apnea Neuro/Psych CVA 
TIA and psychiatric history Cardiovascular Hypertension CAD Exercise tolerance: >4 METS 
  
GI/Hepatic/Renal 
  
GERD Endo/Other Diabetes Morbid obesity and arthritis Other Findings Physical Exam 
 
Airway Mallampati: IV 
TM Distance: 4 - 6 cm Neck ROM: decreased range of motion Mouth opening: Diminished (comment) Cardiovascular Regular rate and rhythm,  S1 and S2 normal,  no murmur, click, rub, or gallop Rhythm: regular Rate: normal 
 
 
 
 Dental 
No notable dental hx Pulmonary Breath sounds clear to auscultation Abdominal 
GI exam deferred Other Findings Anesthetic Plan ASA: 4 Anesthesia type: general 
 
 
 
 
Induction: Intravenous Anesthetic plan and risks discussed with: Patient and Spouse

## 2019-02-05 NOTE — INTERVAL H&P NOTE
H&P Update:  Homar John was seen and examined. History and physical has been reviewed. The patient has been examined.  There have been no significant clinical changes since the completion of the originally dated History and Physical.    Signed By: Nilo Marrufo MD     February 5, 2019 2:23 PM

## 2019-02-06 ENCOUNTER — APPOINTMENT (OUTPATIENT)
Dept: GENERAL RADIOLOGY | Age: 49
DRG: 621 | End: 2019-02-06
Attending: SPECIALIST
Payer: COMMERCIAL

## 2019-02-06 ENCOUNTER — NURSE NAVIGATOR (OUTPATIENT)
Dept: SURGERY | Age: 49
End: 2019-02-06

## 2019-02-06 VITALS
OXYGEN SATURATION: 95 % | WEIGHT: 308.9 LBS | BODY MASS INDEX: 45.75 KG/M2 | HEART RATE: 82 BPM | DIASTOLIC BLOOD PRESSURE: 81 MMHG | RESPIRATION RATE: 16 BRPM | SYSTOLIC BLOOD PRESSURE: 131 MMHG | TEMPERATURE: 98.4 F | HEIGHT: 69 IN

## 2019-02-06 LAB
GLUCOSE BLD STRIP.AUTO-MCNC: 95 MG/DL (ref 70–110)
GLUCOSE BLD STRIP.AUTO-MCNC: 97 MG/DL (ref 70–110)

## 2019-02-06 PROCEDURE — 74011250636 HC RX REV CODE- 250/636: Performed by: SPECIALIST

## 2019-02-06 PROCEDURE — C9113 INJ PANTOPRAZOLE SODIUM, VIA: HCPCS | Performed by: SPECIALIST

## 2019-02-06 PROCEDURE — 74240 X-RAY XM UPR GI TRC 1CNTRST: CPT

## 2019-02-06 PROCEDURE — 74011636320 HC RX REV CODE- 636/320: Performed by: SPECIALIST

## 2019-02-06 PROCEDURE — 77010033678 HC OXYGEN DAILY

## 2019-02-06 PROCEDURE — 74011250637 HC RX REV CODE- 250/637: Performed by: NURSE PRACTITIONER

## 2019-02-06 PROCEDURE — 93005 ELECTROCARDIOGRAM TRACING: CPT

## 2019-02-06 PROCEDURE — 82962 GLUCOSE BLOOD TEST: CPT

## 2019-02-06 RX ORDER — OXYCODONE AND ACETAMINOPHEN 5; 325 MG/1; MG/1
2 TABLET ORAL
Status: DISCONTINUED | OUTPATIENT
Start: 2019-02-06 | End: 2019-02-06 | Stop reason: HOSPADM

## 2019-02-06 RX ADMIN — ACETAMINOPHEN 1000 MG: 10 INJECTION, SOLUTION INTRAVENOUS at 09:20

## 2019-02-06 RX ADMIN — MORPHINE SULFATE 6 MG: 10 INJECTION, SOLUTION INTRAMUSCULAR; INTRAVENOUS at 05:05

## 2019-02-06 RX ADMIN — ACETAMINOPHEN 1000 MG: 10 INJECTION, SOLUTION INTRAVENOUS at 02:01

## 2019-02-06 RX ADMIN — CEFAZOLIN SODIUM 3 G: 10 INJECTION, POWDER, FOR SOLUTION INTRAVENOUS at 05:10

## 2019-02-06 RX ADMIN — KETOROLAC TROMETHAMINE 15 MG: 30 INJECTION, SOLUTION INTRAMUSCULAR at 09:21

## 2019-02-06 RX ADMIN — KETOROLAC TROMETHAMINE 15 MG: 30 INJECTION, SOLUTION INTRAMUSCULAR at 02:01

## 2019-02-06 RX ADMIN — IOHEXOL 50 ML: 240 INJECTION, SOLUTION INTRATHECAL; INTRAVASCULAR; INTRAVENOUS; ORAL at 08:02

## 2019-02-06 RX ADMIN — KETOROLAC TROMETHAMINE 15 MG: 30 INJECTION, SOLUTION INTRAMUSCULAR at 14:10

## 2019-02-06 RX ADMIN — PANTOPRAZOLE SODIUM 40 MG: 40 INJECTION, POWDER, FOR SOLUTION INTRAVENOUS at 09:21

## 2019-02-06 RX ADMIN — OXYCODONE AND ACETAMINOPHEN 2 TABLET: 5; 325 TABLET ORAL at 14:14

## 2019-02-06 RX ADMIN — ENOXAPARIN SODIUM 40 MG: 40 INJECTION SUBCUTANEOUS at 14:10

## 2019-02-06 RX ADMIN — MORPHINE SULFATE 6 MG: 10 INJECTION, SOLUTION INTRAMUSCULAR; INTRAVENOUS at 09:26

## 2019-02-06 RX ADMIN — SODIUM CHLORIDE, SODIUM LACTATE, POTASSIUM CHLORIDE, AND CALCIUM CHLORIDE 150 ML/HR: 600; 310; 30; 20 INJECTION, SOLUTION INTRAVENOUS at 02:01

## 2019-02-06 NOTE — DISCHARGE SUMMARY
Discharge Summary    Patient: Stevo Pride               Sex: male          DOA: 2/5/2019         YOB: 1970      Age:  46 y.o.        LOS:  LOS: 1 day                Admit Date: 2/5/2019    Discharge Date: 2/6/2019    Admission Diagnoses:  Morbid obesity with BMI of 45.0-49.9, adult ([facility] [E66.01, Z68.42]    Discharge Diagnoses:    Problem List as of 2/6/2019 Date Reviewed: 2/5/2019          Codes Class Noted - Resolved    Arthritis ICD-10-CM: M19.90  ICD-9-CM: 716.90  Unknown - Present        Angina at rest [facility]) ICD-10-CM: I20.8  ICD-9-CM: 413.9  Unknown - Present        Cerebral vasculitis ICD-10-CM: I67.7  ICD-9-CM: 446.5  Unknown - Present    Overview Signed 1/7/2019 12:40 PM by Kely Olvieira NP     x 2             Obesity, morbid ([facility] ICD-10-CM: E66.01  ICD-9-CM: 278.01  1/15/2018 - Present        Morbid obesity ([facility] ICD-10-CM: E66.01  ICD-9-CM: 278.01  Unknown - Present        * (Principal) Morbid obesity with BMI of 45.0-49.9, adult ([facility] ICD-10-CM: E66.01, Z68.42  ICD-9-CM: 278.01, V85.42  Unknown - Present        Low testosterone ICD-10-CM: R79.89  ICD-9-CM: 790.99  Unknown - Present        Bipolar 1 disorder ([facility] ICD-10-CM: F31.9  ICD-9-CM: 296.7  Unknown - Present        GERD (gastroesophageal reflux disease) ICD-10-CM: K21.9  ICD-9-CM: 530.81  Unknown - Present        Hypertension ICD-10-CM: I10  ICD-9-CM: 401.9  Unknown - Present        Tremors of nervous system ICD-10-CM: R25.1  ICD-9-CM: 781.0  Unknown - Present        Hypercholesteremia ICD-10-CM: E78.00  ICD-9-CM: 272.0  Unknown - Present        Arthritis of both knees ICD-10-CM: M17.0  ICD-9-CM: 716.96  Unknown - Present        Arthritis of spine ICD-10-CM: M47.819  ICD-9-CM: 721.90  Unknown - Present        Arthritis of both hands ICD-10-CM: M19.041, M19.042  ICD-9-CM: 716.94  Unknown - Present        Migraines ICD-10-CM: V93.900  ICD-9-CM: 346.90  Unknown - Present        Diabetes ([facility] ICD-10-CM: E11.9  ICD-9-CM: 250.00 Unknown - Present        Sleep apnea ICD-10-CM: G47.30  ICD-9-CM: 780.57  Unknown - Present        Hx of cardiomyopathy ICD-10-CM: Z86.79  ICD-9-CM: V12.59  Unknown - Present    Overview Signed 1/15/2018  4:29 PM by Bob Fenrandez MD     ? reason for diagnosis 10 years ago             Diastasis recti ICD-10-CM: M62.08  ICD-9-CM: 728.84  Unknown - Present        Type 2 diabetes mellitus with diabetic neuropathy ([facility] ICD-10-CM: E11.40  ICD-9-CM: 250.60, 357.2  1/15/2018 - Present        CVA (cerebral vascular accident) [facility]) ICD-10-CM: I63.9  ICD-9-CM: 434.91  Unknown - Present    Overview Signed 1/15/2018  8:03 PM by Bob Fernandez MD     2016- due to vasculitis             Bipolar affective disorder, mixed, mild ([facility] ICD-10-CM: F31.61  ICD-9-CM: 296.61  7/7/2017 - Present        Alcohol dependence in remission [facility]) ICD-10-CM: O13.33  ICD-9-CM: 303.93  7/7/2017 - Present              Discharge Condition: Good    Hospital Course: The patient underwent  laparoscopic sleeve gastrectomy  on 2/5/2019. The patient tolerated the procedure well. Vital signs remained stable and the patient was transferred to  3rd floor surgical unit without complications. The patient remained stable throughout the first night post operatively with stable vital signs and adequate urine output and pain control. Pain was controlled with Dilaudid IV and IV Tylenol . The patient on the first morning post operative was transferred to the radiology suite where they underwent a gastrograffin UGI study which showed no evidence of a leak or stricture. The drain was discontinued on POD # 1 and the patient was started on a bariatric liquid diet with protein shakes. The patient progressed throughout the day and was ambulating well and tolerating their diet. They were therefore discharged home with instructions to notify me with any issues that may arise.     Significant Diagnostic Studies:   Recent Labs     02/05/19  2036   HGB 13.0 Recent Labs     02/05/19 2036   HCT 42.8       Current Discharge Medication List      CONTINUE these medications which have NOT CHANGED    Details   losartan (COZAAR) 50 mg tablet Take  by mouth daily. primidone (MYSOLINE) 50 mg tablet Take  by mouth nightly. B.infantis-B.ani-B.long-B.bifi (PROBIOTIC 4X) 10-15 mg TbEC Take  by mouth daily. risperiDONE (RISPERDAL) 2 mg tablet Take 1 Tab by mouth nightly. Qty: 30 Tab, Refills: 5      buPROPion XL (WELLBUTRIN XL) 300 mg XL tablet Take 1 Tab by mouth every morning. Qty: 30 Tab, Refills: 5      DULoxetine (CYMBALTA) 60 mg capsule Take 1 Cap by mouth daily. Qty: 30 Cap, Refills: 5      !! gabapentin (NEURONTIN) 600 mg tablet Take 2 Tabs by mouth two (2) times a day. And 1 po at lunch  Qty: 150 Tab, Refills: 5      QUEtiapine (SEROQUEL) 300 mg tablet Take 1 Tab by mouth nightly. Qty: 30 Tab, Refills: 5      traZODone (DESYREL) 50 mg tablet Take 1 Tab by mouth nightly as needed for Sleep. Qty: 30 Tab, Refills: 5      OXcarbaxepine (TRILEPTAL) 600 mg tablet Take 2 Tabs by mouth two (2) times a day. Qty: 120 Tab, Refills: 5      Omeprazole delayed release (PRILOSEC D/R) 20 mg tablet Take 40 mg by mouth two (2) times a day. atorvastatin (LIPITOR) 20 mg tablet Take 40 mg by mouth nightly. !! gabapentin (NEURONTIN) 600 mg tablet Take  by mouth daily (with lunch). onabotulinumtoxinA (BOTOX INJECTION) by IntraMUSCular route every three (3) months. oxyCODONE-acetaminophen (PERCOCET) 5-325 mg per tablet Take 1 Tab by mouth every six (6) hours as needed for Pain. Max Daily Amount: 4 Tabs. Qty: 30 Tab, Refills: 0    Associated Diagnoses: Post-op pain       !! - Potential duplicate medications found. Please discuss with provider.       STOP taking these medications       cetirizine (ZYRTEC) 10 mg tablet Comments:   Reason for Stopping:         HYDROcodone-acetaminophen (NORCO) 5-325 mg per tablet Comments:   Reason for Stopping: LORazepam (ATIVAN) 2 mg tablet Comments:   Reason for Stopping:         metFORMIN (GLUCOPHAGE) 500 mg tablet Comments:   Reason for Stopping:         azaTHIOprine (IMURAN) 50 mg tablet Comments:   Reason for Stopping:         multivitamin (ONE A DAY) tablet Comments:   Reason for Stopping:         ferrous sulfate (IRON) 325 mg (65 mg iron) tablet Comments:   Reason for Stopping:         erenumab-aooe (AIMOVIG AUTOINJECTOR) 70 mg/mL atIn Comments:   Reason for Stopping:         aspirin 81 mg chewable tablet Comments:   Reason for Stopping:         diclofenac potassium (ZIPSOR) 25 mg capsule Comments:   Reason for Stopping:         testosterone cypionate (DEPOTESTOTERONE CYPIONATE) 200 mg/mL injection Comments:   Reason for Stopping:               Activity: activity as tolerated with no heavy lifting of greater than 20 pounds. No anti- inflammatory medications. Use stool softeners at home as needed while taking pain medications since they are constipating. Diet: Bariatric liquid diet    Wound Care: Keep wound clean and dry, Reinforce dressing PRN and ice to area for comfort. Do not get wound wet for 2 days.     Follow-up: 14 days with Dr Masoud Sanchez M.D

## 2019-02-06 NOTE — ROUTINE PROCESS
Pt alert and oriented. Pain of 8/10, medicated with 6 mg morphine. Up to ambulate in the hallway with minimal assistance with wife.      Visit Vitals  /73   Pulse 87   Temp 98.1 °F (36.7 °C)   Resp 16   Ht 5' 9\" (1.753 m)   Wt 137.7 kg (303 lb 9 oz)   SpO2 96%   BMI 44.83 kg/m²

## 2019-02-06 NOTE — PROGRESS NOTES
Discharge instructions reviewed with the patient. Patient verbalized understanding. All questions answered. IV discontinued, no redness, swelling or pain noted. Patient discharged off the unit via wheelchair with [facility] transport. Patient armband removed and shredded.

## 2019-02-06 NOTE — PROGRESS NOTES
Transition of Care [facility]) Plan:   physician follow up 2/6/19      Chart reviewed. Pt admitted for an elective surgical procedure (laparoscopic sleeve gastrectomy). Pt is independent. Please encourage ambulation. No transition of care needs identified at this time. Anticipate pt will be medically stable for discharge within the next 24-48 hours. CM available to assist as needed. 1130:  CM met with pt and his wife at bedside to discuss transition of care. Pt is independent and his wife will assist as needed. No transition of care needs have been identified. [facility] Transportation:   How is patient being transported at discharge? Family/Friend      When? Once cleared by physician     Is transport scheduled? N/A      Follow-up appointment and transportation:   PCP/Specialist?  See AVS for Appointment         Who is transporting to the follow-up appointment? Self/Family/Friend      Is transport for follow up appointment scheduled? N/A    Communication plan (with patient/family): Who is being called? Patient or Next of Kin? Responsible party? Patient      What number(s) is to be used? See Facesheet      What service provider is calling for [address] services? When are they calling? Readmission Risk? (Green/Low; Yellow/Moderate; Red/High):  Green      Care Management Interventions  Mode of Transport at Discharge:  Other (see comment)(spouse)  Transition of Care Consult (CM Consult): Discharge Planning  Health Maintenance Reviewed: Yes  Current Support Network: Lives with Spouse  Confirm Follow Up Transport: Self  Discharge Location  Discharge Placement: Home with family assistance

## 2019-02-06 NOTE — ROUTINE PROCESS
Bedside shift change report given to Armando Rao RN (oncoming nurse) by Juan Antonio Sandoval RN (offgoing nurse). Report included the following information SBAR, Kardex, Intake/Output, MAR, Recent Results and Alarm Parameters .

## 2019-02-06 NOTE — PROGRESS NOTES
Bariatric Surgery                POD #1    Visit Vitals  /81 (BP 1 Location: Right arm, BP Patient Position: Sitting)   Pulse 82   Temp 98.4 °F (36.9 °C)   Resp 16   Ht 5' 9\" (1.753 m)   Wt 140.1 kg (308 lb 14.4 oz)   SpO2 95%   BMI 45.62 kg/m²     Patient has minimal complaints of pain, minimal nausea noted     Exam:  Appears well in no distress  Lungs- clear bilaterally  Abd - soft, incisions look good without erythema           ANA with minimal serosanguinous output  Extremities- no new edema or swelling    UGI - no obstrustion or leak    Data Review:    Labs: Results:       Chemistry Recent Labs     02/05/19 2036   GLU 98      K 4.6      CO2 26   BUN 13   CREA 0.80   CA 8.4*   AGAP 10   BUCR 16   AP 50   TP 6.6   ALB 3.7   GLOB 2.9   AGRAT 1.3      CBC w/Diff Recent Labs     02/05/19 2036   WBC 12.5   RBC 5.53*   HGB 13.0   HCT 42.8      GRANS 80*   LYMPH 11*   EOS 1      Coagulation No results for input(s): PTP, INR, APTT in the last 72 hours. No lab exists for component: INREXT    Liver Enzymes Recent Labs     02/05/19 2036   TP 6.6   ALB 3.7   AP 50   SGOT 32          Assessment/Plan: S/P  laparoscopic sleeve gastrectomy - doing well without any issues    1.  Agree with D/C later today

## 2019-02-06 NOTE — ROUTINE PROCESS
Bedside shift change report given to ANTONIO Pryor RN (oncoming nurse) by KITA Argueta (offgoing nurse). Report included the following information SBAR, Kardex, Intake/Output and MAR.

## 2019-02-06 NOTE — PROGRESS NOTES
Pt care received. Pt A/O x4. Pt resting in bed. Denies pain. Pt stable. Call light within reach, bed in lowest position and locked. 1143: Drain removed. 0ccs noted. No trauma or pain noted.

## 2019-02-06 NOTE — DISCHARGE INSTRUCTIONS
[facility]  [facility]. Paul Martinez M.D., F.A.C.S.  [address]. [address] [place] [address] Rd, [address] [facility]  Office: [phone]    Fax:  [phone]    Discharge Instruction for Gastric Bypass / Sleeve Gastrectomy Patients    Diet:   Continue with the liquid diet until you are seen in the office. Make sure you sip fluids all day. Aim for  Gms of protein every day. Activity:   Walking is encouraged. Rest when you are tired.  You may shower.  You may climb stairs. Take your time.  No lifting more than 10-15 lbs.  If you feel discomfort during an activity, rest.   Do not drive for 1 week. Wound Care:   Clean incisions with soap and water when in the shower. Pat dry.  Leave steri-strips on until they fall off.  A small amount of drainage may be present from the incisions. Contact the office if you notice an increase in drainage, an odor, increased redness, or fever > 100.5. Medications:   You will receive a prescription for pain medication at the time of discharge.  For upset stomach you may take over the counter medications such as Maalox, Mylanta, Pepcid, Zantac, or Tagamet.  You may take Tylenol   Non-aspirin based arthritis medications may be resumed after the first month.  Take a childrens or adult chewable multivitamin.  Milk of Magnesia will help with constipation.  It is fine to take your usual home medications. Blood pressure medications should be continued after surgery. Diabetic medications can frequently be reduced very quickly after surgery. Diabetics should continue to monitor blood sugars frequently after surgery and contact the prescribing physician for any questions. Follow-Up Appointment:   If you do not already have a follow-up appointment scheduled, contact the office in the next few days to obtain one. It is usually scheduled for 10-14 days after you surgery date. Office phone number:  [phone].         REV  09/2010

## 2019-02-06 NOTE — PROGRESS NOTES
Bariatric Surgery                POD #1    Visit Vitals  /70 (BP 1 Location: Right arm, BP Patient Position: At rest)   Pulse 85   Temp 99.5 °F (37.5 °C)   Resp 16   Ht 5' 9\" (1.753 m)   Wt 140.1 kg (308 lb 14.4 oz)   SpO2 95%   BMI 45.62 kg/m²     Patient has minimal complaints of pain, minimal nausea noted     Exam:  Appears well in no distress  Lungs- clear bilaterally  Abd - soft, incisions look good without erythema           ANA with minimal serosanguinous output  Extremities- no new edema or swelling    UGI - pending    Data Review:    Labs: Results:       Chemistry Recent Labs     02/05/19 2036   GLU 98      K 4.6      CO2 26   BUN 13   CREA 0.80   CA 8.4*   AGAP 10   BUCR 16   AP 50   TP 6.6   ALB 3.7   GLOB 2.9   AGRAT 1.3      CBC w/Diff Recent Labs     02/05/19 2036   WBC 12.5   RBC 5.53*   HGB 13.0   HCT 42.8      GRANS 80*   LYMPH 11*   EOS 1      Coagulation No results for input(s): PTP, INR, APTT in the last 72 hours. No lab exists for component: INREXT    Liver Enzymes Recent Labs     02/05/19 2036   TP 6.6   ALB 3.7   AP 50   SGOT 32          Assessment/Plan: S/P  laparoscopic sleeve gastrectomy - doing well without any issues    Orders are pending until UGI study shows normal anatomy. 1.Start bariatric diet and protein shakes  2. D/C IV pain meds and start PO pain meds  3. D/C ANA drain  4. Likely PM D/C if continues to be okay and tolerates PO

## 2019-02-06 NOTE — PROGRESS NOTES
2350- Pt c/o of gas pain. Instructed patient to walk as many times as he can to help rid the gas. Pt has walked only once thus far. Pt sitting at the side of the bed watching television. 0115- Pt ambulating in hallway with spouse. 0215- Pt taken off oxygen. O2 95% on room. Pt encouraged to continue using his I/S and walking in the hallway to maintain O2 at 95% or better. Shift Summary- CHG wipe down completed his am. Pt ambulated in the hallway several times. J/P CDI, charged and patent with serosanguinous drainage. Lap sites with scant breakthrough bleeding. Pt is VERY sensitive morphine.      Patient Vitals for the past 12 hrs:   Temp Pulse Resp BP SpO2   02/06/19 0358 99.3 °F (37.4 °C) 93 18 139/75 91 %   02/05/19 2321 99 °F (37.2 °C) 89 18 133/64 92 %   02/05/19 2120 98.5 °F (36.9 °C) 81 16 136/82 95 %   02/05/19 2020 98.6 °F (37 °C) 92 16 131/73 92 %   02/05/19 1917 98.1 °F (36.7 °C) 87 16 131/73 --

## 2019-02-07 ENCOUNTER — TELEPHONE (OUTPATIENT)
Dept: SURGERY | Age: 49
End: 2019-02-07

## 2019-02-07 NOTE — ANESTHESIA POSTPROCEDURE EVALUATION
Procedure(s): LAPAROSCOPIC GASTRIC SLEEVE, WEDGE LIVER BIOPSY AND INTRAOPERATIVE ENDOSCOPY WITH BIOPSY. Anesthesia Post Evaluation Comments: Post-Anesthesia Evaluation and Assessment Cardiovascular Function/Vital Signs /81 (BP 1 Location: Right arm, BP Patient Position: Sitting)   Pulse 82   Temp 36.9 °C (98.4 °F)   Resp 16   Ht 5' 9\" (1.753 m)   Wt 140.1 kg (308 lb 14.4 oz)   SpO2 95%   BMI 45.62 kg/m² Patient is status post Procedure(s): LAPAROSCOPIC GASTRIC SLEEVE, WEDGE LIVER BIOPSY AND INTRAOPERATIVE ENDOSCOPY WITH BIOPSY. Nausea/Vomiting: Controlled. Postoperative hydration reviewed and adequate. Pain: 
Pain Scale 1: Numeric (0 - 10) (02/06/19 1414) Pain Intensity 1: 8 (02/06/19 1414) Managed. Neurological Status:  
Neuro (WDL): Exceptions to WDL (02/05/19 1626) At baseline. Mental Status and Level of Consciousness: Arousable. Pulmonary Status:  
O2 Device: Room air (02/06/19 0926) Adequate oxygenation and airway patent. Complications related to anesthesia: None Post-anesthesia assessment completed. No concerns. Patient has met all discharge requirements. Signed By: Ileana Dejesus MD  
 February 6, 2019 Visit Vitals /81 (BP 1 Location: Right arm, BP Patient Position: Sitting) Pulse 82 Temp 36.9 °C (98.4 °F) Resp 16 Ht 5' 9\" (1.753 m) Wt 140.1 kg (308 lb 14.4 oz) SpO2 95% BMI 45.62 kg/m²

## 2019-02-07 NOTE — TELEPHONE ENCOUNTER
This RN Bariatric Coordinator spoke with patient at 24 hour post-discharge. He is \"feeling great! \"  He is walking around his house throughout the day and is using the incentive spirometer. He is tolerating sips of water and prefers vanilla protein shakes. Patient denied nausea and/or vomiting. He has not had a BM to date, and this RN reminded him of over the counter Miralax to assist, in which he verbalized understanding. He rated his pain as a four to five, achy, and to his abdomen; however, the Percocet is effective at managing his pain. He is monitoring his temperatures daily; 99 degrees was his most recent temperature. His wife inspects his incision sites, and they are without redness, drainage, and/or swelling per patient. This RN reminded patient to call the office if any change. Two week post-op visit already scheduled.

## 2019-02-14 NOTE — PROGRESS NOTES
Pre-op Class:    Patient attended bariatric surgery pre-operative education class today. He appeared to have an understanding of the surgical procedure, pre-op and post-op risks, and lifestyle changes. He asked appropriate questions, and all questions were answered in their entirety.

## 2019-02-15 ENCOUNTER — TELEPHONE (OUTPATIENT)
Dept: SURGERY | Age: 49
End: 2019-02-15

## 2019-02-15 NOTE — TELEPHONE ENCOUNTER
Patient contacted this RN, as he is \"fatigued. \"  He has been drinking a lot of protein. He stated that is mouth is \"not really dry. \"  This RN educated him on the importance of hydration with water and electrolytes, in which he stated he has the [employer] Electrolyte to put in his water. He verbalized understanding to drinking more water and supplementing with protein until his two week post-op follow-up appointment this Wednesday. This RN also spoke with him about the process of ketosis, and he verbalized understanding. Patient will contact the office with any future concerns or questions.

## 2019-02-20 ENCOUNTER — OFFICE VISIT (OUTPATIENT)
Dept: SURGERY | Age: 49
End: 2019-02-20

## 2019-02-20 VITALS
SYSTOLIC BLOOD PRESSURE: 97 MMHG | DIASTOLIC BLOOD PRESSURE: 68 MMHG | HEIGHT: 69 IN | WEIGHT: 283.4 LBS | BODY MASS INDEX: 41.98 KG/M2 | OXYGEN SATURATION: 100 % | TEMPERATURE: 97.6 F | HEART RATE: 86 BPM

## 2019-02-20 DIAGNOSIS — K90.9 INTESTINAL MALABSORPTION, UNSPECIFIED TYPE: Primary | ICD-10-CM

## 2019-02-20 DIAGNOSIS — Z98.84 S/P LAPAROSCOPIC SLEEVE GASTRECTOMY: ICD-10-CM

## 2019-02-20 NOTE — PROGRESS NOTES
Subjective: Arpit Vyas is a 46 y.o. male is now 2 weeks status post laparoscopic sleeve gastrectomy. Doing well overall. He has lost a total of 36 pounds since surgery. Body mass index is 41.85 kg/m². Currently on a liquid diet without difficulty. Taking in 80 oz water daily. Sources of protein include protein shakes. 15-20  min of activity 7 days a week, including walking. Does report ketotic symptoms and weakness. Bowel movements are alternating constipation and regularity. The patient is not having any pain related to surgery. Having lots of arthritis pain and asking to resume diclofenac and Imuran. The patient is compliant with multivitamins. Surgery related complications: NA.  Liver bx report reviewed with patient. Stomach bx report reviewed with patient. Weight Loss Metrics 2/20/2019 2/5/2019 2/1/2019 1/31/2019 1/28/2019 1/7/2019 11/13/2018 Today's Wt 283 lb 6.4 oz 308 lb 14.4 oz 318 lb 3.2 oz 319 lb 319 lb 3.2 oz 320 lb 1.6 oz 324 lb BMI 41.85 kg/m2 45.62 kg/m2 46.99 kg/m2 47.11 kg/m2 47.14 kg/m2 47.27 kg/m2 47.85 kg/m2 Comorbidities: Hypertension: improved, on RX, denies lightheadedness of dizziness Diabetes: improved, no medications Obstructive Sleep Apnea: unchanged, using CPAP Hyperlipidemia: unchanged, on statin Stress Urinary Incontinence: not applicable Gastroesophageal Reflux: improved, on PPI Weight related arthropathy:worsened, knees Patient Active Problem List  
Diagnosis Code  Bipolar affective disorder, mixed, mild ([facility]) F31.61  
 Alcohol dependence in remission ([facility] F10.21  
 Obesity, morbid ([facility]) E66.01  
 Morbid obesity ([facility]) E66.01  
 Morbid obesity with BMI of 45.0-49.9, adult ([facility]) E66.01, Z68.42  Low testosterone R79.89  Bipolar 1 disorder ([facility]) F31.9  GERD (gastroesophageal reflux disease) K21.9  Hypertension I10  Tremors of nervous system R25.1  Hypercholesteremia E78.00  Arthritis of both knees M17.0  Arthritis of spine M47.819  Arthritis of both hands M19.041, M19.042  Migraines R37.687  Diabetes ([facility] [address] E11.9  Sleep apnea G47.30  
 Hx of cardiomyopathy Z86.79  
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  
 Intestinal malabsorption K90.9  S/P laparoscopic sleeve gastrectomy Z98.84 Past Medical History:  
Diagnosis Date  Adverse effect of anesthesia   
 slow to awaken  Angina at rest [facility])  Arthritis  Bipolar 1 disorder ([facility]  Cerebral vasculitis   
 x 2  
 Concussion   
 childhood  CVA (cerebral vascular accident) ([facility] 2016- due to vasculitis;october 2018  Diabetes ([facility] 2016  Diastasis recti  Fibromyalgia  GERD (gastroesophageal reflux disease)  Hx of cardiomyopathy   
 ? reason for diagnosis 10 years ago  Hypercholesteremia  Hypertension 2016  Low testosterone  Migraines  Morbid obesity ([facility]  Morbid obesity with BMI of 45.0-49.9, adult ([facility] [facility]  Psychiatric disorder   
 bipolar  Sleep apnea   
 cpap  Tremors of nervous system Past Surgical History:  
Procedure Laterality Date  HX APPENDECTOMY  HX ORTHOPAEDIC    
 CTR both hands  HX ORTHOPAEDIC    
 cervical fusion  HX ORTHOPAEDIC    
 TORN MENISCUS  
 HX TONSILLECTOMY Current Outpatient Medications Medication Sig Dispense Refill  traZODone (DESYREL) 100 mg tablet Take 1 Tab by mouth nightly as needed for Sleep. 30 Tab 2  
 gabapentin (NEURONTIN) 600 mg tablet Take  by mouth daily (with lunch).  losartan (COZAAR) 50 mg tablet Take  by mouth daily.  primidone (MYSOLINE) 50 mg tablet Take  by mouth nightly.  B.infantis-B.ani-B.long-B.bifi (PROBIOTIC 4X) 10-15 mg TbEC Take  by mouth daily.  onabotulinumtoxinA (BOTOX INJECTION) by IntraMUSCular route every three (3) months.  oxyCODONE-acetaminophen (PERCOCET) 5-325 mg per tablet Take 1 Tab by mouth every six (6) hours as needed for Pain. Max Daily Amount: 4 Tabs. 30 Tab 0  
 risperiDONE (RISPERDAL) 2 mg tablet Take 1 Tab by mouth nightly. (Patient taking differently: Take 4 mg by mouth every other day.) 30 Tab 5  
 buPROPion XL (WELLBUTRIN XL) 300 mg XL tablet Take 1 Tab by mouth every morning. (Patient taking differently: Take 300 mg by mouth every morning.) 30 Tab 5  DULoxetine (CYMBALTA) 60 mg capsule Take 1 Cap by mouth daily. 30 Cap 5  
 gabapentin (NEURONTIN) 600 mg tablet Take 2 Tabs by mouth two (2) times a day. And 1 po at lunch (Patient taking differently: Take 1,200 mg by mouth two (2) times a day.) 150 Tab 5  QUEtiapine (SEROQUEL) 300 mg tablet Take 1 Tab by mouth nightly. 30 Tab 5  
 OXcarbaxepine (TRILEPTAL) 600 mg tablet Take 2 Tabs by mouth two (2) times a day. (Patient taking differently: Take 1,200 mg by mouth two (2) times a day.) 120 Tab 5  Omeprazole delayed release (PRILOSEC D/R) 20 mg tablet Take 40 mg by mouth two (2) times a day.  atorvastatin (LIPITOR) 20 mg tablet Take 40 mg by mouth nightly. Allergies Allergen Reactions  Sudafed [Pseudoephedrine Hcl] Other (comments) Pt advised not to take due to stroke in aug '16 Review of Symptoms:  
 
 
General - No history or complaints of unexpected fever or chills Head/Neck - No history or complaints of headache or dizziness Cardiac - No history or complaints of chest pain, palpitations, or shortness of breath Pulmonary - No history or complaints of shortness of breath or productive cough Gastrointestinal - as noted above Genitourinary - No history or complaints of hematuria/dysuria or renal lithiasis Musculoskeletal - No history or complaints of joint  muscular weakness Hematologic - No history of any bleeding episodes Neurologic - No history or complaints of  migraine headaches or neurologic symptoms Objective:  
 
Visit Vitals BP 97/68 (BP 1 Location: Left arm, BP Patient Position: Sitting) Pulse 86 Temp 97.6 °F (36.4 °C) Ht 5' 9\" (1.753 m) Wt 128.5 kg (283 lb 6.4 oz) SpO2 100% BMI 41.85 kg/m² General:  alert, cooperative, no distress, appears stated age Chest: lungs clear to auscultation, breath sounds equal and symmetric, no rhonchi, rales or wheezes, no accessory muscle use Cor:   Regular rate and rhythm, S1S2 present or without murmur or extra heart sounds Abdomen: soft, bowel sounds active, non-tender, no masses or organomegaly Incisions:   healing well, no drainage, no erythema, no hernia, no seroma, no swelling, no dehiscence, incision well approximated; resolving hematoma around umbilical incision, large  diastasis recti Assessment:  
History of Morbid obesity, status post laparoscopic sleeve gastrectomy. Doing well postoperatively. HTN - on Rx, with symptomatic hypotension, decrease cozaar dose and f/u PCP 
DM - off meds, check BS and f/u PCP Arthritis pain - cannot resume NSAID until 6 weeks postop, then can consider Arthrotec, use Voltaren gel for now, chronic pain med and tylenol ADRIEL - on CPAP Plan:  
 
1. Increase activity to the goal of 30 minutes daily 2. Advance diet to soft solid phase. Reminded to measure portions, continue high protein, low carbohydrate diet. Reminded to eat regularly, to eat slowly & not to drink with meals. Refer to the handbook given in class. 3. Continue multivitamin 4. Continue current medications and follow up with PCP for management of regimen. 5. Encouraged to attend support group 6. I have discussed this plan with patient and they verbalized understanding 7.  Follow up in 2 weeks or sooner if patient has questions, concerns or worsening of condition, if unable to reach our office, patient should report to the ED. 8. Mr. Beverley Martinez has a reminder for a \"due or due soon\" health maintenance. I have asked that he contact his primary care provider for a follow-up on this health maintenance.

## 2019-02-20 NOTE — PATIENT INSTRUCTIONS
Continue focus on hydration. May advance to soft diet. Please review material in your binder. Remember - your motto is \"soft foods with protein\". Eat regularly. Continue to use protein shakes. Remember to eat slowly & not to drink fluids with your meals. Increase activity as able - cardio (walking) only. Continue to take multi-vitamins. Continue regular follow-up with your PCP. Return to office in 2 weeks for your next appointment. Call the office if you have any questions or concerns. Keep blood pressure at home and if you start to get lightheaded or your BP readings stay 90/60 or below, cut your cozaar in half. Call PCP for BP check. Walking for Exercise: Care Instructions Your Care Instructions Walking is one of the easiest ways to get the exercise you need for good health. A brisk, 30-minute walk each day can help you feel better and have more energy. It can help you lower your risk of disease. Walking can help you keep your bones strong and your heart healthy. Check with your doctor before you start a walking plan if you have heart problems, other health issues, or you have not been active in a long time. Follow your doctor's instructions for safe levels of exercise. Follow-up care is a key part of your treatment and safety. Be sure to make and go to all appointments, and call your doctor if you are having problems. It's also a good idea to know your test results and keep a list of the medicines you take. How can you care for yourself at home? Getting started · Start slowly and set a short-term goal. For example, walk for 5 or 10 minutes every day. · Bit by bit, increase the amount you walk every day. Try for at least 30 minutes on most days of the week. You also may want to swim, bike, or do other activities. · If finding enough time is a problem, it is fine to be active in blocks of 10 minutes or more throughout your day and week. · To get the heart-healthy benefits of walking, you need to walk briskly enough to increase your heart rate and breathing, but not so fast that you cannot talk comfortably. · Wear comfortable shoes that fit well and provide good support for your feet and ankles. Staying with your plan · After you've made walking a habit, set a longer-term goal. You may want to set a goal of walking briskly for longer or walking farther. Experts say to do 2½ hours of moderate activity a week. A faster heartbeat is what defines moderate-level activity. · To stay motivated, walk with friends, coworkers, or pets. · Use a phone yeimi or pedometer to track your steps each day. Set a goal to increase your steps. Once you get there, set a higher goal. Aim for 10,000 steps a day. · If the weather keeps you from walking outside, go for walks at the mall with a friend. Local schools and churches may have indoor gyms where you can walk. Fitting a walk into your workday · Park several blocks away from work, or get off the bus a few stops early. · Use the stairs instead of the elevator, at least for a few floors. · Suggest holding meetings with colleagues during a walk inside or outside the building. · Use the restroom that is the farthest from your desk or workstation. · Use your morning and afternoon breaks to take quick 15-minute walks. Staying safe · Know your surroundings. Walk in a well-lighted, safe place. If it is dark, walk with a partner. Wear light-colored clothing. If you can, buy a vest or jacket that reflects light. · Carry a cell phone for emergencies. · Drink plenty of water. Take a water bottle with you when you walk. This is very important if it is hot out. · Be careful not to slip on wet or icy ground. You can buy \"grippers\" for your shoes to help keep you from slipping. · Pay attention to your walking surface. Use sidewalks and paths.  
· If you have breathing problems like asthma or COPD, ask your doctor when it is safe for you to walk outdoors. Cold, dry air, smog, pollen, or other things in the air could cause breathing problems. Where can you learn more? Go to http://deepak-zachary.info/. Enter R159 in the search box to learn more about \"Walking for Exercise: Care Instructions. \" Current as of: December 7, 2017 Content Version: 11.8 © 5286-9446 [employer]. Care instructions adapted under license by [employer] (which disclaims liability or warranty for this information). If you have questions about a medical condition or this instruction, always ask your healthcare professional. [address] any warranty or liability for your use of this information.

## 2019-02-28 ENCOUNTER — TELEPHONE (OUTPATIENT)
Dept: SURGERY | Age: 49
End: 2019-02-28

## 2019-03-05 ENCOUNTER — OFFICE VISIT (OUTPATIENT)
Dept: SURGERY | Age: 49
End: 2019-03-05

## 2019-03-05 VITALS
SYSTOLIC BLOOD PRESSURE: 108 MMHG | HEART RATE: 76 BPM | WEIGHT: 280.5 LBS | DIASTOLIC BLOOD PRESSURE: 74 MMHG | BODY MASS INDEX: 41.54 KG/M2 | OXYGEN SATURATION: 100 % | TEMPERATURE: 97.6 F | HEIGHT: 69 IN

## 2019-03-05 DIAGNOSIS — G47.30 SLEEP APNEA, UNSPECIFIED TYPE: ICD-10-CM

## 2019-03-05 DIAGNOSIS — K90.9 INTESTINAL MALABSORPTION, UNSPECIFIED TYPE: Primary | ICD-10-CM

## 2019-03-05 DIAGNOSIS — I10 ESSENTIAL HYPERTENSION: ICD-10-CM

## 2019-03-05 DIAGNOSIS — E11.9 TYPE 2 DIABETES MELLITUS WITHOUT COMPLICATION, WITHOUT LONG-TERM CURRENT USE OF INSULIN (HCC): ICD-10-CM

## 2019-03-05 DIAGNOSIS — Z98.84 S/P LAPAROSCOPIC SLEEVE GASTRECTOMY: ICD-10-CM

## 2019-03-05 DIAGNOSIS — E66.01 MORBID OBESITY WITH BMI OF 45.0-49.9, ADULT (HCC): Primary | ICD-10-CM

## 2019-03-05 RX ORDER — URSODIOL 500 MG/1
500 TABLET, FILM COATED ORAL DAILY
Qty: 30 TAB | Refills: 4 | Status: SHIPPED | OUTPATIENT
Start: 2019-03-05 | End: 2019-04-04

## 2019-03-05 NOTE — PATIENT INSTRUCTIONS
May advance diet to solid phase. Remember to measure portions, to keep the focus on increasing your protein & keeping your carbs low. Continue the use of protein shakes. To follow recommendations of dietician. Stay hydrated! Eat regularly, eat slowly & do not drink with your meals. Vitamins to be taken include your multivitamin, B12, calcium citrate, Vit D & Bcomplex. Refer to your notebook & handouts for dosages. Continue to increase cardio activity. May add resistance exercises. Continue follow-up with your PCP. Return to this office in 1 month. Call if questions/concerns prior to your office visit. Walking for Exercise: Care Instructions  Your Care Instructions    Walking is one of the easiest ways to get the exercise you need for good health. A brisk, 30-minute walk each day can help you feel better and have more energy. It can help you lower your risk of disease. Walking can help you keep your bones strong and your heart healthy. Check with your doctor before you start a walking plan if you have heart problems, other health issues, or you have not been active in a long time. Follow your doctor's instructions for safe levels of exercise. Follow-up care is a key part of your treatment and safety. Be sure to make and go to all appointments, and call your doctor if you are having problems. It's also a good idea to know your test results and keep a list of the medicines you take. How can you care for yourself at home? Getting started  · Start slowly and set a short-term goal. For example, walk for 5 or 10 minutes every day. · Bit by bit, increase the amount you walk every day. Try for at least 30 minutes on most days of the week. You also may want to swim, bike, or do other activities. · If finding enough time is a problem, it is fine to be active in blocks of 10 minutes or more throughout your day and week.   · To get the heart-healthy benefits of walking, you need to walk briskly enough to increase your heart rate and breathing, but not so fast that you cannot talk comfortably. · Wear comfortable shoes that fit well and provide good support for your feet and ankles. Staying with your plan  · After you've made walking a habit, set a longer-term goal. You may want to set a goal of walking briskly for longer or walking farther. Experts say to do 2½ hours of moderate activity a week. A faster heartbeat is what defines moderate-level activity. · To stay motivated, walk with friends, coworkers, or pets. · Use a phone yeimi or pedometer to track your steps each day. Set a goal to increase your steps. Once you get there, set a higher goal. Aim for 10,000 steps a day. · If the weather keeps you from walking outside, go for walks at the mall with a friend. Local schools and churches may have indoor gyms where you can walk. Fitting a walk into your workday  · Park several blocks away from work, or get off the bus a few stops early. · Use the stairs instead of the elevator, at least for a few floors. · Suggest holding meetings with colleagues during a walk inside or outside the building. · Use the restroom that is the farthest from your desk or workstation. · Use your morning and afternoon breaks to take quick 15-minute walks. Staying safe  · Know your surroundings. Walk in a well-lighted, safe place. If it is dark, walk with a partner. Wear light-colored clothing. If you can, buy a vest or jacket that reflects light. · Carry a cell phone for emergencies. · Drink plenty of water. Take a water bottle with you when you walk. This is very important if it is hot out. · Be careful not to slip on wet or icy ground. You can buy \"grippers\" for your shoes to help keep you from slipping. · Pay attention to your walking surface. Use sidewalks and paths. · If you have breathing problems like asthma or COPD, ask your doctor when it is safe for you to walk outdoors.  Cold, dry air, smog, pollen, or other things in the air could cause breathing problems. Where can you learn more? Go to http://deepak-zachary.info/. Enter R159 in the search box to learn more about \"Walking for Exercise: Care Instructions. \"  Current as of: December 7, 2017  Content Version: 11.8  © 5247-4715 [employer]. Care instructions adapted under license by [employer] (which disclaims liability or warranty for this information). If you have questions about a medical condition or this instruction, always ask your healthcare professional. [address] any warranty or liability for your use of this information.

## 2019-03-05 NOTE — PROGRESS NOTES
Subjective: Mehreen Polanco is a 46 y.o. male is now 1 months status post laparoscopic sleeve gastrectomy. Doing well overall. He has lost a total of 39 pounds since surgery. Body mass index is 41.42 kg/m². Has lost 23% of EBW. Currently on a soft food diet without difficulty, reports headaches (thinks maybe from CPAP machine) and denies vomiting, abdominal pain and diarrhea. Taking in 60-70 oz water daily. Sources of protein include beans, chili, cheese. Canned chicken did not go well.    15-20  min of activity 7 days a week, including walking. Bowel movements are alternating constipation and regularity. The patient is not having any pain. . The patient is compliant with multivitamin.      Weight Loss Metrics 3/5/2019 2/20/2019 2/5/2019 2/1/2019 1/31/2019 1/28/2019 1/7/2019   Today's Wt 280 lb 8 oz 283 lb 6.4 oz 308 lb 14.4 oz 318 lb 3.2 oz 319 lb 319 lb 3.2 oz 320 lb 1.6 oz   BMI 41.42 kg/m2 41.85 kg/m2 45.62 kg/m2 46.99 kg/m2 47.11 kg/m2 47.14 kg/m2 47.27 kg/m2          Comorbidities:    Hypertension: improved, on Rx, decreased dose by 1/2 at last visit, denies lightheadedness of dizziness  Diabetes: improved, no medications, last blood sugar 106  Obstructive Sleep Apnea: unchanged, using CPAP  Hyperlipidemia: unchanged, on statin  Stress Urinary Incontinence: not applicable  Gastroesophageal Reflux: improved, on PPI   Weight related arthropathy:worsened, knees      Patient Active Problem List   Diagnosis Code    Bipolar affective disorder, mixed, mild (HCC) F31.61    Alcohol dependence in remission ([address] F10.21    Obesity, morbid ([address] E66.01    Morbid obesity ([address] E66.01    Morbid obesity with BMI of 45.0-49.9, adult ([address] E66.01, Z68.42    Low testosterone R79.89    Bipolar 1 disorder ([address] F31.9    GERD (gastroesophageal reflux disease) K21.9    Hypertension I10    Tremors of nervous system R25.1    Hypercholesteremia E78.00    Arthritis of both knees M17.0    Arthritis of spine M47.819    Arthritis of both hands M19.041, M19.042    Migraines G43.909    Diabetes ([facility]) E11.9    Sleep apnea G47.30    Hx of cardiomyopathy Z86.79    Diastasis recti M62.08    Type 2 diabetes mellitus with diabetic neuropathy ([facility]) E11.40    CVA (cerebral vascular accident) ([address] I63.9    Angina at rest ([address] I20.8    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)     Arthritis     Bipolar 1 disorder ([address]     Cerebral vasculitis     x 2    Concussion     childhood    CVA (cerebral vascular accident) ([address]     2016- due to vasculitis;october 2018    Diabetes ([address] 2016    Diastasis recti     Fibromyalgia     GERD (gastroesophageal reflux disease)     Hx of cardiomyopathy     ? reason for diagnosis 10 years ago   [facility] Hypercholesteremia     Hypertension 2016    Low testosterone     Migraines     Morbid obesity ([address]     Morbid obesity with BMI of 45.0-49.9, adult ([address]     Psychiatric disorder     bipolar    Sleep apnea     cpap     Tremors of nervous system        Past Surgical History:   Procedure Laterality Date    HX APPENDECTOMY      HX ORTHOPAEDIC      CTR both hands    HX ORTHOPAEDIC      cervical fusion    HX ORTHOPAEDIC      TORN MENISCUS    HX TONSILLECTOMY         Current Outpatient Medications   Medication Sig Dispense Refill    traZODone (DESYREL) 100 mg tablet Take 1 Tab by mouth nightly as needed for Sleep. 30 Tab 2    gabapentin (NEURONTIN) 600 mg tablet Take  by mouth daily (with lunch).  losartan (COZAAR) 50 mg tablet Take 25 mg by mouth daily.  primidone (MYSOLINE) 50 mg tablet Take  by mouth nightly.  B.infantis-B.ani-B.long-B.bifi (PROBIOTIC 4X) 10-15 mg TbEC Take  by mouth daily.       onabotulinumtoxinA (BOTOX INJECTION) by IntraMUSCular route every three (3) months.  oxyCODONE-acetaminophen (PERCOCET) 5-325 mg per tablet Take 1 Tab by mouth every six (6) hours as needed for Pain. Max Daily Amount: 4 Tabs. 30 Tab 0    risperiDONE (RISPERDAL) 2 mg tablet Take 1 Tab by mouth nightly. (Patient taking differently: Take 4 mg by mouth every other day.) 30 Tab 5    buPROPion XL (WELLBUTRIN XL) 300 mg XL tablet Take 1 Tab by mouth every morning. (Patient taking differently: Take 300 mg by mouth every morning.) 30 Tab 5    DULoxetine (CYMBALTA) 60 mg capsule Take 1 Cap by mouth daily. 30 Cap 5    gabapentin (NEURONTIN) 600 mg tablet Take 2 Tabs by mouth two (2) times a day. And 1 po at lunch (Patient taking differently: Take 1,200 mg by mouth two (2) times a day.) 150 Tab 5    QUEtiapine (SEROQUEL) 300 mg tablet Take 1 Tab by mouth nightly. 30 Tab 5    OXcarbaxepine (TRILEPTAL) 600 mg tablet Take 2 Tabs by mouth two (2) times a day. (Patient taking differently: Take 1,200 mg by mouth two (2) times a day.) 120 Tab 5    Omeprazole delayed release (PRILOSEC D/R) 20 mg tablet Take 40 mg by mouth two (2) times a day.  atorvastatin (LIPITOR) 20 mg tablet Take 40 mg by mouth nightly.          Allergies   Allergen Reactions    Sudafed [Pseudoephedrine Hcl] Other (comments)     Pt advised not to take due to stroke in aug '16       Review of Symptoms:       General - No history or complaints of unexpected fever or chills  Head/Neck - No history or complaints of headache or dizziness  Cardiac - No history or complaints of chest pain, palpitations, or shortness of breath  Pulmonary - No history or complaints of shortness of breath or productive cough  Gastrointestinal - as noted above  Genitourinary - No history or complaints of hematuria/dysuria or renal lithiasis  Musculoskeletal - No history or complaints of joint  muscular weakness  Hematologic - No history of any bleeding episodes  Neurologic - No history or complaints of  migraine headaches or neurologic symptoms        Objective:     Visit Vitals  /74 (BP 1 Location: Left arm, BP Patient Position: Sitting)   Pulse 76   Temp 97.6 °F (36.4 °C)   Ht 5' 9\" (1.753 m)   Wt 127.2 kg (280 lb 8 oz)   SpO2 100%   BMI 41.42 kg/m²       General:  alert, cooperative, no distress, appears stated age   Chest: lungs clear to auscultation, breath sounds equal and symmetric, no rhonchi, rales or wheezes, no accessory muscle use   Cor:   Regular rate and rhythm, S1S2 present or without murmur or extra heart sounds   Abdomen: soft, bowel sounds active, non-tender, no masses or organomegaly   Incisions:   healing well, no drainage, no erythema, no hernia, no seroma, no swelling, no dehiscence, incision well approximated       Assessment:   History of Morbid obesity, status post laparoscopic sleeve gastrectomy. Doing well postoperatively. HTN - on Rx, f/u PCP  ADRIEL - on CPAP, recommend getting appt with pulmonary to eval settings  DM - off Rx, f/u PCP  GERD - on PPI    Plan:     1. Increase activity to the goal of 30 minutes daily   2. Start 500mg [place] today, stop after 6 months out from surgery. 3. Patient is cleared to return to work without restrictions. 4. Can stop probiotic    5. Advance diet to solid phase. Reminded to measure portions, continue high protein, low carbohydrate diet. Reminded to eat regularly, to eat slowly & not to drink with meals. Refer to the handbook given in class. 6. Patient has appt with dietician directly after this visit. 7. Continue multivitamin and add the additional vitamin supplementation (Ca, B complex, B12, D, all listed in handbook)  8. Continue current medications and follow up with PCP for management of regimen. 9. Continue cardio exercise and add resistance exercises. Discussed with patient. Minimum of 30 minutes of exercise daily. 10. Encouraged to attend support group   11. I have discussed this plan with patient and they verbalized understanding  12.  Follow up in 4 weeks or sooner if patient has questions, concerns or worsening of condition, if unable to reach our office, patient should report to the ED. 15. Mr. Elly Carmichael has a reminder for a \"due or due soon\" health maintenance. I have asked that he contact his primary care provider for a follow-up on this health maintenance.

## 2019-03-05 NOTE — PROGRESS NOTES
Pt given one on one diet education. Patient intake was reviewed by Nurse Practitioner. He is tolerating protein foods, protein supplements, and fluid well at this time. Reviewed diet progression for months 2-6. Patient and spouse present, he appears to have a good understanding of the diet progression, food choices, and dietary/exercise habits for successful weight loss and nourishment one month after surgery. The  material included: post-op diet progression and portion sizes (including low fat, low sugar food recommendations and emphasis on protein foods and protein supplements), good beverage choices, reading a food label, vitamins/minerals required after weight loss surgery, and encouraging dietary and exercise habits that lead to weight loss success. Pt also received a restaurant card, which tells restaurants that the patient had a procedure that decreases the size of their stomach so the restaurant may let them order off the children's menu, the senior's menu, or a smaller portion for a reduced rate.      Gee Shah RD

## 2019-04-02 ENCOUNTER — OFFICE VISIT (OUTPATIENT)
Dept: SURGERY | Age: 49
End: 2019-04-02

## 2019-04-02 VITALS
SYSTOLIC BLOOD PRESSURE: 118 MMHG | HEIGHT: 69 IN | DIASTOLIC BLOOD PRESSURE: 69 MMHG | HEART RATE: 70 BPM | OXYGEN SATURATION: 94 % | TEMPERATURE: 97.5 F | BODY MASS INDEX: 40.43 KG/M2 | WEIGHT: 273 LBS

## 2019-04-02 DIAGNOSIS — K90.9 INTESTINAL MALABSORPTION, UNSPECIFIED TYPE: Primary | ICD-10-CM

## 2019-04-02 DIAGNOSIS — Z98.84 S/P BARIATRIC SURGERY: ICD-10-CM

## 2019-04-02 NOTE — PATIENT INSTRUCTIONS
Patient Instructions      1. [employer] (  2. Remember hydration goals - minimum of 64 ounces of liquids per day (dehydration is the number one reason for hospital readmission). 3. Sleep 7-9 hours each night to keep your metabolism up. 4. Continue to monitor carbohydrate and protein intake you need a minimum of  Grams of protein daily- remember to keep your total carbohydrates to 50 grams or less per day for best results. 5. To maximize weight loss keep your caloric intake between 800-1,200 calories daily. If you are exercising excessively, such as training for a marathon, you need to keep a food log and meet with the dietician so they can advise you on your diet choices, carbohydrate intake and caloric intake. 6. Continue to work towards exercise goals - 60-90 minutes, 5 times a week minimum of deliberate, aerobic exercise is the ultimate goal with strength training 2 times each week. Refer to [email or web address] for  information. 7. Remember to take vitamins as directed in your handbook. 8. Attend support group the 2nd Thursday of each month. 9. Constipation: Milk of Magnesia is for immediate relief only. Miralax is to be used every day if constipation is a chronic problem. 10. Diarrhea: patients will occasionally develop lactose intolerance after surgery. Check to see if your protein shake has whey in it. If it does try a protein powder or drink that does not have whey and stop all yogurts, cheeses and milks to see if the diarrhea goes away. 11. If you have had labs drawn. We will only call you if you have abnormal results. Otherwise you can access the lab results in \"[employer]\". You will only need the access code the first time you sign on. 12. Call us at [phone] or email us through [facility]" with questions,     concerns or worsening of condition, we have someone on call 24 hours a day.   If you are unable to reach our office, you are to go to your Primary Care Physician or the Emergency Department. Supplement Resource Guide    Importance of Protein:   Maintains lean body mass, produces antibodies to fight off infections, heals wounds, minimizes hair loss, helps to give you energy, helps with satiety, and keeping you full between meals. Importance of Calcium:  Needed for healthy bones and teeth, normal blood clotting, and nervous system functioning, higher risk of osteoporosis and bone disease with non-compliance. Importance of Multivitamins: Many functions. Supply you with extra nutrients that you may be missing from food. May lead to iron deficiency anemia, weakness, fatigue, and many other symptoms with non-compliance. Importance of B Vitamins:  Important for red blood cell formation, metabolism, energy, and helps to maintain a healthy nervous system. Protein Supplement  Liquid diet phase: consume 90-100g protein daily. Once you are eating consume  35-50g protein each day from your protein supplement. 0-3 g fat per serving  0-3 g sugar per serving    The body can only absorb 30g of protein at one time, so do not consume more than that at one time. Recommend: Lifelong use Multi-vitamin Supplement:      Start immediately after surgery: any complete chewable, such as: [employer]s Complete chewables. Avoid [employer] sours or gummies. They lack iron and other important nutrients and also have added sugar. Continue with a chewable vitamin or change to an adult complete multivitamin one month after surgery. Menstruating women can take a prenatal vitamin. Make sure it has at least 18 mg iron and 121-944 mcg folic acid Calcium Supplement:     Start taking within one month after surgery. Look for:   Calcium Citrate Plus D (1500 mg per day)    Recommend: Citracal    Avoid chocolate chewable calcium.      Can use chewable bariatric or [employer] brand or similar chewable. The body cannot absorb more than 500-600 mg of calcium at one time. Take for Life     Vitamin B12  B Complex Vitamin    Start taking both within one month after surgery. Vitamin B12 (sublingual): Take 1000 mcg of Vitamin B12 three times weekly    Must take sublingually (meaning you put it under your tongue) or in a liquid drop form for easy absorption. B Complex Vitamin:   Take one pill daily or liquid drop form daily; as directed on bottle.      Take for Life

## 2019-04-02 NOTE — PROGRESS NOTES
Subjective: Howie Agrawal is a 46 y.o. male is now 2 months status post laparoscopic sleeve gastrectomy. Doing well overall. He has lost a total of 46 pounds since surgery. Body mass index is 40.32 kg/m². Has lost 26% of EBW. Currently on a solid food diet without difficulty, reports no issues related to his weight loss surgery and denies vomiting and abdominal pain. Taking in 60oz water daily. Sources of protein include tuna, ground beef, cheese and eggs. Eating 5-6 meals each day. 25-30 min of activity 5 days a week, including walking. Patient is sleeping 8+ hours a night on average. Bowel movements are constipated, he has not been using anything for this. The patient is not having any pain. . The patient is compliant with multivitamins, calcium, Vit D, B complex and B12 supplements.      Weight Loss Metrics 4/2/2019 3/5/2019 2/20/2019 2/5/2019 2/1/2019 1/31/2019 1/28/2019   Today's Wt 273 lb 280 lb 8 oz 283 lb 6.4 oz 308 lb 14.4 oz 318 lb 3.2 oz 319 lb 319 lb 3.2 oz   BMI 40.32 kg/m2 41.42 kg/m2 41.85 kg/m2 45.62 kg/m2 46.99 kg/m2 47.11 kg/m2 47.14 kg/m2          Comorbidities:  Hypertension: improved, on Rx, decreased dose by 1/2 at last visit, denies lightheadedness of dizziness  Diabetes: improved, no medications, last blood sugar 103  Obstructive Sleep Apnea: improved, stopped CPAP has f/u with pulmonologist in July  Hyperlipidemia: unchanged, on statin  Stress Urinary Incontinence: not applicable  Gastroesophageal Reflux: improved, on PPI   Weight related arthropathy:worsened, knees         Patient Active Problem List   Diagnosis Code    [facility]ipolar affective disorder, mixed, mild ([facility]) F31.61    Alcohol dependence in remission ([address] F10.21    Obesity, morbid ([address] E66.01    Morbid obesity ([address] E66.01    Low testosterone R79.89    Bipolar 1 disorder ([address] F31.9    GERD (gastroesophageal reflux disease) K21.9    Hypertension I10    Tremors of nervous system R25.1    Hypercholesteremia E78.00    Arthritis of both knees M17.0    Arthritis of spine M47.819    Arthritis of both hands M19.041, M19.042    Migraines G43.909    Sleep apnea G47.30    Hx of cardiomyopathy Z86.79    Diastasis recti M62.08    Type 2 diabetes mellitus with diabetic neuropathy (HCC) E11.40    CVA (cerebral vascular accident) ([address] I63.9    Ang[facility] rest [facility] I20.8    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    Arthritis M19.90    Intestinal malabsorption K90.9    S/P laparoscopic sleeve gastrectomy Z98.84        Past Medical History:   Diagnosis Date    Adverse effect of anesthesia     slow to awaken    Angina at rest [facility])     Arthritis     Bipolar 1 disorder ([address]     Cerebral vasculitis     x 2    Concussion     childhood    CVA (cerebral vascular accident) ([address]     2016- due to vasculitis;october 2018    Diabetes ([facility] 2016    Diastasis recti     Fibromyalgia     GERD (gastroesophageal reflux disease)     Hx of cardiomyopathy     ? reason for diagnosis 10 years ago    Hypercholesteremia     Hypertension 2016    Low testosterone     Migraines     Morbid obesity ([address]     Morbid obesity with BMI of 45.0-49.9, adult ([address]     Psychiatric disorder     bipolar    Sleep apnea     cpap     Tremors of nervous system        Past Surgical History:   Procedure Laterality Date    HX APPENDECTOMY      HX ORTHOPAEDIC      CTR both hands    HX ORTHOPAEDIC      cervical fusion    HX ORTHOPAEDIC      TORN MENISCUS    HX TONSILLECTOMY         Current Outpatient Medications   Medication Sig Dispense Refill    ursodiol (BILL FORTE) 500 mg tablet Take 1 Tab by mouth daily for 30 days. 30 Tab 4    traZODone (DESYREL) 100 mg tablet Take 1 Tab by mouth nightly as needed for Sleep. 30 Tab 2    gabapentin (NEURONTIN) 600 mg tablet Take  by mouth daily (with lunch).  losartan (COZAAR) 50 mg tablet Take 25 mg by mouth daily.  primidone (MYSOLINE) 50 mg tablet Take  by mouth nightly.       B.infantis-B.ani-B.long-B.bifi (PROBIOTIC 4X) 10-15 mg TbEC Take  by mouth daily.  onabotulinumtoxinA (BOTOX INJECTION) by IntraMUSCular route every three (3) months.  risperiDONE (RISPERDAL) 2 mg tablet Take 1 Tab by mouth nightly. (Patient taking differently: Take 4 mg by mouth every other day.) 30 Tab 5    buPROPion XL (WELLBUTRIN XL) 300 mg XL tablet Take 1 Tab by mouth every morning. (Patient taking differently: Take 300 mg by mouth every morning.) 30 Tab 5    DULoxetine (CYMBALTA) 60 mg capsule Take 1 Cap by mouth daily. 30 Cap 5    gabapentin (NEURONTIN) 600 mg tablet Take 2 Tabs by mouth two (2) times a day. And 1 po at lunch (Patient taking differently: Take 1,200 mg by mouth two (2) times a day.) 150 Tab 5    QUEtiapine (SEROQUEL) 300 mg tablet Take 1 Tab by mouth nightly. 30 Tab 5    OXcarbaxepine (TRILEPTAL) 600 mg tablet Take 2 Tabs by mouth two (2) times a day. (Patient taking differently: Take 1,200 mg by mouth two (2) times a day.) 120 Tab 5    Omeprazole delayed release (PRILOSEC D/R) 20 mg tablet Take 40 mg by mouth two (2) times a day.  atorvastatin (LIPITOR) 20 mg tablet Take 40 mg by mouth nightly.          Allergies   Allergen Reactions    Sudafed [Pseudoephedrine Hcl] Other (comments)     Pt advised not to take due to stroke in aug '16       Review of Systems:  General - No history or complaints of unexpected fever or chills  Head/Neck - No history or complaints of headache or dizziness  Cardiac - No history or complaints of chest pain, palpitations, or shortness of breath  Pulmonary - No history or complaints of shortness of breath or productive cough  Gastrointestinal - as noted above  Genitourinary - No history or complaints of hematuria/dysuria or renal lithiasis  Musculoskeletal - No history or complaints of joint  muscular weakness  Hematologic - No history of any bleeding episodes  Neurologic - No history or complaints of  migraine headaches or neurologic symptoms    Objective:     Visit Vitals  /69 (BP 1 Location: Left arm, BP Patient Position: Sitting)   Pulse 70   Temp 97.5 °F (36.4 °C)   Ht 5' 9\" (1.753 m)   Wt 123.8 kg (273 lb)   SpO2 94%   BMI 40.32 kg/m²       General:  alert, cooperative, no distress, appears stated age   Chest: lungs clear to auscultation, breath sounds equal and symmetric, no rhonchi, rales or wheezes, no accessory muscle use   Cor:   Regular rate and rhythm or without murmur or extra heart sounds   Abdomen: soft, bowel sounds active, non-tender, no masses or organomegaly   Incisions:   healed well       Assessment:   History of Morbid obesity, status post laparoscopic sleeve gastrectomy. Doing well postoperatively. Patient is to count calories for a few days to make sure he is eating enough. Goal is 800-1,200 calories daily. Constipation - start taking Miralax daily  Hyperlipidemia - continue meds and f/u PCP  HTN - f/u with cardiologist  GERD - continue PPI    Plan:       1. Reminded to measure portions, continue high protein, low carbohydrate diet. Reminded to eat regularly, to eat slowly & not to drink with meals. 2. Sleep goal is 7-9 hours a night. Patient education given on the effects of sleep deprivation on weight control. 3. Continue vitamin supplementation  4. Continue current medications and follow up with PCP for management of regimen. 5. Continue cardio exercise and resistance exercises. 60-90 min aerobic exercise 5 times a week and strength training 2 days each week. 6. Encouraged to attend support group   7. I have discussed this plan with patient and they verbalized understanding  8. Follow up in 2 months or sooner if patient has questions, concerns or worsening of condition, if unable to reach our office, patient should report to the ED. 9. Mr. Mariano Gambino has a reminder for a \"due or due soon\" health maintenance.  I have asked that he contact his primary care provider for a follow-up on this health maintenance.

## 2019-04-22 ENCOUNTER — TELEPHONE (OUTPATIENT)
Dept: SURGERY | Age: 49
End: 2019-04-22

## 2019-04-22 NOTE — TELEPHONE ENCOUNTER
Pt needs to have the medication Zipsor cleared by us before his pain management doctor will prescribe it to him.   Their number Dr. Elle Hannah phone# [phone] or fax  Fax Ship[facility] Pranay [phone]

## 2019-04-23 NOTE — TELEPHONE ENCOUNTER
Left mess for pt to call office. I also called pt pain management doctor and left mess for his nurse to call us back.  When they call back per Tian Hernandez it is okay for pt to have the medication Zipsor

## 2019-04-23 NOTE — TELEPHONE ENCOUNTER
Patient has a sleeve gastrectomy. Jae Mccain is an NSAID. It is okay for sleeve gastrectomy patient's to take NSAIDs and/or receive steroids once they are at least 2 months post op. Please notify patient's doctor to let them know it is okay. Thank you.

## 2019-04-29 NOTE — TELEPHONE ENCOUNTER
Pt called he would like a letter typed up from the Dr stating it is okay for him to take [address] and faxed over to pain management.  I will forward this to Ever Fu to see if okay to send

## 2019-05-02 NOTE — TELEPHONE ENCOUNTER
Called Dr Ledesma Even office [phone] and spoke with Marilynn Brown. She is aware that Hans Aaron stated that pt is okay to take Zipsor.

## 2019-05-08 ENCOUNTER — TELEPHONE (OUTPATIENT)
Dept: SURGERY | Age: 49
End: 2019-05-08

## 2019-05-14 LAB
ATRIAL RATE: 83 BPM
CALCULATED P AXIS, ECG09: 40 DEGREES
CALCULATED R AXIS, ECG10: 54 DEGREES
CALCULATED T AXIS, ECG11: 49 DEGREES
DIAGNOSIS, 93000: NORMAL
P-R INTERVAL, ECG05: 180 MS
Q-T INTERVAL, ECG07: 388 MS
QRS DURATION, ECG06: 104 MS
QTC CALCULATION (BEZET), ECG08: 455 MS
VENTRICULAR RATE, ECG03: 83 BPM

## 2019-06-04 ENCOUNTER — OFFICE VISIT (OUTPATIENT)
Dept: SURGERY | Age: 49
End: 2019-06-04

## 2019-06-04 VITALS
TEMPERATURE: 97.6 F | SYSTOLIC BLOOD PRESSURE: 113 MMHG | HEART RATE: 70 BPM | BODY MASS INDEX: 37.06 KG/M2 | HEIGHT: 69 IN | WEIGHT: 250.2 LBS | DIASTOLIC BLOOD PRESSURE: 70 MMHG | OXYGEN SATURATION: 99 %

## 2019-06-04 DIAGNOSIS — E11.40 TYPE 2 DIABETES MELLITUS WITH DIABETIC NEUROPATHY, WITHOUT LONG-TERM CURRENT USE OF INSULIN (HCC): ICD-10-CM

## 2019-06-04 DIAGNOSIS — G47.30 SLEEP APNEA, UNSPECIFIED TYPE: ICD-10-CM

## 2019-06-04 DIAGNOSIS — E78.00 HYPERCHOLESTEREMIA: ICD-10-CM

## 2019-06-04 DIAGNOSIS — I10 ESSENTIAL HYPERTENSION: ICD-10-CM

## 2019-06-04 DIAGNOSIS — M19.90 ARTHRITIS: ICD-10-CM

## 2019-06-04 DIAGNOSIS — K90.9 INTESTINAL MALABSORPTION, UNSPECIFIED TYPE: Primary | ICD-10-CM

## 2019-06-04 DIAGNOSIS — Z98.84 S/P LAPAROSCOPIC SLEEVE GASTRECTOMY: ICD-10-CM

## 2019-06-04 DIAGNOSIS — K21.9 GASTROESOPHAGEAL REFLUX DISEASE WITHOUT ESOPHAGITIS: ICD-10-CM

## 2019-06-04 RX ORDER — TEMAZEPAM 15 MG/1
1 CAPSULE ORAL
COMMUNITY
Start: 2016-08-29 | End: 2019-08-12

## 2019-06-04 RX ORDER — CETIRIZINE HCL 10 MG
1 TABLET ORAL DAILY
COMMUNITY
Start: 2014-06-25

## 2019-06-04 RX ORDER — DICLOFENAC POTASSIUM 25 MG/1
25 CAPSULE, LIQUID FILLED ORAL DAILY
COMMUNITY

## 2019-06-04 RX ORDER — NITROGLYCERIN 0.4 MG/1
TABLET SUBLINGUAL
COMMUNITY
Start: 2018-04-25

## 2019-06-04 RX ORDER — TESTOSTERONE CYPIONATE 200 MG/ML
300 INJECTION INTRAMUSCULAR
COMMUNITY
Start: 2019-01-30

## 2019-06-04 RX ORDER — AZATHIOPRINE 50 MG/1
50 TABLET ORAL
Status: ON HOLD | COMMUNITY
Start: 2018-12-12 | End: 2019-12-02

## 2019-06-04 RX ORDER — ERENUMAB-AOOE 70 MG/ML
70 INJECTION SUBCUTANEOUS
COMMUNITY
Start: 2019-02-18

## 2019-06-04 RX ORDER — URSODIOL 500 MG/1
500 TABLET, FILM COATED ORAL
COMMUNITY
End: 2019-11-18

## 2019-06-04 RX ORDER — CARBAMAZEPINE 200 MG/1
200 TABLET ORAL
COMMUNITY
End: 2019-07-24

## 2019-06-04 NOTE — PATIENT INSTRUCTIONS
Patient Instructions      1. Remember hydration goals - minimum of 64 ounces of liquids per day (dehydration is the number one reason for hospital readmission). 2. Continue to monitor carbohydrate and protein intake you need a minimum of  Grams of protein daily- remember to keep your total carbohydrates to 50 grams or less per day for best results. Focus on getting 5577-6992 calories a day. 3. Continue to work towards exercise goals - 60-90 minutes, 5 times a week minimum of deliberate, aerobic exercise is the ultimate goal with strength training 2 times each week. Refer to [employer] for  information. 4. Remember to take vitamins as directed. 5. Attend support group the 2nd Thursday of each month. 6. Use Miralax if you become constipated. 7. Call us at [phone] or email us through [email or web address]" with questions,     concerns or worsening of condition, we have someone on call 24 hours a day. If you are unable to reach our office, you are to go to your Primary Care Physician or the Emergency Department. 8. If any labs have been ordered as a part of this visit, you will only be contacted if found to be abnormal. If you would like to look at the results for yourself, you can sign up for 'My Chart\". [facility] office staff can offer you assistance with setting that up. Supplement Resource Guide    Importance of Protein:   Maintains lean body mass, produces antibodies to fight off infections, heals wounds, minimizes hair loss, helps to give you energy, helps with satiety, and keeping you full between meals. Importance of Calcium:  Needed for healthy bones and teeth, normal blood clotting, and nervous system functioning, higher risk of osteoporosis and bone disease with non-compliance. Importance of Multivitamins: Many functions. Supply you with extra nutrients that you may be missing from food.   May lead to iron deficiency anemia, weakness, fatigue, and many other symptoms with non-compliance. Importance of B Vitamins:  Important for red blood cell formation, metabolism, energy, and helps to maintain a healthy nervous system. Protein Supplement  Find one you like now. Use immediately after surgery. Look for:  35-50g protein each day from your protein supplement once you reach the progression diet. 0-3 g fat per serving  0-3 g sugar per serving    Protein drinks should be split in separate dosages. Recommend: Lifelong  1 year + Calcium Supplement:     Start taking within a month after surgery. Look for: Calcium Citrate Plus D (1500 mg per day)  Recommend: Citracal     .            Avoid chocolate chewable calcium. Can use chewable bariatric or [employer] brand or similar chewable. The body cannot absorb more than 500-600 mg of calcium at a time. Take for Life Multi-vitamin Supplement:      Start immediately after surgery: any complete chewable, such as: [place]s Complete chewables. Avoid [place] sours or gummies. They lack iron and other important nutrients and also have added sugar. Continue with chewable vitamin or change to adult complete multivitamin one month after surgery. Menstruating women can take a prenatal vitamin. Make sure has at least 18 mg iron and 233-545 mcg folic acid   Vitamin X29, B Complex Vitamin, and Biotin  Start taking within a month after surgery. Vitamin B12:  1000 mcg of Vitamin B12 three times weekly    Must take sublingually (meaning you take it under your tongue) or in a liquid drop form for easy absorption. B Complex Vitamin: Take a pill or liquid drop form once daily. Biotin: This vitamin can help prevent hair loss. Recommend 5mg   (5000 mcg) a day  Biotin is Optional         Walking for Exercise: Care Instructions  Your Care Instructions    Walking is one of the easiest ways to get the exercise you need for good health.  A brisk, 30-minute walk each day can help you feel better and have more energy. It can help you lower your risk of disease. Walking can help you keep your bones strong and your heart healthy. Check with your doctor before you start a walking plan if you have heart problems, other health issues, or you have not been active in a long time. Follow your doctor's instructions for safe levels of exercise. Follow-up care is a key part of your treatment and safety. Be sure to make and go to all appointments, and call your doctor if you are having problems. It's also a good idea to know your test results and keep a list of the medicines you take. How can you care for yourself at home? Getting started  · Start slowly and set a short-term goal. For example, walk for 5 or 10 minutes every day. · Bit by bit, increase the amount you walk every day. Try for at least 30 minutes on most days of the week. You also may want to swim, bike, or do other activities. · If finding enough time is a problem, it is fine to be active in blocks of 10 minutes or more throughout your day and week. · To get the heart-healthy benefits of walking, you need to walk briskly enough to increase your heart rate and breathing, but not so fast that you cannot talk comfortably. · Wear comfortable shoes that fit well and provide good support for your feet and ankles. Staying with your plan  · After you've made walking a habit, set a longer-term goal. You may want to set a goal of walking briskly for longer or walking farther. Experts say to do 2½ hours of moderate activity a week. A faster heartbeat is what defines moderate-level activity. · To stay motivated, walk with friends, coworkers, or pets. · Use a phone yeimi or pedometer to track your steps each day. Set a goal to increase your steps. Once you get there, set a higher goal. Aim for 10,000 steps a day. · If the weather keeps you from walking outside, go for walks at the mall with a friend.  Local schools and church may have indoor gyms where you can walk. Fitting a walk into your workday  · Park several blocks away from work, or get off the bus a few stops early. · Use the stairs instead of the elevator, at least for a few floors. · Suggest holding meetings with colleagues during a walk inside or outside the building. · Use the restroom that is the farthest from your desk or workstation. · Use your morning and afternoon breaks to take quick 15-minute walks. Staying safe  · Know your surroundings. Walk in a well-lighted, safe place. If it is dark, walk with a partner. Wear light-colored clothing. If you can, buy a vest or jacket that reflects light. · Carry a cell phone for emergencies. · Drink plenty of water. Take a water bottle with you when you walk. This is very important if it is hot out. · Be careful not to slip on wet or icy ground. You can buy \"grippers\" for your shoes to help keep you from slipping. · Pay attention to your walking surface. Use sidewalks and paths. · If you have breathing problems like asthma or COPD, ask your doctor when it is safe for you to walk outdoors. Cold, dry air, smog, pollen, or other things in the air could cause breathing problems. Where can you learn more? Go to http://deepak-zachary.info/. Enter R159 in the search box to learn more about \"Walking for Exercise: Care Instructions. \"  Current as of: December 7, 2017  Content Version: 11.8  © 0819-9851 [employer]. Care instructions adapted under license by [employer] (which disclaims liability or warranty for this information). If you have questions about a medical condition or this instruction, always ask your healthcare professional. [address] any warranty or liability for your use of this information.

## 2019-07-24 ENCOUNTER — CLINICAL SUPPORT (OUTPATIENT)
Dept: SURGERY | Age: 49
End: 2019-07-24

## 2019-07-24 VITALS — BODY MASS INDEX: 35.4 KG/M2 | WEIGHT: 239 LBS | HEIGHT: 69 IN

## 2019-07-24 DIAGNOSIS — Z98.84 S/P LAPAROSCOPIC SLEEVE GASTRECTOMY: Primary | ICD-10-CM

## 2019-07-24 NOTE — PATIENT INSTRUCTIONS
Goals: 1. Sodium keep to 1500 mg or less per day     2. Protein - work to keep to 100 grams a day     3. Work to eat a snack between lunch and dinner - focusing on protein and non-starchy vegetables. 4. Follow the 6+ month diet in your education book     5.  Snacks: high protein, low carbohydrat yogurt, hard boiled eggs, cheese and nuts, etc

## 2019-07-24 NOTE — PROGRESS NOTES
Gemma Lau is a 46 y.o. male who is 6 months s/p sleeve gastrectomy. The patient has lost 80 lbs since surgery. Body mass index is 35.29 kg/m². The patient has lost 49% EBW. Vitamins:all recommended vitamins       Diet History:  Breakfast  What are you eating and how much? Protein shake    When? ii   Where? iii   Snacks  What are you eating and how much? Jerky, cheese (swiss cheese 1 chunk)            Hydration  What are you eating and how much? Water or equate SF brand of SF beverages            Lunch  What are you eating and how much? Vegetable burger with broccoli, 1 cup of cashews            Snacks  What are you eating and how much? Beef jerky, maybe 1/2 bag of pork rinds           Hydration  What are you eating and how much? Water or SF packet            Dinner  What are you eating and how much? Vegetable burger with broccoli, 1 cup of cashews  Or 2 [place] national hotdogs    When? ii   Where? iii   Snacks  What are you eating and how much? Cheese, pork rinds, nuts            Hydration  What are you eating and how much? water           Hydration:  Calories:  Protein:  Carbohydrates:   Exercise:  Do you currently have an exercise routine? yes  What kind of exercise do you do? patient is walking 1 mile a day  . For how long? 30+ minutes For how often? daily    Goals:   1. Sodium keep to 1500 mg or less per day     2. Protein - work to keep to 100 grams a day     3. Work to eat a snack between lunch and dinner - focusing on protein and non-starchy vegetables. 4. Follow the 6+ month diet in your education book     5.  Snacks: high protein, low carbohydrate yogurt, hard boiled eggs, cheese and nuts, etc     F/u:

## 2019-07-30 ENCOUNTER — DOCUMENTATION ONLY (OUTPATIENT)
Dept: SURGERY | Age: 49
End: 2019-07-30

## 2019-07-30 RX ORDER — URSODIOL 500 MG/1
500 TABLET, FILM COATED ORAL
OUTPATIENT
Start: 2019-07-30

## 2019-07-30 NOTE — PROGRESS NOTES
Medication refill request received for ursodiol was denied as patient has completed duration of therapy.

## 2019-08-01 RX ORDER — URSODIOL 500 MG/1
500 TABLET, FILM COATED ORAL
OUTPATIENT
Start: 2019-08-01

## 2019-08-06 NOTE — TELEPHONE ENCOUNTER
Per Montrell Diez notes she stated request for ursodiol was denied as patient has completed duration of therapy. This was written on 7/30/2019. I called pt and he is aware.

## 2019-08-08 ENCOUNTER — OFFICE VISIT (OUTPATIENT)
Dept: SURGERY | Age: 49
End: 2019-08-08

## 2019-08-08 VITALS
BODY MASS INDEX: 34.18 KG/M2 | HEIGHT: 69 IN | DIASTOLIC BLOOD PRESSURE: 70 MMHG | HEART RATE: 70 BPM | OXYGEN SATURATION: 99 % | SYSTOLIC BLOOD PRESSURE: 98 MMHG | WEIGHT: 230.8 LBS | TEMPERATURE: 97.8 F

## 2019-08-08 DIAGNOSIS — K90.9 INTESTINAL MALABSORPTION, UNSPECIFIED TYPE: ICD-10-CM

## 2019-08-08 DIAGNOSIS — Z98.84 S/P BARIATRIC SURGERY: ICD-10-CM

## 2019-08-08 DIAGNOSIS — B37.2 INTERTRIGINOUS CANDIDIASIS: Primary | ICD-10-CM

## 2019-08-08 PROBLEM — E11.40 TYPE 2 DIABETES MELLITUS WITH DIABETIC NEUROPATHY (HCC): Status: RESOLVED | Noted: 2018-01-15 | Resolved: 2019-08-08

## 2019-08-08 RX ORDER — ASPIRIN 81 MG/1
TABLET ORAL DAILY
COMMUNITY

## 2019-08-08 RX ORDER — MAGNESIUM 200 MG
1000 TABLET ORAL
COMMUNITY
End: 2021-03-23 | Stop reason: ALTCHOICE

## 2019-08-08 RX ORDER — CHOLECALCIFEROL (VITAMIN D3) 125 MCG
2000 CAPSULE ORAL DAILY
COMMUNITY

## 2019-08-08 RX ORDER — NYSTATIN 100000 U/G
CREAM TOPICAL 2 TIMES DAILY
Qty: 30 G | Refills: 6 | Status: SHIPPED | OUTPATIENT
Start: 2019-08-08 | End: 2021-03-23 | Stop reason: SDUPTHER

## 2019-08-08 NOTE — PROGRESS NOTES
Polina Osbornrichard presents today for   Chief Complaint   Patient presents with    Follow-up     Pt is here today for his follow up       Is someone accompanying this pt? no    Is the patient using any DME equipment during [address]? no    Depression Screening:  3 most recent PHQ Screens 8/8/2019   PHQ Not Done Active Diagnosis of Depression or Bipolar Disorder   Little interest or pleasure in doing things Several days   Feeling down, depressed, irritable, or hopeless Not at all   Total Score PHQ 2 1       Learning Assessment:  Learning Assessment 8/8/2019   PRIMARY LEARNER Patient   HIGHEST LEVEL OF EDUCATION - PRIMARY LEARNER  SOME COLLEGE   BARRIERS PRIMARY LEARNER [address] CAREGIVER No   [address]    NEED No   LEARNER PREFERENCE PRIMARY DEMONSTRATION   LEARNING SPECIAL TOPICS no   ANSWERED BY patient   RELATIONSHIP SELF       Abuse Screening:  Abuse Screening Questionnaire 8/8/2019   Do you ever feel afraid of your partner? N   Are you in a relationship with someone who physically or mentally threatens you? N   Is it safe for you to go home? Y       Fall Risk  No flowsheet data found. Coordination of Care:  1. Have you been to the ER, urgent care clinic since your last visit? Hospitalized since your last visit? no    2. Have you seen or consulted any other health care providers outside of the [address] [place] [address] since your last visit? Include any pap smears or colon screening.  no

## 2019-08-08 NOTE — PATIENT INSTRUCTIONS
Patient Instructions 1. Remember hydration goals - minimum of 64 ounces of liquids per day (dehydration is the number one reason for hospital readmission). 2. Sleep 7-9 hours each night to keep your metabolism up. 3. Continue to monitor carbohydrate and protein intake you need a minimum of  Grams of protein daily- remember to keep your total carbohydrates to 50 grams or less per day for best results. 4. To maximize weight loss keep your caloric intake between 800-1,200 calories daily. If you are exercising excessively, such as training for a marathon, you need to keep a food log and meet with the dietician so they can advise you on your diet choices, carbohydrate intake and caloric intake. 5. Continue to work towards exercise goals - 60-90 minutes, 5 times a week minimum of deliberate, aerobic exercise is the ultimate goal with strength training 2 times each week. Refer to [employer] for  information. 6. Remember to take vitamins as directed in your handbook. 7. Attend support group the 2nd Thursday of each month. 8. Constipation: Milk of Magnesia is for immediate relief only. Miralax is to be used every day if constipation is a chronic problem. 9. Diarrhea: patients will occasionally develop lactose intolerance after surgery. Check to see if your protein shake has whey in it. If it does try a protein powder or drink that does not have whey and stop all yogurts, cheeses and milks to see if the diarrhea goes away. 10. If you have had labs drawn. We will only call you if you have abnormal results. Otherwise you can access the lab results in \"[facility]\". You will only need the access code the first time you sign on.    
11. Call us at [phone] or email us through [facility]" with questions,     concerns or worsening of condition, we have someone on call 24 hours a day. If you are unable to reach our office, you are to go to your Primary Care Physician or the Emergency Department. Supplement Resource Guide Importance of Protein:  
Maintains lean body mass, produces antibodies to fight off infections, heals wounds, minimizes hair loss, helps to give you energy, helps with satiety, and keeping you full between meals. Importance of Calcium: 
Needed for healthy bones and teeth, normal blood clotting, and nervous system functioning, higher risk of osteoporosis and bone disease with non-compliance. Importance of Multivitamins: Many functions. Supply you with extra nutrients that you may be missing from food. May lead to iron deficiency anemia, weakness, fatigue, and many other symptoms with non-compliance. Importance of B Vitamins: 
Important for red blood cell formation, metabolism, energy, and helps to maintain a healthy nervous system. Protein Supplement Liquid diet phase: consume 90-100g protein daily. Once you are eating consume 35-50g protein each day from your protein supplement. 0-3 g fat per serving 0-3 g sugar per serving The body can only absorb 30g of protein at one time, so do not consume more than that at one time. Multi-vitamin Supplement:   
Start immediately after surgery: any complete chewable, such as: [place]s Complete chewables. Avoid [place] sours or gummies. They lack iron and other important nutrients and also have added sugar. Continue with a chewable vitamin or change to an adult complete multivitamin one month after surgery. Menstruating women can take a prenatal vitamin. Make sure it has at least 18 mg iron and 200-266 mcg folic acid Calcium Supplement:  
 
Start taking within one month after surgery. Look for:  
Calcium Citrate Plus D (1500 mg per day) Recommend: Citracal 
 
Avoid chocolate chewable calcium. Can use chewable bariatric or [employer] brand or similar chewable. The body cannot absorb more than 500-600 mg of calcium at one time. Take for Life Vitamin D Take 3,000 international units daily Vitamin B12 B Complex Vitamin Start taking both within one month after surgery. Vitamin B12 (sublingual): Take 1000 mcg of Vitamin B12 three times weekly Must take sublingually (meaning you put it under your tongue) or in a liquid drop form for easy absorption. B Complex Vitamin:  
Take one pill daily or liquid drop form daily; as directed on bottle. Take for Life

## 2019-08-08 NOTE — PROGRESS NOTES
Subjective: Dilip Wise is a 46 y.o. male is now 6 months status post laparoscopic sleeve gastrectomy. Doing well overall. He has lost a total of 88 pounds since surgery. Body mass index is 34.08 kg/m². Has lost 51% of EBW. Currently on a solid food diet without difficulty, reports no issues and denies vomiting and abdominal pain. Taking in 80-90oz water daily. Sources of protein include nuts, cheese. 30 min of activity 7 days a week, including walking. Patient is sleeping 8-9 hours a night on average. Bowel movements are constipated. He states that it is not bad enough to take anything for the constipation. The patient is not having any pain. . The patient is compliant with multivitamins, calcium, Vit D and B12 supplements.      Weight Loss Metrics 8/8/2019 7/24/2019 6/4/2019 4/2/2019 3/5/2019 2/20/2019 2/5/2019   Today's Wt 230 lb 12.8 oz 239 lb 250 lb 3.2 oz 273 lb 280 lb 8 oz 283 lb 6.4 oz 308 lb 14.4 oz   BMI 34.08 kg/m2 35.29 kg/m2 36.95 kg/m2 40.32 kg/m2 41.42 kg/m2 41.85 kg/m2 45.62 kg/m2          Comorbidities:    Hypertension: improved, on Rx, decreased dose, has f/u in two weeks with PCP  Diabetes: resolved  Obstructive Sleep Apnea: improved, stopped CPAP doesn't snore anymore, sleeping 8-9 hours  Hyperlipidemia: unchanged, on statin  Stress Urinary Incontinence: not applicable  Gastroesophageal Reflux: improved, on PPI   Weight related arthropathy:worsened, knees, Dr. Lorena Martinez will do knee replacements        Patient Active Problem List   Diagnosis Code    Bipolar affective disorder, mixed, mild (HCC) F31.61    Alcohol dependence in remission ([address] F10.21    Obesity, morbid ([address] E66.01    Morbid obesity ([address] E66.01    Low testosterone R79.89    Bipolar 1 disorder ([address] F31.9    GERD (gastroesophageal reflux disease) K21.9    Hypertension I10    Tremors of nervous system R25.1    Hypercholesteremia E78.00    Arthritis of both knees M17.0    Arthritis of spine M47.819    Arthritis of both hands M19.041, M19.042    Migraines G43.909    Hx of cardiomyopathy Z86.79    Diastasis recti M62.08    CVA (cerebral vascular accident) ([address] I63.9    Angina at rest ([address] I20.8    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    Arthritis M19.90    Intestinal malabsorption K90.9    S/P laparoscopic sleeve gastrectomy Z98.84    Intertriginous candidiasis B37.2        Past Medical History:   Diagnosis Date    Adverse effect of anesthesia     slow to awaken    Angina at rest [facility])     Arthritis     Bipolar 1 disorder ([address]     Cerebral vasculitis     x 2    Concussion     childhood    CVA (cerebral vascular accident) ([address]     2016- due to vasculitis;october 2018    Diabetes ([facility] 2016    Diastasis recti     Fibromyalgia     GERD (gastroesophageal reflux disease)     Hx of cardiomyopathy     ? reason for diagnosis 10 years ago   [facility] Hypercholesteremia     Hypertension 2016    Intertriginous candidiasis 8/8/2019    Low testosterone     Migraines     Morbid obesity ([address]     Morbid obesity with BMI of 45.0-49.9, adult ([address]     Psychiatric disorder     bipolar    Sleep apnea     cpap     Tremors of nervous system        Past Surgical History:   Procedure Laterality Date    HX APPENDECTOMY      HX ORTHOPAEDIC      CTR both hands    HX ORTHOPAEDIC      cervical fusion    HX ORTHOPAEDIC      TORN MENISCUS    HX TONSILLECTOMY         Current Outpatient Medications   Medication Sig Dispense Refill    aspirin delayed-release 81 mg tablet Take  by mouth daily.  multivitamin with iron (FLINTSTONES) chewable tablet Take 1 Tab by mouth daily.  cyanocobalamin (VITAMIN B-12) 1,000 mcg sublingual tablet Take 1,000 mcg by mouth daily.  cholecalciferol, vitamin D3, (VITAMIN D3) 2,000 unit tab Take  by mouth.  nystatin (MYCOSTATIN) topical cream Apply  to affected area two (2) times a day. 30 g 6    risperiDONE (RISPERDAL) 2 mg tablet Take 1 Tab by mouth nightly.  1 Tab 0    OXcarbazepine (TRILEPTAL) 300 mg tablet Take 1 Tab by mouth two (2) times a day. 60 Tab 1    azaTHIOprine (IMURAN) 50 mg tablet Take 100 mg by mouth.  diclofenac potassium (ZIPSOR) 25 mg capsule Take 1 Cap by mouth.  cetirizine (ZYRTEC) 10 mg tablet Take 1 Tab by mouth.  erenumab-aooe (AIMOVIG AUTOINJECTOR) 70 mg/mL injection by SubCUTAneous route.  nitroglycerin (NITROSTAT) 0.4 mg SL tablet place 1 tablet under the tongue if needed every 5 minutes for lj...  (REFER TO PRESCRIPTION NOTES).  testosterone cypionate (DEPOTESTOTERONE CYPIONATE) 200 mg/mL injection 300 mg by IntraMUSCular route.  temazepam (RESTORIL) 15 mg capsule Take 1 Cap by mouth.  buPROPion XL (WELLBUTRIN XL) 300 mg XL tablet Take 1 Tab by mouth every morning. 30 Tab 5    DULoxetine (CYMBALTA) 60 mg capsule Take 1 Cap by mouth daily. 30 Cap 5    gabapentin (NEURONTIN) 600 mg tablet Take 2 Tabs by mouth two (2) times a day AND 1 Tab daily (after lunch). Indications: Neuropathic Pain 150 Tab 5    QUEtiapine (SEROQUEL) 300 mg tablet Take 1 Tab by mouth nightly. Indications: Bipolar Disorder in Remission 30 Tab 5    traZODone (DESYREL) 100 mg tablet Take 1 Tab by mouth nightly as needed for Sleep. 30 Tab 5    losartan (COZAAR) 50 mg tablet Take 25 mg by mouth daily.  primidone (MYSOLINE) 50 mg tablet Take  by mouth nightly.  B.infantis-B.ani-B.long-B.bifi (PROBIOTIC 4X) 10-15 mg TbEC Take  by mouth daily.  onabotulinumtoxinA (BOTOX INJECTION) by IntraMUSCular route every three (3) months.  Omeprazole delayed release (PRILOSEC D/R) 20 mg tablet Take 40 mg by mouth two (2) times a day.  atorvastatin (LIPITOR) 20 mg tablet Take 40 mg by mouth nightly.  ursodiol (ACTIGALL) 500 mg tablet Take 500 mg by mouth.          Allergies   Allergen Reactions    Sudafed [Pseudoephedrine Hcl] Other (comments)     Pt advised not to take due to stroke in aug '16       Review of Systems:  General - No history or complaints of unexpected fever or chills  Head/Neck - No history or complaints of headache or dizziness  Cardiac - No history or complaints of chest pain, palpitations, or shortness of breath  Pulmonary - No history or complaints of shortness of breath or productive cough  Gastrointestinal - as noted above  Genitourinary - No history or complaints of hematuria/dysuria or renal lithiasis  Musculoskeletal - No history or complaints of joint  muscular weakness  Hematologic - No history of any bleeding episodes  Neurologic - No history or complaints of  migraine headaches or neurologic symptoms    Objective:     Visit Vitals  BP 98/70 (BP 1 Location: Left arm, BP Patient Position: Sitting)   Pulse 70   Temp 97.8 °F (36.6 °C)   Ht 5' 9\" (1.753 m)   Wt 104.7 kg (230 lb 12.8 oz)   SpO2 99%   BMI 34.08 kg/m²       General:  alert, cooperative, no distress, appears stated age   Chest: lungs clear to auscultation, breath sounds equal and symmetric, no rhonchi, rales or wheezes, no accessory muscle use   Cor:   Regular rate and rhythm or without murmur or extra heart sounds   Abdomen: soft, bowel sounds active, non-tender, no masses or organomegaly   Incisions:   Healed well       Assessment:   History of Morbid obesity, status post laparoscopic sleeve gastrectomy. Doing well postoperatively. Constipation - use Miralax prn  Migraines - Continue meds and follow up with Neurology  Hyperlipidemia - Continue meds and follow up with PCP  HTN - Continue meds and follow up with PCP  Seasonal allergies - Continue meds and follow up with PCP  GERD - continue PPI  Arthritis - Continue meds and follow up with Rheumatology  Bipolar d/o - Continue meds and follow up with Psychiatrist    Plan:       1. Discussed patients weight loss goals and dietary choices in relation to goals. 2. Sleep goal is 7-9 hours each night. Patient education given on the effects of sleep deprivation on weight control.   3. Reminded to measure portions, continue high protein, low carbohydrate diet. Reminded to eat regularly, to eat slowly & not to drink with meals. 4. Continue vitamin supplementation  5. Continue current medications and follow up with PCP for management of regimen. 6. Continue cardio exercise and add resistance exercises. 60-90 minutes of aerobic activity 5 days a week and strength training 2 days each week. 7. Encouraged to attend support group   8. I have discussed this plan with patient and they verbalized understanding  9. Follow up in 3 months or sooner if patient has questions, concerns or worsening of condition, if unable to reach our office, patient should report to the ED. 10. Mr. Scott Rosa has a reminder for a \"due or due soon\" health maintenance. I have asked that he contact his primary care provider for a follow-up on this health maintenance.

## 2019-11-05 ENCOUNTER — HOSPITAL ENCOUNTER (OUTPATIENT)
Dept: PREADMISSION TESTING | Age: 49
Discharge: HOME | End: 2019-11-05
Attending: ORTHOPAEDIC SURGERY
Payer: COMMERCIAL

## 2019-11-05 DIAGNOSIS — M17.11 UNILATERAL PRIMARY OSTEOARTHRITIS, RIGHT KNEE: ICD-10-CM

## 2019-11-05 DIAGNOSIS — Z01.818 OTHER SPECIFIED PRE-OPERATIVE EXAMINATION: ICD-10-CM

## 2019-11-05 DIAGNOSIS — Z01.812 BLOOD TESTS PRIOR TO TREATMENT OR PROCEDURE: ICD-10-CM

## 2019-11-05 LAB
ALBUMIN SERPL-MCNC: 3.2 G/DL (ref 3.4–5)
ALBUMIN/GLOB SERPL: 1.3 {RATIO} (ref 0.8–1.7)
ALP SERPL-CCNC: 46 U/L (ref 45–117)
ALT SERPL-CCNC: 45 U/L (ref 16–61)
ANION GAP SERPL CALC-SCNC: 4 MMOL/L (ref 3–18)
APPEARANCE UR: CLEAR
APTT PPP: 25.6 SEC (ref 23–36.4)
AST SERPL-CCNC: 20 U/L (ref 10–38)
ATRIAL RATE: 63 BPM
BACTERIA SPEC CULT: NORMAL
BASOPHILS # BLD: 0.1 K/UL (ref 0–0.1)
BASOPHILS NFR BLD: 1 % (ref 0–2)
BILIRUB SERPL-MCNC: 0.3 MG/DL (ref 0.2–1)
BILIRUB UR QL: NEGATIVE
BUN SERPL-MCNC: 18 MG/DL (ref 7–18)
BUN/CREAT SERPL: 21 (ref 12–20)
CALCIUM SERPL-MCNC: 8.5 MG/DL (ref 8.5–10.1)
CALCULATED P AXIS, ECG09: 43 DEGREES
CALCULATED R AXIS, ECG10: 82 DEGREES
CALCULATED T AXIS, ECG11: 68 DEGREES
CHLORIDE SERPL-SCNC: 106 MMOL/L (ref 100–111)
CO2 SERPL-SCNC: 31 MMOL/L (ref 21–32)
COLOR UR: YELLOW
CREAT SERPL-MCNC: 0.85 MG/DL (ref 0.6–1.3)
DIAGNOSIS, 93000: NORMAL
DIFFERENTIAL METHOD BLD: ABNORMAL
EOSINOPHIL # BLD: 1 K/UL (ref 0–0.4)
EOSINOPHIL NFR BLD: 13 % (ref 0–5)
ERYTHROCYTE [DISTWIDTH] IN BLOOD BY AUTOMATED COUNT: 14.9 % (ref 11.6–14.5)
ERYTHROCYTE [SEDIMENTATION RATE] IN BLOOD: 5 MM/HR (ref 0–20)
GLOBULIN SER CALC-MCNC: 2.4 G/DL (ref 2–4)
GLUCOSE SERPL-MCNC: 79 MG/DL (ref 74–99)
GLUCOSE UR STRIP.AUTO-MCNC: NEGATIVE MG/DL
HBA1C MFR BLD: 5 % (ref 4.2–5.6)
HCT VFR BLD AUTO: 38.1 % (ref 36–48)
HGB BLD-MCNC: 12.2 G/DL (ref 13–16)
HGB UR QL STRIP: NEGATIVE
INR PPP: 1 (ref 0.8–1.2)
KETONES UR QL STRIP.AUTO: NEGATIVE MG/DL
LEUKOCYTE ESTERASE UR QL STRIP.AUTO: NEGATIVE
LYMPHOCYTES # BLD: 2.3 K/UL (ref 0.9–3.6)
LYMPHOCYTES NFR BLD: 30 % (ref 21–52)
MCH RBC QN AUTO: 27.9 PG (ref 24–34)
MCHC RBC AUTO-ENTMCNC: 32 G/DL (ref 31–37)
MCV RBC AUTO: 87.2 FL (ref 74–97)
MONOCYTES # BLD: 0.9 K/UL (ref 0.05–1.2)
MONOCYTES NFR BLD: 11 % (ref 3–10)
NEUTS SEG # BLD: 3.4 K/UL (ref 1.8–8)
NEUTS SEG NFR BLD: 45 % (ref 40–73)
NITRITE UR QL STRIP.AUTO: NEGATIVE
P-R INTERVAL, ECG05: 172 MS
PH UR STRIP: 6 [PH] (ref 5–8)
PLATELET # BLD AUTO: 227 K/UL (ref 135–420)
PMV BLD AUTO: 10.2 FL (ref 9.2–11.8)
POTASSIUM SERPL-SCNC: 4.6 MMOL/L (ref 3.5–5.5)
PROT SERPL-MCNC: 5.6 G/DL (ref 6.4–8.2)
PROT UR STRIP-MCNC: NEGATIVE MG/DL
PROTHROMBIN TIME: 13 SEC (ref 11.5–15.2)
Q-T INTERVAL, ECG07: 410 MS
QRS DURATION, ECG06: 112 MS
QTC CALCULATION (BEZET), ECG08: 419 MS
RBC # BLD AUTO: 4.37 M/UL (ref 4.7–5.5)
SERVICE CMNT-IMP: NORMAL
SODIUM SERPL-SCNC: 141 MMOL/L (ref 136–145)
SP GR UR REFRACTOMETRY: 1.02 (ref 1–1.03)
UROBILINOGEN UR QL STRIP.AUTO: 0.2 EU/DL (ref 0.2–1)
VENTRICULAR RATE, ECG03: 63 BPM
WBC # BLD AUTO: 7.6 K/UL (ref 4.6–13.2)

## 2019-11-05 PROCEDURE — 83036 HEMOGLOBIN GLYCOSYLATED A1C: CPT

## 2019-11-05 PROCEDURE — 85730 THROMBOPLASTIN TIME PARTIAL: CPT

## 2019-11-05 PROCEDURE — 81003 URINALYSIS AUTO W/O SCOPE: CPT

## 2019-11-05 PROCEDURE — 85610 PROTHROMBIN TIME: CPT

## 2019-11-05 PROCEDURE — 87641 MR-STAPH DNA AMP PROBE: CPT

## 2019-11-05 PROCEDURE — 80053 COMPREHEN METABOLIC PANEL: CPT

## 2019-11-05 PROCEDURE — 85652 RBC SED RATE AUTOMATED: CPT

## 2019-11-05 PROCEDURE — 36415 COLL VENOUS BLD VENIPUNCTURE: CPT

## 2019-11-05 PROCEDURE — 85025 COMPLETE CBC W/AUTO DIFF WBC: CPT

## 2019-11-05 PROCEDURE — 93005 ELECTROCARDIOGRAM TRACING: CPT

## 2019-11-14 ENCOUNTER — OFFICE VISIT (OUTPATIENT)
Dept: SURGERY | Age: 49
End: 2019-11-14

## 2019-11-14 VITALS
HEART RATE: 75 BPM | TEMPERATURE: 98.6 F | SYSTOLIC BLOOD PRESSURE: 112 MMHG | BODY MASS INDEX: 33.27 KG/M2 | OXYGEN SATURATION: 100 % | DIASTOLIC BLOOD PRESSURE: 58 MMHG | WEIGHT: 224.6 LBS | HEIGHT: 69 IN

## 2019-11-14 DIAGNOSIS — Z98.84 S/P BARIATRIC SURGERY: ICD-10-CM

## 2019-11-14 DIAGNOSIS — K90.9 INTESTINAL MALABSORPTION, UNSPECIFIED TYPE: Primary | ICD-10-CM

## 2019-11-14 NOTE — PROGRESS NOTES
Subjective: Ric Gil is a 44 y.o. male is now 9 months status post laparoscopic sleeve gastrectomy. Doing well overall. He has lost a total of 94 pounds since surgery. Body mass index is 33.17 kg/m². Has lost 55% of EBW. He is happy with his current weight. Currently on a solid food diet without difficulty, reports no issues and denies vomiting and abdominal pain. Taking in 80-90oz water daily. Sources of protein include nuts, hot dogs, fish, cheese and protein shakes. 30 min of activity 5 days a week, including walking. Patient is sleeping 10  hours a night on average. December 2nd, Dr. Gigi Brunner will be doing a total knee replacement on his right knee. He states that he will have his other knee done at a later date. Bowel movements are constipated. The patient is not having any pain. . The patient is compliant with multivitamins, calcium, Vit D and B12 supplements.      Weight Loss Metrics 11/14/2019 8/8/2019 7/24/2019 6/4/2019 4/2/2019 3/5/2019 2/20/2019   Today's Wt 224 lb 9.6 oz 230 lb 12.8 oz 239 lb 250 lb 3.2 oz 273 lb 280 lb 8 oz 283 lb 6.4 oz   BMI 33.17 kg/m2 34.08 kg/m2 35.29 kg/m2 36.95 kg/m2 40.32 kg/m2 41.42 kg/m2 41.85 kg/m2          Comorbidities:    Hypertension: improved, on Rx, denies lightheadedness or dizziness  Diabetes: resolved  Obstructive Sleep Apnea: resolved  Hyperlipidemia: unchanged, on statin  Stress Urinary Incontinence: not applicable  Gastroesophageal Reflux: improved, on PPI   Weight related arthropathy:worsened, knees, Dr. Gigi Brunner will do knee replacements        Patient Active Problem List   Diagnosis Code    Bipolar affective disorder, mixed, mild (HCC) F31.61    Alcohol dependence in remission ([address] F10.21    Obesity, morbid ([address] E66.01    Morbid obesity ([address] E66.01    Low testosterone R79.89    Bipolar 1 disorder ([address] F31.9    GERD (gastroesophageal reflux disease) K21.9    Hypertension I10    Tremors of nervous system R25.1    Hypercholesteremia E78.00    Arthritis of both knees M17.0    Arthritis of spine M47.819    Arthritis of both hands M19.041, M19.042    Migraines G43.909    Hx of cardiomyopathy Z86.79    Diastasis recti M62.08    CVA (cerebral vascular accident) ([address] I63.9    Angina at rest ([address] I20.8    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    Arthritis M19.90    Intestinal malabsorption K90.9    S/P laparoscopic sleeve gastrectomy Z98.84    Intertriginous candidiasis B37.2        Past Medical History:   Diagnosis Date    Adverse effect of anesthesia     slow to awaken    Angina at rest [facility])     Arthritis     Bipolar 1 disorder ([address]     Cerebral vasculitis     x 2    Concussion     childhood    CVA (cerebral vascular accident) ([address]     2016- due to vasculitis;october 2018    Diabetes ([address] 2016    Diastasis recti     Fibromyalgia     GERD (gastroesophageal reflux disease)     Hx of cardiomyopathy     ? reason for diagnosis 10 years ago   [address] [place] [address] Hypercholesteremia     Hypertension 2016    Intertriginous candidiasis 8/8/2019    Low testosterone     Migraines     Morbid obesity ([address]     Morbid obesity with BMI of 45.0-49.9, adult ([address]     Psychiatric disorder     bipolar    Sleep apnea     cpap     Tremors of nervous system        Past Surgical History:   Procedure Laterality Date    HX APPENDECTOMY      HX ORTHOPAEDIC      CTR both hands    HX ORTHOPAEDIC      cervical fusion    HX ORTHOPAEDIC      TORN MENISCUS    HX TONSILLECTOMY         Current Outpatient Medications   Medication Sig Dispense Refill    buPROPion XL (WELLBUTRIN XL) 300 mg XL tablet Take 1 Tab by mouth every morning. 30 Tab 5    DULoxetine (CYMBALTA) 60 mg capsule Take 1 Cap by mouth daily. 30 Cap 5    gabapentin (NEURONTIN) 600 mg tablet Take 2 Tabs by mouth two (2) times a day AND 1 Tab daily (after lunch). Indications: Neuropathic Pain 150 Tab 5    QUEtiapine (SEROQUEL) 300 mg tablet Take 1 Tab by mouth nightly.  Indications: Bipolar Disorder in Remission 30 Tab 5    risperiDONE (RISPERDAL) 2 mg tablet Take 1 Tab by mouth nightly. 30 Tab 5    OXcarbazepine (TRILEPTAL) 300 mg tablet Take 1 Tab by mouth two (2) times a day. 60 Tab 5    traZODone (DESYREL) 100 mg tablet Take 1 Tab by mouth nightly as needed for Sleep. 30 Tab 5    aspirin delayed-release 81 mg tablet Take  by mouth daily.  multivitamin with iron (FLINTSTONES) chewable tablet Take 1 Tab by mouth daily.  cyanocobalamin (VITAMIN B-12) 1,000 mcg sublingual tablet Take 1,000 mcg by mouth daily.  cholecalciferol, vitamin D3, (VITAMIN D3) 2,000 unit tab Take  by mouth.  nystatin (MYCOSTATIN) topical cream Apply  to affected area two (2) times a day. 30 g 6    azaTHIOprine (IMURAN) 50 mg tablet Take 100 mg by mouth.  diclofenac potassium (ZIPSOR) 25 mg capsule Take 1 Cap by mouth.  cetirizine (ZYRTEC) 10 mg tablet Take 1 Tab by mouth.  erenumab-aooe (AIMOVIG AUTOINJECTOR) 70 mg/mL injection by SubCUTAneous route. Indications: 140 mg      nitroglycerin (NITROSTAT) 0.4 mg SL tablet place 1 tablet under the tongue if needed every 5 minutes for lj...  (REFER TO PRESCRIPTION NOTES).  testosterone cypionate (DEPOTESTOTERONE CYPIONATE) 200 mg/mL injection 300 mg by IntraMUSCular route.  losartan (COZAAR) 50 mg tablet Take 25 mg by mouth daily.  primidone (MYSOLINE) 50 mg tablet Take  by mouth nightly.  B.infantis-B.ani-B.long-B.bifi (PROBIOTIC 4X) 10-15 mg TbEC Take  by mouth daily.  onabotulinumtoxinA (BOTOX INJECTION) by IntraMUSCular route every three (3) months.  Omeprazole delayed release (PRILOSEC D/R) 20 mg tablet Take 40 mg by mouth two (2) times a day.  atorvastatin (LIPITOR) 20 mg tablet Take 40 mg by mouth nightly.  ursodiol (ACTIGALL) 500 mg tablet Take 500 mg by mouth.          Allergies   Allergen Reactions    Sudafed [Pseudoephedrine Hcl] Other (comments)     Pt advised not to take due to stroke in aug '16       Review of Systems:  General - No history or complaints of unexpected fever or chills  Head/Neck - No history or complaints of headache or dizziness  Cardiac - No history or complaints of chest pain, palpitations, or shortness of breath  Pulmonary - No history or complaints of shortness of breath or productive cough  Gastrointestinal - as noted above  Genitourinary - No history or complaints of hematuria/dysuria or renal lithiasis  Musculoskeletal - No history or complaints of joint  muscular weakness  Hematologic - No history of any bleeding episodes  Neurologic - No history or complaints of  migraine headaches or neurologic symptoms    Objective:     Visit Vitals  /58 (BP 1 Location: Left arm, BP Patient Position: Sitting)   Pulse 75   Temp 98.6 °F (37 °C)   Ht 5' 9\" (1.753 m)   Wt 101.9 kg (224 lb 9.6 oz)   SpO2 100%   BMI 33.17 kg/m²       General:  alert, cooperative, no distress, appears stated age   Chest: lungs clear to auscultation, breath sounds equal and symmetric, no rhonchi, rales or wheezes, no accessory muscle use   Cor:   Regular rate and rhythm or without murmur or extra heart sounds   Abdomen: soft, bowel sounds active, non-tender, no masses or organomegaly   Incisions:   healed well       Assessment:   History of Morbid obesity, status post laparoscopic sleeve gastrectomy. Doing well postoperatively. 1. Intertriginous candidiasis - nystatin cream  2. Constipation - list of OTC remedies was given today  3. Essential tremor - Continue meds and follow up with PCP  4. Migraines - Continue meds and follow up with Neurology  5. Hyperlipidemia - Continue meds and follow up with PCP  6. HTN - Continue meds and follow up with PCP  7. Seasonal allergies - Continue meds and follow up with PCP  8. GERD - continue PPI  9. Arthritis - Continue meds and follow up with Rheumatology  10. Bipolar d/o - Continue meds and follow up with Psychiatrist  Plan:       1.  Discussed patients weight loss goals and dietary choices in relation to goals. 2. Sleep goal is 7-9 hours each night. Patient education given on the effects of sleep deprivation on weight control. 3. Reminded to measure portions, continue high protein, low carbohydrate diet. Reminded to eat regularly, to eat slowly & not to drink with meals. 4. Continue vitamin supplementation  5. Continue current medications and follow up with PCP for management of regimen. 6. Continue cardio exercise and add resistance exercises. 60-90 minutes of aerobic activity 5 days a week and strength training 2 days each week. 7. Encouraged to attend support group   8. Lab slip given today. 9. I have discussed this plan with patient and they verbalized understanding  10. Follow up in 3 months or sooner if patient has questions, concerns or worsening of condition, if unable to reach our office, patient should report to the ED. 6. Mr. Mireille Greenwood has a reminder for a \"due or due soon\" health maintenance. I have asked that he contact his primary care provider for a follow-up on this health maintenance.

## 2019-11-14 NOTE — PATIENT INSTRUCTIONS
Patient Instructions 1. Remember hydration goals - minimum of 64 ounces of liquids per day (dehydration is the number one reason for hospital readmission). 2. Sleep 7-9 hours each night to keep your metabolism up. 3. Continue to monitor carbohydrate and protein intake you need a minimum of  Grams of protein daily- remember to keep your total carbohydrates to 50 grams or less per day for best results. 4. To maximize weight loss keep your caloric intake between 800-1,200 calories daily. If you are exercising excessively, such as training for a marathon, you need to keep a food log and meet with the dietician so they can advise you on your diet choices, carbohydrate intake and caloric intake. 5. Continue to work towards exercise goals - 60-90 minutes, 5 times a week minimum of deliberate, aerobic exercise is the ultimate goal with strength training 2 times each week. Refer to [email or web address] for  information. 6. Remember to take vitamins as directed in your handbook. 7. Attend support group the 2nd Thursday of each month. 8. Constipation: Milk of Magnesia is for immediate relief only. Miralax is to be used every day if constipation is a chronic problem. 9. Diarrhea: patients will occasionally develop lactose intolerance after surgery. Check to see if your protein shake has whey in it. If it does try a protein powder or drink that does not have whey and stop all yogurts, cheeses and milks to see if the diarrhea goes away. 10. If you have had labs drawn. We will only call you if you have abnormal results. Otherwise you can access the lab results in \"[facility]\". You will only need the access code the first time you sign on.    
11. Call us at [phone] or email us through [facility]" with questions,     concerns or worsening of condition, we have someone on call 24 hours a day. If you are unable to reach our office, you are to go to your Primary Care Physician or the Emergency Department. NOTE TO GASTRIC BYPASS PATIENTS:  (SAME APPLIES TO GASTRIC SLEEVE PATIENTS FOR FIRST TWO MONTHS) Remember that for the rest of your life, you are not able to take the following: 
- NSAIDs (ibuprofen, goody powder, BC powder, Motrin, Advil, Mobic, Voltaren, Excedrin, etc.) - Steroid pills or injections - Smoke (cigarettes or recreational drugs) - Alcohol Use of any of the above may cause ulcers in your stomach which may perforate causing a medical emergency and surgery. Speak to our medical staff if another medical provider requires you to take steroids or NSAIDs. Supplement Resource Guide Importance of Protein:  
Maintains lean body mass, produces antibodies to fight off infections, heals wounds, minimizes hair loss, helps to give you energy, helps with satiety, and keeping you full between meals. Importance of Calcium: 
Needed for healthy bones and teeth, normal blood clotting, and nervous system functioning, higher risk of osteoporosis and bone disease with non-compliance. Importance of Multivitamins: Many functions. Supply you with extra nutrients that you may be missing from food. May lead to iron deficiency anemia, weakness, fatigue, and many other symptoms with non-compliance. Importance of B Vitamins: 
Important for red blood cell formation, metabolism, energy, and helps to maintain a healthy nervous system. Protein Supplement Liquid diet phase: consume 90-100g protein daily. Once you are eating consume 35-50g protein each day from your protein supplement. 0-3 g fat per serving 0-3 g sugar per serving The body can only absorb 30g of protein at one time, so do not consume more than that at one time. Multi-vitamin Supplement:   
Start immediately after surgery: any complete chewable, such as: [place]s Complete chewables. Avoid [place] sours or gummies. They lack iron and other important nutrients and also have added sugar. Continue with a chewable vitamin or change to an adult complete multivitamin one month after surgery. Menstruating women can take a prenatal vitamin. Make sure it has at least 18 mg iron and 981-862 mcg folic acid Calcium Supplement:  
 
Start taking within one month after surgery. Look for:  
Calcium Citrate Plus D (1500 mg per day) Recommend: Citracal 
 
Avoid chocolate chewable calcium. Can use chewable bariatric or [employer] brand or similar chewable. The body cannot absorb more than 500-600 mg of calcium at one time. Take for Life Vitamin D Take 3,000 international units daily Vitamin B12 B Complex Vitamin Start taking both within one month after surgery. Vitamin B12 (sublingual): Take 1000 mcg of Vitamin B12 three times weekly Must take sublingually (meaning you put it under your tongue) or in a liquid drop form for easy absorption. B Complex Vitamin:  
Take one pill daily or liquid drop form daily; as directed on bottle. Take for Life Replacements for high carb crunchy foods: 
Pork Rinds [employer] ([address] [facility], [address] [place] [address], [employer]) Cha Salcedo, [address] [place] [address], [employer]) Parmesan Crisps [facility] [place]) Low carb bread options: 
647 bread sold on the shelf [employer] and [employer] in the freezer section Cheese wraps at [employer] made by [employer] Constipation: 
Miralax has a delayed effect of 3-5 days OTC Stool softener Smooth Move Tea

## 2019-11-14 NOTE — PROGRESS NOTES
Modesta Leon presents today for   Chief Complaint   Patient presents with    Follow-up     Pt is here today for his follow up       Is someone accompanying this pt? no    Is the patient using any DME equipment during [address]? no    Depression Screening:  3 most recent PHQ Screens 11/14/2019   PHQ Not Done -   Little interest or pleasure in doing things Not at all   Feeling down, depressed, irritable, or hopeless Not at all   Total Score PHQ 2 0       Learning Assessment:  Learning Assessment 8/8/2019   PRIMARY LEARNER Patient   HIGHEST LEVEL OF EDUCATION - PRIMARY LEARNER  SOME COLLEGE   BARRIERS PRIMARY LEARNER NONE   CO-LEARNER CAREGIVER No   BARRIERS CO-LEARNER -   PRIMARY LANGUAGE ENGLISH    NEED No   LEARNER PREFERENCE PRIMARY DEMONSTRATION   LEARNING SPECIAL TOPICS no   ANSWERED BY patient   RELATIONSHIP SELF       Abuse Screening:  Abuse Screening Questionnaire 11/14/2019   Do you ever feel afraid of your partner? N   Are you in a relationship with someone who physically or mentally threatens you? N   Is it safe for you to go home? Y       Fall Risk  No flowsheet data found. Coordination of Care:  1. Have you been to the ER, urgent care clinic since your last visit? Hospitalized since your last visit? no    2. Have you seen or consulted any other health care providers outside of the [address] [place] [address] since your last visit? Include any pap smears or colon screening.  no

## 2019-11-21 PROBLEM — M17.11 PRIMARY LOCALIZED OSTEOARTHRITIS OF RIGHT KNEE: Chronic | Status: ACTIVE | Noted: 2019-11-21

## 2019-11-21 NOTE — H&P
[address] [facility] [address]  History and Physical Exam    Patient: Jessica Antony MRN: [id number]  SSN: [id number]    YOB: 1970  Age: 44 y.o. Sex: male      Subjective:      Chief Complaint: Right knee pain    History of Present Illness:  Patient complains of pain to the right knee and difficulty ambulating, which has progressively worsened over several months. X-rays showed osteoarthritis of the joint. The patient's pain has persisted and progressed despite conservative treatments and therapies. The patient has been previously treated with nsaids. The patient has at this time opted for surgical intervention. Past Medical History:   Diagnosis Date    Adverse effect of anesthesia     slow to awaken    Angina at rest [facility])     Arthritis     Autoimmune disease ([facility]     fibromyalgia    Bipolar 1 disorder ([facility]     Cerebral vasculitis     x 2    Concussion     childhood    CVA (cerebral vascular accident) ([facility]     2016- due to vasculitis;october 2018    Diabetes ([facility] 2016    no meds    Diastasis recti     Fibromyalgia     GERD (gastroesophageal reflux disease)     Hx of cardiomyopathy     ?  reason for diagnosis 10 years ago    Hypercholesteremia     Hypertension 2016    Ill-defined condition 2019    left thumb crush injury, fx of tip of finger    Intertriginous candidiasis 8/8/2019    Low testosterone     Migraines     Primary localized osteoarthritis of right knee 11/21/2019    Psychiatric disorder     bipolar    Sleep apnea     no c-pap    Tremors of nervous system      Past Surgical History:   Procedure Laterality Date    HX APPENDECTOMY      HX GASTRIC BYPASS  2019    HX ORTHOPAEDIC      CTR both hands    HX ORTHOPAEDIC      cervical fusion    HX ORTHOPAEDIC      TORN MENISCUS    HX TONSILLECTOMY       Social History     Occupational History    Not on file   Tobacco Use    Smoking status: Never Smoker    Smokeless tobacco: Never Used Substance and Sexual Activity    Alcohol use: No     Comment: recovering alcoholic    Drug use: No    Sexual activity: Yes     Partners: Female     Prior to Admission medications    Medication Sig Start Date End Date Taking? Authorizing Provider   OTHER Indications: otc generic gas relief pill po prn    Provider, Historical   cephALEXin (KEFLEX) 500 mg capsule Take 500 mg by mouth four (4) times daily. Indications: until finished after hand injury heals    Provider, Historical   buPROPion XL (WELLBUTRIN XL) 300 mg XL tablet Take 1 Tab by mouth every morning. 11/7/19   Bradley Camacho MD   DULoxetine (CYMBALTA) 60 mg capsule Take 1 Cap by mouth daily. 11/7/19   Bradley Camacho MD   gabapentin (NEURONTIN) 600 mg tablet Take 2 Tabs by mouth two (2) times a day AND 1 Tab daily (after lunch). Indications: Neuropathic Pain  Patient taking differently: Take 2 Tabs by mouth two (2) times a day AND 1 Tab daily (after lunch). Indications: bipolar 11/7/19   Bradley Camacho MD   QUEtiapine (SEROQUEL) 300 mg tablet Take 1 Tab by mouth nightly. Indications: Bipolar Disorder in Remission 11/7/19   Bradley Camacho MD   risperiDONE (RISPERDAL) 2 mg tablet Take 1 Tab by mouth nightly. 11/7/19   Bradley Camacho MD   OXcarbazepine (TRILEPTAL) 300 mg tablet Take 1 Tab by mouth two (2) times a day. 11/4/19   Bradley Camacho MD   traZODone (DESYREL) 100 mg tablet Take 1 Tab by mouth nightly as needed for Sleep. 11/4/19   Bradley Camacho MD   aspirin delayed-release 81 mg tablet Take  by mouth daily. Provider, Historical   multivitamin with iron (FLINTSTONES) chewable tablet Take 1 Tab by mouth daily. Provider, Historical   cyanocobalamin (VITAMIN B-12) 1,000 mcg sublingual tablet Take 1,000 mcg by mouth daily. Indications: Rx    Provider, Historical   cholecalciferol, vitamin D3, (VITAMIN D3) 2,000 unit tab Take  by mouth.  Indications: Rx    Provider, Historical   nystatin (MYCOSTATIN) topical cream Apply  to affected area two (2) times a day. 8/8/19   SAVANNAH Ferrer   azaTHIOprine (IMURAN) 50 mg tablet Take 50 mg by mouth. Indications: given post stroke 12/12/18   Provider, Historical   diclofenac potassium (ZIPSOR) 25 mg capsule Take 25 mg by mouth. Provider, Historical   cetirizine (ZYRTEC) 10 mg tablet Take 1 Tab by mouth. 6/25/14   Provider, Historical   erenumab-aooe (AIMOVIG AUTOINJECTOR) 70 mg/mL injection by SubCUTAneous route as needed. Indications: 140 mg 2/18/19   Provider, Historical   nitroglycerin (NITROSTAT) 0.4 mg SL tablet Indications: never had to use 4/25/18   Provider, Historical   testosterone cypionate (DEPOTESTOTERONE CYPIONATE) 200 mg/mL injection 300 mg by IntraMUSCular route. Indications: every 2 weeks 1/30/19   Provider, Historical   losartan (COZAAR) 50 mg tablet Take 25 mg by mouth daily. Provider, Historical   primidone (MYSOLINE) 50 mg tablet Take  by mouth nightly. Provider, Historical   B.infantis-B.ani-B.long-B.bifi (PROBIOTIC 4X) 10-15 mg TbEC Take  by mouth daily. Provider, Historical   onabotulinumtoxinA (BOTOX INJECTION) by IntraMUSCular route every three (3) months. Indications: Last one Mid November    Provider, Historical   Omeprazole delayed release (PRILOSEC D/R) 20 mg tablet Take 40 mg by mouth two (2) times a day. Provider, Historical   atorvastatin (LIPITOR) 20 mg tablet Take 40 mg by mouth nightly. Provider, Historical       Allergies: Allergies   Allergen Reactions    Sudafed [Pseudoephedrine Hcl] Other (comments)     Pt advised not to take due to stroke in aug '16        Review of Systems:  A comprehensive review of systems was negative except for that written in the History of Present Illness. Objective:       Physical Exam:  HEENT: Normocephalic, atraumatic  Lungs:  Clear to auscultation  Heart:   Regular rate and rhythm  Abdomen: Soft  Extremities:  Pain with range of motion of the right knee(s).  Active extension decreased, active flexion decreased. Tenderness generalized. No deformity. No effusion. Positive crepitus. Antalgic gait. Assessment:      Arthritis of the right knee. Plan:       Proceed with scheduled RIGHT TOTAL KNEE ARTHROPLASTY. The various methods of treatment have been discussed with the patient and family. After consideration of risks, benefits, and other options for treatment, the patient has consented to surgical interventions. Questions were answered and preoperative teaching was done by Dr Arcelia Boland.      Signed By: SAVANNAH Sky     November 21, 2019

## 2019-11-21 NOTE — DISCHARGE INSTRUCTIONS
[address] [facility]   Patient Discharge Instructions    Ric Gil / [id number] : 1970    Admitted (Not on file) Discharged: 2019     IF YOU HAVE ANY PROBLEMS ONCE YOU ARE  [facility] FOLLOWING NUMBERS:   Main office number: [phone]    Your follow up appointment to see either Dr. Flavia Rebolledo PA-C, or [facility] PAJOCELYNN as scheduled in 2 weeks. If you are unsure of your appointment date call the office at [phone]. Medication Instructions     · Resume your home medictions as directed, you may have directed not to resume supplements until after your follow up. · A prescription for pain medication has been given   · It is important that you take the medication exactly as they are prescribed. · Keep your medication in the bottles provided by the pharmacist and keep a list of the medication names, dosages, and times to be taken in your wallet. · Do not take other medications without consulting your doctor. What to do at [address] [place] [address] Ave your prehospital diet. If you have excessive nausea or vomitting call your doctor's office. Be sure to maintain adequate fluid intake. Some pain medications may cause constipation. Remember to drink fluids, stay as active as possible, and eat plenty of fiber-rich foods. Begin In-Home Physical Therapy; 3 times a week to work on gait training, range of motion, strengthening, and weight bearing exercises as tolerable. Continue to use your walker or cane when walking. May progress from the walker to a cane to complete total bearing as tolerable. Patient may shower. Wrap incision with plastic wrap/covering to prevent incision from getting wet. Avoid complete immersion. YOUR DRESSING SHOULD BE CHANGED IN 3-5 DAYS BY [facility] NURSE.       When to Call    - Call if you have a temperature greater then 101  - Unable to keep food down  - Are unable to bear any wieght   - Need a pain medication refill     Information obtained by :  I understand that if any problems occur once I am at home I am to contact my physician. I understand and acknowledge receipt of the instructions indicated above.                                                                                                                                            Physician's or R.N.'s Signature                                                                  Date/Time                                                                                                                                              Patient or Representative Signature                                                          Date/Time

## 2019-12-02 ENCOUNTER — APPOINTMENT (OUTPATIENT)
Dept: GENERAL RADIOLOGY | Age: 49
DRG: 470 | End: 2019-12-02
Attending: PHYSICIAN ASSISTANT
Payer: COMMERCIAL

## 2019-12-02 ENCOUNTER — ANESTHESIA EVENT (OUTPATIENT)
Dept: SURGERY | Age: 49
DRG: 470 | End: 2019-12-02
Payer: COMMERCIAL

## 2019-12-02 ENCOUNTER — HOSPITAL ENCOUNTER (INPATIENT)
Age: 49
LOS: 1 days | Discharge: HOME HEALTH | DRG: 470 | End: 2019-12-03
Attending: ORTHOPAEDIC SURGERY | Admitting: ORTHOPAEDIC SURGERY
Payer: COMMERCIAL

## 2019-12-02 ENCOUNTER — ANESTHESIA (OUTPATIENT)
Dept: SURGERY | Age: 49
DRG: 470 | End: 2019-12-02
Payer: COMMERCIAL

## 2019-12-02 DIAGNOSIS — M17.11 PRIMARY LOCALIZED OSTEOARTHRITIS OF RIGHT KNEE: Primary | Chronic | ICD-10-CM

## 2019-12-02 LAB
ABO + RH BLD: NORMAL
BLOOD GROUP ANTIBODIES SERPL: NORMAL
GLUCOSE BLD STRIP.AUTO-MCNC: 117 MG/DL (ref 70–110)
GLUCOSE BLD STRIP.AUTO-MCNC: 74 MG/DL (ref 70–110)
GLUCOSE BLD STRIP.AUTO-MCNC: 99 MG/DL (ref 70–110)
SPECIMEN EXP DATE BLD: NORMAL

## 2019-12-02 PROCEDURE — 74011250636 HC RX REV CODE- 250/636: Performed by: ANESTHESIOLOGY

## 2019-12-02 PROCEDURE — 77030027138 HC INCENT SPIROMETER -A: Performed by: ORTHOPAEDIC SURGERY

## 2019-12-02 PROCEDURE — 74011250637 HC RX REV CODE- 250/637: Performed by: PHYSICIAN ASSISTANT

## 2019-12-02 PROCEDURE — 65270000029 HC RM PRIVATE

## 2019-12-02 PROCEDURE — 77030003666 HC NDL SPINAL BD -A: Performed by: ORTHOPAEDIC SURGERY

## 2019-12-02 PROCEDURE — 77030012508 HC MSK AIRWY LMA AMBU -A: Performed by: NURSE ANESTHETIST, CERTIFIED REGISTERED

## 2019-12-02 PROCEDURE — C9290 INJ, BUPIVACAINE LIPOSOME: HCPCS | Performed by: ORTHOPAEDIC SURGERY

## 2019-12-02 PROCEDURE — 77030033263 HC DRSG MEPILEX 16-48IN BORD MOLN -B: Performed by: ORTHOPAEDIC SURGERY

## 2019-12-02 PROCEDURE — 77030018836 HC SOL IRR NACL ICUM -A: Performed by: ORTHOPAEDIC SURGERY

## 2019-12-02 PROCEDURE — 77030034694 HC SCPL CANADY PLSM DISP USMD -E: Performed by: ORTHOPAEDIC SURGERY

## 2019-12-02 PROCEDURE — 76010000149 HC OR TIME 1 TO 1.5 HR: Performed by: ORTHOPAEDIC SURGERY

## 2019-12-02 PROCEDURE — C1713 ANCHOR/SCREW BN/BN,TIS/BN: HCPCS | Performed by: ORTHOPAEDIC SURGERY

## 2019-12-02 PROCEDURE — 77030013708 HC HNDPC SUC IRR PULS STRY –B: Performed by: ORTHOPAEDIC SURGERY

## 2019-12-02 PROCEDURE — 36415 COLL VENOUS BLD VENIPUNCTURE: CPT

## 2019-12-02 PROCEDURE — 74011000258 HC RX REV CODE- 258: Performed by: ORTHOPAEDIC SURGERY

## 2019-12-02 PROCEDURE — 97116 GAIT TRAINING THERAPY: CPT

## 2019-12-02 PROCEDURE — 82962 GLUCOSE BLOOD TEST: CPT

## 2019-12-02 PROCEDURE — 74011250636 HC RX REV CODE- 250/636: Performed by: ORTHOPAEDIC SURGERY

## 2019-12-02 PROCEDURE — 76942 ECHO GUIDE FOR BIOPSY: CPT | Performed by: ORTHOPAEDIC SURGERY

## 2019-12-02 PROCEDURE — 77030020259 HC SOL INJ SOD CL 0.9% 100ML BG: Performed by: ORTHOPAEDIC SURGERY

## 2019-12-02 PROCEDURE — 0SRC0J9 REPLACEMENT OF RIGHT KNEE JOINT WITH SYNTHETIC SUBSTITUTE, CEMENTED, OPEN APPROACH: ICD-10-PCS | Performed by: ORTHOPAEDIC SURGERY

## 2019-12-02 PROCEDURE — 86900 BLOOD TYPING SEROLOGIC ABO: CPT

## 2019-12-02 PROCEDURE — 76060000033 HC ANESTHESIA 1 TO 1.5 HR: Performed by: ORTHOPAEDIC SURGERY

## 2019-12-02 PROCEDURE — 76210000016 HC OR PH I REC 1 TO 1.5 HR: Performed by: ORTHOPAEDIC SURGERY

## 2019-12-02 PROCEDURE — 74011250637 HC RX REV CODE- 250/637: Performed by: ORTHOPAEDIC SURGERY

## 2019-12-02 PROCEDURE — 77030002934 HC SUT MCRYL J&J -B: Performed by: ORTHOPAEDIC SURGERY

## 2019-12-02 PROCEDURE — 77030031139 HC SUT VCRL2 J&J -A: Performed by: ORTHOPAEDIC SURGERY

## 2019-12-02 PROCEDURE — 74011000250 HC RX REV CODE- 250: Performed by: ORTHOPAEDIC SURGERY

## 2019-12-02 PROCEDURE — C1776 JOINT DEVICE (IMPLANTABLE): HCPCS | Performed by: ORTHOPAEDIC SURGERY

## 2019-12-02 PROCEDURE — 77030038010: Performed by: ORTHOPAEDIC SURGERY

## 2019-12-02 PROCEDURE — 74011250636 HC RX REV CODE- 250/636: Performed by: NURSE ANESTHETIST, CERTIFIED REGISTERED

## 2019-12-02 PROCEDURE — 64447 NJX AA&/STRD FEMORAL NRV IMG: CPT | Performed by: ORTHOPAEDIC SURGERY

## 2019-12-02 PROCEDURE — 74011000250 HC RX REV CODE- 250: Performed by: NURSE ANESTHETIST, CERTIFIED REGISTERED

## 2019-12-02 PROCEDURE — 77030040361 HC SLV COMPR DVT MDII -B: Performed by: ORTHOPAEDIC SURGERY

## 2019-12-02 PROCEDURE — 74011250636 HC RX REV CODE- 250/636: Performed by: PHYSICIAN ASSISTANT

## 2019-12-02 PROCEDURE — 97161 PT EVAL LOW COMPLEX 20 MIN: CPT

## 2019-12-02 PROCEDURE — 74011250637 HC RX REV CODE- 250/637: Performed by: ANESTHESIOLOGY

## 2019-12-02 PROCEDURE — 3E0T3BZ INTRODUCTION OF ANESTHETIC AGENT INTO PERIPHERAL NERVES AND PLEXI, PERCUTANEOUS APPROACH: ICD-10-PCS | Performed by: ANESTHESIOLOGY

## 2019-12-02 PROCEDURE — 77030039760: Performed by: ORTHOPAEDIC SURGERY

## 2019-12-02 PROCEDURE — 77030011628: Performed by: ORTHOPAEDIC SURGERY

## 2019-12-02 PROCEDURE — 77030020782 HC GWN BAIR PAWS FLX 3M -B: Performed by: ORTHOPAEDIC SURGERY

## 2019-12-02 PROCEDURE — 74011000258 HC RX REV CODE- 258: Performed by: NURSE ANESTHETIST, CERTIFIED REGISTERED

## 2019-12-02 PROCEDURE — 77030012893: Performed by: ORTHOPAEDIC SURGERY

## 2019-12-02 PROCEDURE — 73560 X-RAY EXAM OF KNEE 1 OR 2: CPT

## 2019-12-02 PROCEDURE — 77030037713 HC CLOSR DEV INCIS ZIP STRY -B: Performed by: ORTHOPAEDIC SURGERY

## 2019-12-02 DEVICE — COMPONENT TOT KNEE CEM: Type: IMPLANTABLE DEVICE | Site: KNEE | Status: FUNCTIONAL

## 2019-12-02 DEVICE — CEMENT BNE 40GM FULL DOSE PMMA W/O ANTIBIO HI VISC N RADPQ: Type: IMPLANTABLE DEVICE | Site: KNEE | Status: FUNCTIONAL

## 2019-12-02 DEVICE — SIZE 5 TIBIAL TRAY NONPOROUS
Type: IMPLANTABLE DEVICE | Site: KNEE | Status: FUNCTIONAL
Brand: BALANCED KNEE SYSTEM

## 2019-12-02 DEVICE — SIZE 5 11MM TIBIAL INSERT CR
Type: IMPLANTABLE DEVICE | Site: KNEE | Status: FUNCTIONAL
Brand: BKS E-VITALIZE

## 2019-12-02 DEVICE — RT SIZE 4 FEMORAL CR NONPOROUS
Type: IMPLANTABLE DEVICE | Site: KNEE | Status: FUNCTIONAL
Brand: BKS TRIMAX

## 2019-12-02 DEVICE — 32MM PATELLA, VITAMIN E
Type: IMPLANTABLE DEVICE | Site: KNEE | Status: FUNCTIONAL
Brand: BKS E-VITALIZE

## 2019-12-02 RX ORDER — OXYCODONE HYDROCHLORIDE 5 MG/1
5-10 TABLET ORAL
Status: DISCONTINUED | OUTPATIENT
Start: 2019-12-02 | End: 2019-12-03 | Stop reason: HOSPADM

## 2019-12-02 RX ORDER — DOCUSATE SODIUM 100 MG/1
100 CAPSULE, LIQUID FILLED ORAL 2 TIMES DAILY
Status: DISCONTINUED | OUTPATIENT
Start: 2019-12-02 | End: 2019-12-03 | Stop reason: HOSPADM

## 2019-12-02 RX ORDER — CEFADROXIL 500 MG/1
500 CAPSULE ORAL 2 TIMES DAILY
Qty: 10 CAP | Refills: 0 | Status: SHIPPED | OUTPATIENT
Start: 2019-12-02 | End: 2019-12-02 | Stop reason: SDUPTHER

## 2019-12-02 RX ORDER — PRIMIDONE 50 MG/1
50 TABLET ORAL
Status: DISCONTINUED | OUTPATIENT
Start: 2019-12-02 | End: 2019-12-03 | Stop reason: HOSPADM

## 2019-12-02 RX ORDER — KETOROLAC TROMETHAMINE 30 MG/ML
15 INJECTION, SOLUTION INTRAMUSCULAR; INTRAVENOUS EVERY 6 HOURS
Status: DISCONTINUED | OUTPATIENT
Start: 2019-12-02 | End: 2019-12-03 | Stop reason: HOSPADM

## 2019-12-02 RX ORDER — SODIUM CHLORIDE, SODIUM LACTATE, POTASSIUM CHLORIDE, CALCIUM CHLORIDE 600; 310; 30; 20 MG/100ML; MG/100ML; MG/100ML; MG/100ML
125 INJECTION, SOLUTION INTRAVENOUS CONTINUOUS
Status: DISCONTINUED | OUTPATIENT
Start: 2019-12-02 | End: 2019-12-03 | Stop reason: HOSPADM

## 2019-12-02 RX ORDER — SCOLOPAMINE TRANSDERMAL SYSTEM 1 MG/1
1 PATCH, EXTENDED RELEASE TRANSDERMAL
Status: DISCONTINUED | OUTPATIENT
Start: 2019-12-02 | End: 2019-12-02 | Stop reason: HOSPADM

## 2019-12-02 RX ORDER — MIDAZOLAM HYDROCHLORIDE 1 MG/ML
INJECTION, SOLUTION INTRAMUSCULAR; INTRAVENOUS AS NEEDED
Status: DISCONTINUED | OUTPATIENT
Start: 2019-12-02 | End: 2019-12-02 | Stop reason: HOSPADM

## 2019-12-02 RX ORDER — ROPIVACAINE HYDROCHLORIDE 5 MG/ML
INJECTION, SOLUTION EPIDURAL; INFILTRATION; PERINEURAL AS NEEDED
Status: DISCONTINUED | OUTPATIENT
Start: 2019-12-02 | End: 2019-12-02 | Stop reason: HOSPADM

## 2019-12-02 RX ORDER — UREA 10 %
100 LOTION (ML) TOPICAL DAILY
Status: DISCONTINUED | OUTPATIENT
Start: 2019-12-03 | End: 2019-12-03 | Stop reason: HOSPADM

## 2019-12-02 RX ORDER — PROPOFOL 10 MG/ML
INJECTION, EMULSION INTRAVENOUS AS NEEDED
Status: DISCONTINUED | OUTPATIENT
Start: 2019-12-02 | End: 2019-12-02 | Stop reason: HOSPADM

## 2019-12-02 RX ORDER — ACETAMINOPHEN 325 MG/1
650 TABLET ORAL EVERY 6 HOURS
Status: DISCONTINUED | OUTPATIENT
Start: 2019-12-02 | End: 2019-12-03 | Stop reason: HOSPADM

## 2019-12-02 RX ORDER — ACETAMINOPHEN 500 MG
1000 TABLET ORAL
Status: COMPLETED | OUTPATIENT
Start: 2019-12-02 | End: 2019-12-02

## 2019-12-02 RX ORDER — PANTOPRAZOLE SODIUM 40 MG/1
40 TABLET, DELAYED RELEASE ORAL DAILY
Status: DISCONTINUED | OUTPATIENT
Start: 2019-12-02 | End: 2019-12-02

## 2019-12-02 RX ORDER — INSULIN LISPRO 100 [IU]/ML
INJECTION, SOLUTION INTRAVENOUS; SUBCUTANEOUS ONCE
Status: DISCONTINUED | OUTPATIENT
Start: 2019-12-02 | End: 2019-12-02 | Stop reason: HOSPADM

## 2019-12-02 RX ORDER — AZATHIOPRINE 50 MG/1
50 TABLET ORAL
Status: DISCONTINUED | OUTPATIENT
Start: 2019-12-03 | End: 2019-12-03 | Stop reason: HOSPADM

## 2019-12-02 RX ORDER — NYSTATIN 100000 U/G
CREAM TOPICAL 2 TIMES DAILY
Status: DISCONTINUED | OUTPATIENT
Start: 2019-12-02 | End: 2019-12-03 | Stop reason: HOSPADM

## 2019-12-02 RX ORDER — LIDOCAINE HYDROCHLORIDE 20 MG/ML
INJECTION, SOLUTION EPIDURAL; INFILTRATION; INTRACAUDAL; PERINEURAL AS NEEDED
Status: DISCONTINUED | OUTPATIENT
Start: 2019-12-02 | End: 2019-12-02 | Stop reason: HOSPADM

## 2019-12-02 RX ORDER — ONDANSETRON 2 MG/ML
4 INJECTION INTRAMUSCULAR; INTRAVENOUS ONCE
Status: DISCONTINUED | OUTPATIENT
Start: 2019-12-02 | End: 2019-12-02 | Stop reason: HOSPADM

## 2019-12-02 RX ORDER — RISPERIDONE 1 MG/1
2 TABLET, FILM COATED ORAL
Status: DISCONTINUED | OUTPATIENT
Start: 2019-12-02 | End: 2019-12-03 | Stop reason: HOSPADM

## 2019-12-02 RX ORDER — BUTALBITAL, ASPIRIN, AND CAFFEINE 325; 50; 40 MG/1; MG/1; MG/1
1 CAPSULE ORAL
COMMUNITY
End: 2019-12-03

## 2019-12-02 RX ORDER — ASPIRIN 325 MG
325 TABLET ORAL 2 TIMES DAILY
Qty: 42 TAB | Refills: 0 | Status: SHIPPED | OUTPATIENT
Start: 2019-12-02 | End: 2019-12-02 | Stop reason: SDUPTHER

## 2019-12-02 RX ORDER — SODIUM CHLORIDE, SODIUM LACTATE, POTASSIUM CHLORIDE, CALCIUM CHLORIDE 600; 310; 30; 20 MG/100ML; MG/100ML; MG/100ML; MG/100ML
100 INJECTION, SOLUTION INTRAVENOUS CONTINUOUS
Status: DISCONTINUED | OUTPATIENT
Start: 2019-12-02 | End: 2019-12-02 | Stop reason: HOSPADM

## 2019-12-02 RX ORDER — QUETIAPINE FUMARATE 100 MG/1
300 TABLET, FILM COATED ORAL
Status: DISCONTINUED | OUTPATIENT
Start: 2019-12-02 | End: 2019-12-03 | Stop reason: HOSPADM

## 2019-12-02 RX ORDER — TRANEXAMIC ACID 650 1/1
1950 TABLET ORAL ONCE
Status: COMPLETED | OUTPATIENT
Start: 2019-12-02 | End: 2019-12-02

## 2019-12-02 RX ORDER — BUPROPION HYDROCHLORIDE 150 MG/1
300 TABLET ORAL DAILY
Status: DISCONTINUED | OUTPATIENT
Start: 2019-12-03 | End: 2019-12-03 | Stop reason: HOSPADM

## 2019-12-02 RX ORDER — CEFAZOLIN SODIUM/WATER 2 G/20 ML
2 SYRINGE (ML) INTRAVENOUS ONCE
Status: COMPLETED | OUTPATIENT
Start: 2019-12-02 | End: 2019-12-02

## 2019-12-02 RX ORDER — OXCARBAZEPINE 150 MG/1
300 TABLET, FILM COATED ORAL 2 TIMES DAILY
Status: DISCONTINUED | OUTPATIENT
Start: 2019-12-02 | End: 2019-12-03 | Stop reason: HOSPADM

## 2019-12-02 RX ORDER — DIPHENHYDRAMINE HYDROCHLORIDE 50 MG/ML
12.5 INJECTION, SOLUTION INTRAMUSCULAR; INTRAVENOUS
Status: DISCONTINUED | OUTPATIENT
Start: 2019-12-02 | End: 2019-12-03 | Stop reason: HOSPADM

## 2019-12-02 RX ORDER — OXYCODONE AND ACETAMINOPHEN 5; 325 MG/1; MG/1
1 TABLET ORAL
Qty: 60 TAB | Refills: 0 | Status: SHIPPED | OUTPATIENT
Start: 2019-12-02 | End: 2019-12-09

## 2019-12-02 RX ORDER — DEXTROSE MONOHYDRATE 100 MG/ML
125-250 INJECTION, SOLUTION INTRAVENOUS AS NEEDED
Status: DISCONTINUED | OUTPATIENT
Start: 2019-12-02 | End: 2019-12-02 | Stop reason: HOSPADM

## 2019-12-02 RX ORDER — CEFADROXIL 500 MG/1
500 CAPSULE ORAL 2 TIMES DAILY
Qty: 10 CAP | Refills: 0 | Status: SHIPPED | OUTPATIENT
Start: 2019-12-02 | End: 2019-12-07

## 2019-12-02 RX ORDER — NITROGLYCERIN 0.4 MG/1
0.4 TABLET SUBLINGUAL AS NEEDED
Status: DISCONTINUED | OUTPATIENT
Start: 2019-12-02 | End: 2019-12-03 | Stop reason: HOSPADM

## 2019-12-02 RX ORDER — ASPIRIN 325 MG
325 TABLET ORAL 2 TIMES DAILY
Qty: 42 TAB | Refills: 0 | Status: SHIPPED | OUTPATIENT
Start: 2019-12-02 | End: 2019-12-23

## 2019-12-02 RX ORDER — FENTANYL CITRATE 50 UG/ML
25 INJECTION, SOLUTION INTRAMUSCULAR; INTRAVENOUS AS NEEDED
Status: DISCONTINUED | OUTPATIENT
Start: 2019-12-02 | End: 2019-12-02 | Stop reason: HOSPADM

## 2019-12-02 RX ORDER — NALOXONE HYDROCHLORIDE 0.4 MG/ML
0.1 INJECTION, SOLUTION INTRAMUSCULAR; INTRAVENOUS; SUBCUTANEOUS AS NEEDED
Status: DISCONTINUED | OUTPATIENT
Start: 2019-12-02 | End: 2019-12-02 | Stop reason: HOSPADM

## 2019-12-02 RX ORDER — PREGABALIN 50 MG/1
50 CAPSULE ORAL
Status: COMPLETED | OUTPATIENT
Start: 2019-12-02 | End: 2019-12-02

## 2019-12-02 RX ORDER — SODIUM CHLORIDE 0.9 % (FLUSH) 0.9 %
5-40 SYRINGE (ML) INJECTION EVERY 8 HOURS
Status: DISCONTINUED | OUTPATIENT
Start: 2019-12-02 | End: 2019-12-02 | Stop reason: HOSPADM

## 2019-12-02 RX ORDER — ONDANSETRON 2 MG/ML
INJECTION INTRAMUSCULAR; INTRAVENOUS AS NEEDED
Status: DISCONTINUED | OUTPATIENT
Start: 2019-12-02 | End: 2019-12-02 | Stop reason: HOSPADM

## 2019-12-02 RX ORDER — KETAMINE HYDROCHLORIDE 10 MG/ML
INJECTION, SOLUTION INTRAMUSCULAR; INTRAVENOUS AS NEEDED
Status: DISCONTINUED | OUTPATIENT
Start: 2019-12-02 | End: 2019-12-02 | Stop reason: HOSPADM

## 2019-12-02 RX ORDER — TRAZODONE HYDROCHLORIDE 100 MG/1
100 TABLET ORAL
Status: DISCONTINUED | OUTPATIENT
Start: 2019-12-02 | End: 2019-12-03 | Stop reason: HOSPADM

## 2019-12-02 RX ORDER — SODIUM CHLORIDE 9 MG/ML
125 INJECTION, SOLUTION INTRAVENOUS CONTINUOUS
Status: DISCONTINUED | OUTPATIENT
Start: 2019-12-02 | End: 2019-12-03 | Stop reason: HOSPADM

## 2019-12-02 RX ORDER — CELECOXIB 100 MG/1
400 CAPSULE ORAL
Status: COMPLETED | OUTPATIENT
Start: 2019-12-02 | End: 2019-12-02

## 2019-12-02 RX ORDER — DULOXETIN HYDROCHLORIDE 60 MG/1
60 CAPSULE, DELAYED RELEASE ORAL DAILY
Status: DISCONTINUED | OUTPATIENT
Start: 2019-12-03 | End: 2019-12-03 | Stop reason: HOSPADM

## 2019-12-02 RX ORDER — HYDROMORPHONE HYDROCHLORIDE 2 MG/ML
INJECTION, SOLUTION INTRAMUSCULAR; INTRAVENOUS; SUBCUTANEOUS AS NEEDED
Status: DISCONTINUED | OUTPATIENT
Start: 2019-12-02 | End: 2019-12-02 | Stop reason: HOSPADM

## 2019-12-02 RX ORDER — SODIUM CHLORIDE 0.9 % (FLUSH) 0.9 %
5-40 SYRINGE (ML) INJECTION AS NEEDED
Status: DISCONTINUED | OUTPATIENT
Start: 2019-12-02 | End: 2019-12-03 | Stop reason: HOSPADM

## 2019-12-02 RX ORDER — HYDROMORPHONE HYDROCHLORIDE 2 MG/ML
0.5 INJECTION, SOLUTION INTRAMUSCULAR; INTRAVENOUS; SUBCUTANEOUS
Status: DISCONTINUED | OUTPATIENT
Start: 2019-12-02 | End: 2019-12-02 | Stop reason: HOSPADM

## 2019-12-02 RX ORDER — SODIUM CHLORIDE 0.9 % (FLUSH) 0.9 %
5-40 SYRINGE (ML) INJECTION EVERY 8 HOURS
Status: DISCONTINUED | OUTPATIENT
Start: 2019-12-02 | End: 2019-12-03 | Stop reason: HOSPADM

## 2019-12-02 RX ORDER — SODIUM CHLORIDE 9 MG/ML
300 INJECTION, SOLUTION INTRAVENOUS CONTINUOUS
Status: DISPENSED | OUTPATIENT
Start: 2019-12-02 | End: 2019-12-02

## 2019-12-02 RX ORDER — METOCLOPRAMIDE HYDROCHLORIDE 5 MG/ML
10 INJECTION INTRAMUSCULAR; INTRAVENOUS
Status: DISCONTINUED | OUTPATIENT
Start: 2019-12-02 | End: 2019-12-03 | Stop reason: HOSPADM

## 2019-12-02 RX ORDER — GABAPENTIN 300 MG/1
600 CAPSULE ORAL 3 TIMES DAILY
Status: DISCONTINUED | OUTPATIENT
Start: 2019-12-02 | End: 2019-12-03 | Stop reason: HOSPADM

## 2019-12-02 RX ORDER — ATORVASTATIN CALCIUM 20 MG/1
40 TABLET, FILM COATED ORAL
Status: DISCONTINUED | OUTPATIENT
Start: 2019-12-02 | End: 2019-12-03 | Stop reason: HOSPADM

## 2019-12-02 RX ORDER — ZOLPIDEM TARTRATE 5 MG/1
5-10 TABLET ORAL
Status: DISCONTINUED | OUTPATIENT
Start: 2019-12-02 | End: 2019-12-03 | Stop reason: HOSPADM

## 2019-12-02 RX ORDER — MAGNESIUM SULFATE 100 %
4 CRYSTALS MISCELLANEOUS AS NEEDED
Status: DISCONTINUED | OUTPATIENT
Start: 2019-12-02 | End: 2019-12-02 | Stop reason: HOSPADM

## 2019-12-02 RX ORDER — UREA 10 %
1 LOTION (ML) TOPICAL DAILY
Status: DISCONTINUED | OUTPATIENT
Start: 2019-12-03 | End: 2019-12-03 | Stop reason: HOSPADM

## 2019-12-02 RX ORDER — ASPIRIN 325 MG
325 TABLET, DELAYED RELEASE (ENTERIC COATED) ORAL 2 TIMES DAILY
Status: DISCONTINUED | OUTPATIENT
Start: 2019-12-02 | End: 2019-12-03 | Stop reason: HOSPADM

## 2019-12-02 RX ORDER — NALOXONE HYDROCHLORIDE 0.4 MG/ML
0.4 INJECTION, SOLUTION INTRAMUSCULAR; INTRAVENOUS; SUBCUTANEOUS AS NEEDED
Status: DISCONTINUED | OUTPATIENT
Start: 2019-12-02 | End: 2019-12-03 | Stop reason: HOSPADM

## 2019-12-02 RX ORDER — DEXAMETHASONE SODIUM PHOSPHATE 4 MG/ML
8 INJECTION, SOLUTION INTRA-ARTICULAR; INTRALESIONAL; INTRAMUSCULAR; INTRAVENOUS; SOFT TISSUE ONCE
Status: COMPLETED | OUTPATIENT
Start: 2019-12-02 | End: 2019-12-02

## 2019-12-02 RX ORDER — ONDANSETRON 2 MG/ML
4 INJECTION INTRAMUSCULAR; INTRAVENOUS
Status: DISCONTINUED | OUTPATIENT
Start: 2019-12-02 | End: 2019-12-03 | Stop reason: HOSPADM

## 2019-12-02 RX ORDER — SODIUM CHLORIDE 0.9 % (FLUSH) 0.9 %
5-40 SYRINGE (ML) INJECTION AS NEEDED
Status: DISCONTINUED | OUTPATIENT
Start: 2019-12-02 | End: 2019-12-02 | Stop reason: HOSPADM

## 2019-12-02 RX ORDER — LORATADINE 10 MG/1
10 TABLET ORAL DAILY
Status: DISCONTINUED | OUTPATIENT
Start: 2019-12-03 | End: 2019-12-03 | Stop reason: HOSPADM

## 2019-12-02 RX ORDER — ACETAMINOPHEN 500 MG
500 TABLET ORAL
COMMUNITY
End: 2019-12-03

## 2019-12-02 RX ORDER — MELOXICAM 7.5 MG/1
7.5 TABLET ORAL 2 TIMES DAILY
Qty: 28 TAB | Refills: 0 | Status: SHIPPED | OUTPATIENT
Start: 2019-12-02 | End: 2019-12-02 | Stop reason: SDUPTHER

## 2019-12-02 RX ORDER — LANOLIN ALCOHOL/MO/W.PET/CERES
1 CREAM (GRAM) TOPICAL 3 TIMES DAILY
Status: DISCONTINUED | OUTPATIENT
Start: 2019-12-02 | End: 2019-12-03 | Stop reason: HOSPADM

## 2019-12-02 RX ORDER — CEFAZOLIN SODIUM/WATER 2 G/20 ML
2 SYRINGE (ML) INTRAVENOUS EVERY 8 HOURS
Status: COMPLETED | OUTPATIENT
Start: 2019-12-02 | End: 2019-12-03

## 2019-12-02 RX ORDER — MELOXICAM 7.5 MG/1
7.5 TABLET ORAL 2 TIMES DAILY
Qty: 28 TAB | Refills: 0 | Status: SHIPPED | OUTPATIENT
Start: 2019-12-02 | End: 2019-12-16

## 2019-12-02 RX ORDER — CYANOCOBALAMIN (VITAMIN B-12) 500 MCG
2000 TABLET ORAL DAILY
Status: DISCONTINUED | OUTPATIENT
Start: 2019-12-03 | End: 2019-12-03 | Stop reason: HOSPADM

## 2019-12-02 RX ORDER — LOSARTAN POTASSIUM 25 MG/1
25 TABLET ORAL DAILY
Status: DISCONTINUED | OUTPATIENT
Start: 2019-12-03 | End: 2019-12-03 | Stop reason: HOSPADM

## 2019-12-02 RX ORDER — PANTOPRAZOLE SODIUM 40 MG/1
40 TABLET, DELAYED RELEASE ORAL DAILY
Status: DISCONTINUED | OUTPATIENT
Start: 2019-12-02 | End: 2019-12-03 | Stop reason: HOSPADM

## 2019-12-02 RX ORDER — PANTOPRAZOLE SODIUM 40 MG/1
40 TABLET, DELAYED RELEASE ORAL EVERY 24 HOURS
Status: DISCONTINUED | OUTPATIENT
Start: 2019-12-02 | End: 2019-12-03 | Stop reason: HOSPADM

## 2019-12-02 RX ORDER — HYDROCODONE BITARTRATE AND ACETAMINOPHEN 7.5; 325 MG/1; MG/1
TABLET ORAL
COMMUNITY
End: 2019-12-03

## 2019-12-02 RX ADMIN — PREGABALIN 50 MG: 50 CAPSULE ORAL at 11:41

## 2019-12-02 RX ADMIN — FENTANYL CITRATE 25 MCG: 50 INJECTION, SOLUTION INTRAMUSCULAR; INTRAVENOUS at 14:28

## 2019-12-02 RX ADMIN — TRANEXAMIC ACID 1950 MG: 650 TABLET ORAL at 10:44

## 2019-12-02 RX ADMIN — ASPIRIN 325 MG: 325 TABLET, DELAYED RELEASE ORAL at 21:34

## 2019-12-02 RX ADMIN — HYDROMORPHONE HYDROCHLORIDE 1 MG: 2 INJECTION, SOLUTION INTRAMUSCULAR; INTRAVENOUS; SUBCUTANEOUS at 12:50

## 2019-12-02 RX ADMIN — DEXMEDETOMIDINE HYDROCHLORIDE 8 MCG: 100 INJECTION, SOLUTION INTRAVENOUS at 13:19

## 2019-12-02 RX ADMIN — SODIUM CHLORIDE, SODIUM LACTATE, POTASSIUM CHLORIDE, AND CALCIUM CHLORIDE 125 ML/HR: 600; 310; 30; 20 INJECTION, SOLUTION INTRAVENOUS at 10:45

## 2019-12-02 RX ADMIN — DOCUSATE SODIUM 100 MG: 100 CAPSULE, LIQUID FILLED ORAL at 21:35

## 2019-12-02 RX ADMIN — SODIUM CHLORIDE, SODIUM LACTATE, POTASSIUM CHLORIDE, AND CALCIUM CHLORIDE 1000 ML: 600; 310; 30; 20 INJECTION, SOLUTION INTRAVENOUS at 10:45

## 2019-12-02 RX ADMIN — ACETAMINOPHEN 650 MG: 325 TABLET ORAL at 17:29

## 2019-12-02 RX ADMIN — MIDAZOLAM 2 MG: 1 INJECTION INTRAMUSCULAR; INTRAVENOUS at 12:38

## 2019-12-02 RX ADMIN — ONDANSETRON HYDROCHLORIDE 4 MG: 2 INJECTION INTRAMUSCULAR; INTRAVENOUS at 13:34

## 2019-12-02 RX ADMIN — KETOROLAC TROMETHAMINE 15 MG: 30 INJECTION, SOLUTION INTRAMUSCULAR at 17:30

## 2019-12-02 RX ADMIN — ROPIVACAINE HYDROCHLORIDE 30 ML: 5 INJECTION, SOLUTION EPIDURAL; INFILTRATION; PERINEURAL at 12:08

## 2019-12-02 RX ADMIN — ACETAMINOPHEN 1000 MG: 500 TABLET ORAL at 11:41

## 2019-12-02 RX ADMIN — FERROUS SULFATE TAB 325 MG (65 MG ELEMENTAL FE) 325 MG: 325 (65 FE) TAB at 16:01

## 2019-12-02 RX ADMIN — LIDOCAINE HYDROCHLORIDE 100 MG: 20 INJECTION, SOLUTION EPIDURAL; INFILTRATION; INTRACAUDAL; PERINEURAL at 12:45

## 2019-12-02 RX ADMIN — KETAMINE HYDROCHLORIDE 30 MG: 10 INJECTION, SOLUTION INTRAMUSCULAR; INTRAVENOUS at 12:52

## 2019-12-02 RX ADMIN — DEXMEDETOMIDINE HYDROCHLORIDE 8 MCG: 100 INJECTION, SOLUTION INTRAVENOUS at 12:58

## 2019-12-02 RX ADMIN — SODIUM CHLORIDE, SODIUM LACTATE, POTASSIUM CHLORIDE, AND CALCIUM CHLORIDE: 600; 310; 30; 20 INJECTION, SOLUTION INTRAVENOUS at 12:54

## 2019-12-02 RX ADMIN — PRIMIDONE 50 MG: 50 TABLET ORAL at 21:43

## 2019-12-02 RX ADMIN — KETAMINE HYDROCHLORIDE 20 MG: 10 INJECTION, SOLUTION INTRAMUSCULAR; INTRAVENOUS at 12:54

## 2019-12-02 RX ADMIN — QUETIAPINE FUMARATE 300 MG: 100 TABLET ORAL at 21:34

## 2019-12-02 RX ADMIN — RISPERIDONE 2 MG: 1 TABLET ORAL at 21:43

## 2019-12-02 RX ADMIN — GABAPENTIN 600 MG: 300 CAPSULE ORAL at 17:32

## 2019-12-02 RX ADMIN — SODIUM CHLORIDE 500 ML: 900 INJECTION, SOLUTION INTRAVENOUS at 17:54

## 2019-12-02 RX ADMIN — PANTOPRAZOLE SODIUM 40 MG: 40 TABLET, DELAYED RELEASE ORAL at 11:41

## 2019-12-02 RX ADMIN — DEXAMETHASONE SODIUM PHOSPHATE 4 MG: 4 INJECTION, SOLUTION INTRAMUSCULAR; INTRAVENOUS at 11:41

## 2019-12-02 RX ADMIN — OXCARBAZEPINE 300 MG: 150 TABLET, FILM COATED ORAL at 21:34

## 2019-12-02 RX ADMIN — Medication 2 G: at 12:51

## 2019-12-02 RX ADMIN — PANTOPRAZOLE SODIUM 40 MG: 40 TABLET, DELAYED RELEASE ORAL at 16:01

## 2019-12-02 RX ADMIN — GABAPENTIN 600 MG: 300 CAPSULE ORAL at 21:34

## 2019-12-02 RX ADMIN — SODIUM CHLORIDE, SODIUM LACTATE, POTASSIUM CHLORIDE, AND CALCIUM CHLORIDE 125 ML/HR: 600; 310; 30; 20 INJECTION, SOLUTION INTRAVENOUS at 14:56

## 2019-12-02 RX ADMIN — DEXMEDETOMIDINE HYDROCHLORIDE 8 MCG: 100 INJECTION, SOLUTION INTRAVENOUS at 13:10

## 2019-12-02 RX ADMIN — OXYCODONE HYDROCHLORIDE 10 MG: 5 TABLET ORAL at 16:01

## 2019-12-02 RX ADMIN — CELECOXIB 400 MG: 100 CAPSULE ORAL at 11:41

## 2019-12-02 RX ADMIN — MIDAZOLAM 2 MG: 1 INJECTION INTRAMUSCULAR; INTRAVENOUS at 12:05

## 2019-12-02 RX ADMIN — ATORVASTATIN CALCIUM 40 MG: 20 TABLET, FILM COATED ORAL at 21:34

## 2019-12-02 RX ADMIN — Medication 2 G: at 21:34

## 2019-12-02 RX ADMIN — FERROUS SULFATE TAB 325 MG (65 MG ELEMENTAL FE) 325 MG: 325 (65 FE) TAB at 21:34

## 2019-12-02 RX ADMIN — SODIUM CHLORIDE 300 ML/HR: 900 INJECTION, SOLUTION INTRAVENOUS at 16:03

## 2019-12-02 RX ADMIN — PROPOFOL 200 MG: 10 INJECTION, EMULSION INTRAVENOUS at 12:45

## 2019-12-02 NOTE — PERIOP NOTES
Pt. Used restroom in pre-op area with assistance. ,Patient placed on Mayte Paws for a minimum of 30 min in  Preop.

## 2019-12-02 NOTE — ANESTHESIA PREPROCEDURE EVALUATION
Relevant Problems   No relevant active problems       Anesthetic History   No history of anesthetic complications            Review of Systems / Medical History  Patient summary reviewed, nursing notes reviewed and pertinent labs reviewed    Pulmonary        Sleep apnea: No treatment           Neuro/Psych       CVA: no residual symptoms  TIA and psychiatric history (bipolar)     Cardiovascular    Hypertension: well controlled          CAD    Exercise tolerance: >4 METS     GI/Hepatic/Renal     GERD: well controlled           Endo/Other    Diabetes: type 2    Obesity and arthritis     Other Findings              Physical Exam    Airway  Mallampati: II  TM Distance: 4 - 6 cm  Neck ROM: normal range of motion   Mouth opening: Normal     Cardiovascular  Regular rate and rhythm,  S1 and S2 normal,  no murmur, click, rub, or gallop  Rhythm: regular  Rate: normal         Dental  No notable dental hx       Pulmonary  Breath sounds clear to auscultation               Abdominal  GI exam deferred       Other Findings            Anesthetic Plan    ASA: 2  Anesthesia type: general      Post-op pain plan if not by surgeon: peripheral nerve block single    Induction: Intravenous  Anesthetic plan and risks discussed with: Patient

## 2019-12-02 NOTE — DISCHARGE SUMMARY
[address] [address]     DISCHARGE SUMMARY     PATIENT: Anastacio Aguilar     MRN: [id number]   ADMIT DATE: 2019   BILLIN   DISCHARGE DATE:      ATTENDING: Donita Ulloa MD   DICTATING: SAVANNAH Alcantar         ADMISSION DIAGNOSIS: Primary localized osteoarthritis of right knee [M17.11]    DISCHARGE DIAGNOSIS: Status post RIGHT TOTAL KNEE ARTHROPLASTY    HISTORY OF PRESENT ILLNESS: The patient is a 44y.o. year-old male   with ongoing right knee pain secondary to osteoarthritis of his right knee. The patient's pain has persisted and progressed despite conservative treatments and therapies. The patient has at this time opted for surgical intervention. PAST MEDICAL HISTORY:   Past Medical History:   Diagnosis Date    Adverse effect of anesthesia     slow to awaken    Angina [facility] rest [facility])     Arthritis     Autoimmune disease ([facility]     fibromyalgia    Bipolar 1 disorder ([facility]     Cerebral vasculitis     x 2    Concussion     childhood    CVA (cerebral vascular accident) ([facility] [address] [facility]     2016- due to vasculitis;2018    Diabetes ([facility] 2016    no meds    Diastasis recti     Fibromyalgia     GERD (gastroesophageal reflux disease)     Hx of cardiomyopathy     ?  reason for diagnosis 10 years ago    Hypercholesteremia     Hypertension 2016    Ill-defined condition 2019    left thumb crush injury, fx of tip of finger    Intertriginous candidiasis 2019    Low testosterone     Migraines     Primary localized osteoarthritis of right knee 2019    Psychiatric disorder     bipolar    Sleep apnea     no c-pap    Tremors of nervous system        PAST SURGICAL HISTORY:   Past Surgical History:   Procedure Laterality Date    HX APPENDECTOMY      HX GASTRIC BYPASS  2019    sleeve    HX ORTHOPAEDIC      CTR both hands    HX ORTHOPAEDIC      cervical fusion    HX ORTHOPAEDIC      TORN MENISCUS    HX TONSILLECTOMY ALLERGIES:   Allergies   Allergen Reactions    Sudafed [Pseudoephedrine Hcl] Other (comments)     Pt advised not to take due to stroke in aug '16        CURRENT MEDICATIONS:  A list of medications prior to the time of admission include:  Prior to Admission medications    Medication Sig Start Date End Date Taking? Authorizing Provider   HYDROcodone-acetaminophen (NORCO) 7.5-325 mg per tablet Take  by mouth every six (6) hours as needed for Pain. Yes Provider, Historical   butalbital-aspirin-caffeine (FIORINAL) capsule Take 1 Cap by mouth every four (4) hours as needed for Pain. Yes Provider, Historical   acetaminophen/chlorpheniramine (CORICIDIN PO) Take  by mouth daily as needed. Yes Provider, Historical   aspirin-acetaminophen-caffeine (EXCEDRIN ES) 250-250-65 mg per tablet Take 1 Tab by mouth every six (6) hours as needed for Headache. Yes Provider, Historical   acetaminophen (TYLENOL EXTRA STRENGTH) 500 mg tablet Take 500 mg by mouth every six (6) hours as needed for Pain. Yes Provider, Historical   simethicone (KENDELL-SELTZER ANTI-GAS PO) Take  by mouth two (2) times daily as needed. Yes Provider, Historical   calcium carbonate (CALCIUM 600 PO) Take 2 Units by mouth three (3) times daily. Yes Provider, Historical   ferrous fumarate/vit Bcomp,C (SUPER B COMPLEX PO) Take  by mouth every Monday, Wednesday, Friday. Yes Provider, Historical   oxyCODONE-acetaminophen (PERCOCET) 5-325 mg per tablet Take 1 Tab by mouth every four (4) hours as needed for Pain for up to 7 days. Max Daily Amount: 6 Tabs. 12/2/19 12/9/19 Yes Guero Santana PA   aspirin (ASPIRIN) 325 mg tablet Take 1 Tab by mouth two (2) times a day for 21 days. 12/2/19 12/23/19 Yes Guero Santana PA   cefadroxil (DURICEF) 500 mg capsule Take 1 Cap by mouth two (2) times a day for 5 days. 12/2/19 12/7/19 Yes Guero Santana PA   meloxicam (MOBIC) 7.5 mg tablet Take 1 Tab by mouth two (2) times a day for 14 days.  12/2/19 12/16/19 Yes Guero Santana PA   OTHER Indications: otc generic gas relief pill po prn   Yes Provider, Historical   buPROPion XL (WELLBUTRIN XL) 300 mg XL tablet Take 1 Tab by mouth every morning. 11/7/19  Yes Nahum Beckett MD   DULoxetine (CYMBALTA) 60 mg capsule Take 1 Cap by mouth daily. 11/7/19  Yes Nahum Beckett MD   gabapentin (NEURONTIN) 600 mg tablet Take 2 Tabs by mouth two (2) times a day AND 1 Tab daily (after lunch). Indications: Neuropathic Pain  Patient taking differently: Take 2 Tabs by mouth two (2) times a day AND 1 Tab daily (after lunch). Indications: bipolar 11/7/19  Yes Nahum Beckett MD   QUEtiapine (SEROQUEL) 300 mg tablet Take 1 Tab by mouth nightly. Indications: Bipolar Disorder in Remission 11/7/19  Yes Nahum Beckett MD   risperiDONE (RISPERDAL) 2 mg tablet Take 1 Tab by mouth nightly. 11/7/19  Yes Nahum Beckett MD   OXcarbazepine (TRILEPTAL) 300 mg tablet Take 1 Tab by mouth two (2) times a day. 11/4/19  Yes Nahum Beckett MD   traZODone (DESYREL) 100 mg tablet Take 1 Tab by mouth nightly as needed for Sleep. 11/4/19  Yes Nahum Beckett MD   aspirin delayed-release 81 mg tablet Take  by mouth daily. Yes Provider, Historical   multivitamin with iron (FLINTSTONES) chewable tablet Take 1 Tab by mouth daily. Yes Provider, Historical   cyanocobalamin (VITAMIN B-12) 1,000 mcg sublingual tablet Take 1,000 mcg by mouth every Monday, Wednesday, Friday. Indications: Rx   Yes Provider, Historical   cholecalciferol, vitamin D3, (VITAMIN D3) 2,000 unit tab Take  by mouth. Indications: Rx   Yes Provider, Historical   nystatin (MYCOSTATIN) topical cream Apply  to affected area two (2) times a day. 8/8/19  Yes SAVANNAH Chiang   diclofenac potassium (ZIPSOR) 25 mg capsule Take 25 mg by mouth.    Yes Provider, Historical   cetirizine (ZYRTEC) 10 mg tablet Take 1 Tab by mouth. 6/25/14  Yes Provider, Historical   erenumab-aooe (AIMOVIG AUTOINJECTOR) 70 mg/mL injection by SubCUTAneous route as needed. Indications: 140 mg 2/18/19  Yes Provider, Historical   testosterone cypionate (DEPOTESTOTERONE CYPIONATE) 200 mg/mL injection 300 mg by IntraMUSCular route. Indications: every 2 weeks 1/30/19  Yes Provider, Historical   losartan (COZAAR) 50 mg tablet Take 25 mg by mouth daily. Yes Provider, Historical   primidone (MYSOLINE) 50 mg tablet Take  by mouth nightly. Yes Provider, Historical   B.infantis-B.ani-B.long-B.bifi (PROBIOTIC 4X) 10-15 mg TbEC Take  by mouth daily. Yes Provider, Historical   onabotulinumtoxinA (BOTOX INJECTION) by IntraMUSCular route every three (3) months. Indications: Last one Mid November   Yes Provider, Historical   Omeprazole delayed release (PRILOSEC D/R) 20 mg tablet Take 40 mg by mouth two (2) times a day. Yes Provider, Historical   atorvastatin (LIPITOR) 20 mg tablet Take 40 mg by mouth nightly. Yes Provider, Historical   nitroglycerin (NITROSTAT) 0.4 mg SL tablet Indications: never had to use 4/25/18   Provider, Historical       FAMILY HISTORY:   Family History   Problem Relation Age of Onset    Heart Disease Mother     Hypertension Mother     Elevated Lipids Mother     Heart Disease Father     Hypertension Father    [de-identified] Elevated Lipids Father        SOCIAL HISTORY:   Social History     Socioeconomic History    Marital status:      Spouse name: Not on file    Number of children: Not on file    Years of education: Not on file    Highest education level: Not on file   Tobacco Use    Smoking status: Never Smoker    Smokeless tobacco: Never Used   Substance and Sexual Activity    Alcohol use: No     Comment: recovering alcoholic    Drug use: No    Sexual activity: Yes     Partners: Female       REVIEW OF SYSTEMS: All review of systems are negative. PHYSICAL EXAMINATION: For a detailed physical exam, please refer to the patient's chart. HOSPITAL COURSE: The patient was taken to surgery the day of admission.  he underwent a right total knee replacement. Operative course was benign. Estimated blood loss was approximately 150 cc. The patient was taken to the PACU in stable condition and was later taken to the floor in stable condition. During his hospital stay, the patient progressed well with physical therapy and occupational therapy, adherent to instructions. he had been cleared by physical therapy with stair training. he was placed on Aspirin for DVT prophylaxis. his pain has been well controlled with oral pain medications. his vitals have remained stable. he has also remained hemodynamically stable. The patient has been recommended for discharge home. DISCHARGE INSTRUCTIONS: The patient is to be discharged home. he is to continue on his prior medications per the medication reconciliation form, to which we will add:   1. Aspirin 325 mg; 1 tablet po bid x 21 days  2. Mobic 7.5 mg; 1 tablet po bid x 14 days  3. Percocet 5/325 mg; 1 tablets p.o. every 4 hours p.r.n. for pain  4. Duricef 500 mg; 1 tablet p.o. every 12 hours x 5 days    The patient is to continue at home with home physical therapy 3 times a week to work on gait training, range of motion, strengthening, and weightbearing exercises as tolerated on his right lower extremity. The patient is to progress from a walker to a cane to complete total weightbearing as tolerable. The patient is to continue to keep his incision dry. The patient is to followup with Dr. Lurdes Saunders, Modesto Duncan PA-C and/or Jm Shaver PA-C in the office approximately 10-14 days status post for x-rays and further evaluation.     [facility] [address] [place] [address]  12/03/19  7:21 AM

## 2019-12-02 NOTE — INTERVAL H&P NOTE
H&P Update:  Jono Arreola was seen and examined. History and physical has been reviewed. The patient has been examined.  There have been no significant clinical changes since the completion of the originally dated History and Physical.

## 2019-12-02 NOTE — PERIOP NOTES
intermittent low RR, asleep unless stimulated, relaxed in body appearance. States pain level is still 6/10, advised patient RR is low intermittently  And will use comfort measure as present and switch to PO pain meds when on [address], patient verbalized understanding and agreement.

## 2019-12-02 NOTE — OP NOTES
[address] [facility] [address] Medicine  Total Knee Arthroplasty    Patient: Joe Traore MRN: [id number]  SSN: [id number]    YOB: 1970  Age: 44 y.o. Sex: male      Date of Surgery: 12/2/2019   Preoperative Diagnosis: UNILATERAL PRIMARY OSTEOARTHRITIS RIGHT KNEE   Postoperative Diagnosis: UNILATERAL PRIMARY OSTEOARTHRITIS RIGHT KNEE   Location: [facility]  Surgeon: Cara Razo MD  Assistant: Catherine Leija PA-C    Anesthesia: General and adductor canal Nerve Block    Procedure: Total Knee Arthroplasty:   The complexity of the total joint surgery requires the use of a first assistant for positioning, retraction and assistance in closure. Tourniquet Time: Tourniquet not used. Estimated Blood Loss: Less than 100cc     Implants:   Implant Name Type Inv. Item Serial No.  Lot No. LRB No. Used Action   CEMENT BNE SMARTSET HV 40GM -- ORDER IN SETS OF 20 - [id number]  CEMENT BNE SMARTSET HV 40GM -- ORDER IN SETS OF 20  [facility] [employer] [id number] Right 2 Implanted   IMPL CR FEM NP SZ4 RT -- BKS TRIMAX - [id number]  IMPL CR FEM NP SZ4 RT -- BKS TRIMAX  [employer]TH[employer]AK[employer] [id number] Right 1 Implanted   TRAY TIB NP SZ5 -- BALANCED KNEE SYSTEM - [id number]  TRAY TIB NP SZ5 -- BALANCED KNEE SYSTEM  ORTHO DEVELOPMENT YAKELIN [id number] Right 1 Implanted   INSERT TIB CR SZ5 11MM -- BKS E-VITALIZE - [id number]  INSERT TIB CR SZ5 11MM -- BKS E-VITALIZE  ORTHO DEVELOPMENT YAKELIN [id number] Right 1 Implanted   PATELLA 32MM -- BKS E-VITALIZE - [id number]  PATELLA 32MM -- BKS E-VITALIZE  ORTHO DEVELOPMENT YAKELIN [id number] Right 1 Implanted        Specimens: None    Additional Findings: None     Complications: none    Body Mass Index: Body mass index is 33.54 kg/m².     Procedure Detail:  Prior to the surgery the patient was administered a femoral nerve block in the preoperative holding area by the anesthesiologist. Joe Traore was brought to the operating room and positioned on the operating table. He was anesthetized with anesthesia. Intravenous antibiotics were administered. Prior to the incision being made a timeout was called identifying the patient, procedure, operative side, and surgeon. A pneumatic tourniquet was placed about the limb and the right leg was prepped and draped in the usual sterile manner. The tourniquet was not inflated throughout the case. A midline anterior incision made over the knee. The incision was carried down through the subcutaneous tissue to the underlying capsule. A medial parapatellar capsular incision was performed. The medial capsular flap was carefully elevated around to the posterior medial corner protecting the medial collateral ligaments and the fibers. The patella was sized with a caliper, and approximately 10-12 mm was resected with an oscillating saw allowing the patella to be slid into the lateral gutter. It was not everted throughout the case. Our attention was first turned to the distal femur and using intermedullary instrumentation, a 5-degree valgus cut was on the distal end of the femur. The distal end of the femur was sized to a size 4 femoral component. Pins were inserted through the sizer and the corresponding 4-in-1 block was slid into place and pinned for stability. Anterior and posterior and chamfer cuts were made to accommodate the femoral component. The medial and lateral menisci were excised as were the anterior cruciate ligaments. Our attention was then turned to the tibia. Using extramedullary instrumentation, a 3-degree cut was made on the proximal end of the tibia. A spacer block was placed to show gaps had been balanced and a size 5  tibial base plate was placed on the tibia and pinned into place. Intramedullary reaming guide was placed on the tibia and the appropriate reamer was used followed by the keel punch to complete the preparation of the tibia.  A trial femoral component was then impacted on to the distal end of the femur. Trial reduction was then performed with incremental size trial bearing surfaces. The orthodevelopment BKS trimax CR 11mm bearing surface was inserted and allowed for full extension, good medial, lateral stability at 90 degrees of flexion, especially medially. Our attention was then turned to the patella. The patella was sized to a 32mm patella. The guide was pinned, placed over patella, 3 holes were drilled. Trial patella button was inserted and the patella was reduced in the knee. The patella tracked normally using no-touch technique. The trial components were then all removed. The real components were opened on the back. The cut surfaces of the bone were prepared using the PulsaVac lavage. Prior to this, the Aquamantys was used to cauterize any soft tissue bleeding. 40 grams of The [employer] HV cement was mixed. The femoral and tibial components  and patellar component was cemented into place. Excess cement was removed from around the edge of bone using plastic curette. Once the cement had hardened, the knee was placed through range of motion and noted to be stable as mentioned above with the trail components. The wound was dry, therefore no drain was used. The operative knee was injected with 20 cc of exparel. The knee was then soaked with a diluted betadine solution for approximately 3 min. This was then thoroughly irrigated. The capsular layer was closed using a #2 stratafix suture with the knee flexed 90 degrees, while subcutaneous layers were closed using 2-0 Vicryl suture. Finally the skin was closed using Prineo, which were applied in occlusive fashion and sterile bandage applied. All sponge and needle counts were correct. He was taken to the recovery room extubated in stable condition.     Signed By: Keila Rogers MD     December 2, 2019

## 2019-12-02 NOTE — PROGRESS NOTES
1525  - Patient arrives to unit at this time. Admission completed at this time. Patient is A/O x 3. IV to right arm intact and patent. Teds and plexis applied bilateral.  Mepilex dressing to right knee CDI. Denies numbness/tingling. Pedal pulses palpable. Pain 7/10. Fall risk band on patient. Patient was oriented to the room to include use of call bell, meal ordering, and use of incentive spirometer patient able to get up to 3250   on IS. Patient was given explanation of \" up for dinner\" program and has verbalized understanding. Bed in lowest position. Phone and call bell left within reach. Patient educated on need to call for assistance before getting OOB. Plan of care for the day addressed with patient. Educated on pain medication availability and possible side effects. 7905-Nallely Green said Hold narcotics until BP is up and give bolus of 500 NS.

## 2019-12-02 NOTE — PERIOP NOTES
Intake/Output Summary (Last 24 hours) at 12/2/2019 1453  Last data filed at 12/2/2019 1352  Gross per 24 hour   Intake 2000 ml   Output 100 ml   Net 1900 ml

## 2019-12-02 NOTE — ANESTHESIA POSTPROCEDURE EVALUATION
Post-Anesthesia Evaluation and Assessment    Cardiovascular Function/Vital Signs  Visit Vitals  /67   Pulse 75   Temp 36.5 °C (97.7 °F)   Resp 16   Ht 5' 8\" (1.727 m)   Wt 100 kg (220 lb 9 oz)   SpO2 97%   BMI 33.54 kg/m²       Patient is status post Procedure(s):  RIGHT TOTAL KNEE REPLACEMENT. Nausea/Vomiting: Controlled. Postoperative hydration reviewed and adequate. Pain:  Pain Scale 1: FLACC (12/02/19 5757)  Pain Intensity 1: 0 (12/02/19 1457)   Managed. Neurological Status:   Neuro (WDL): Exceptions to WDL (12/02/19 9017)   At baseline. Mental Status and Level of Consciousness: Arousable. Pulmonary Status:   O2 Device: Nasal cannula (12/02/19 6975)   Adequate oxygenation and airway patent. Complications related to anesthesia: None    Post-anesthesia assessment completed. No concerns. Patient has met all discharge requirements.     Signed By: Didi Martins MD    December 2, 2019

## 2019-12-02 NOTE — ANESTHESIA PROCEDURE NOTES
Peripheral Block    Start time: 2019 12:05 PM  End time: 2019 12:08 PM  Performed by: Mechelle Ivey MD  Authorized by: Mechelle Ivey MD       Pre-procedure: Indications: at surgeon's request and post-op pain management    Preanesthetic Checklist: patient identified, risks and benefits discussed, site marked, timeout performed, anesthesia consent given and patient being monitored      Block Type:   Block Type:   Adductor canal  Laterality:  Right  Monitoring:  Standard ASA monitoring, continuous pulse ox, frequent vital sign checks, heart rate, responsive to questions and oxygen  Injection Technique:  Single shot  Procedures: ultrasound guided    Patient Position: supine  Prep: chlorhexidine    Needle Type:  Stimuplex  Needle Gauge:  21 G  Needle Localization:  Anatomical landmarks and ultrasound guidance    Assessment:  Number of attempts:  1  Injection Assessment:  Incremental injection every 5 mL, local visualized surrounding nerve on ultrasound, negative aspiration for blood, no paresthesia, no intravascular symptoms and ultrasound image on chart  Patient tolerance:  Patient tolerated the procedure well with no immediate complications  Simona Boucher   = [id number]  CSN = [id number]

## 2019-12-03 ENCOUNTER — HOME HEALTH ADMISSION (OUTPATIENT)
Dept: HOME HEALTH SERVICES | Facility: HOME HEALTH | Age: 49
End: 2019-12-03
Payer: COMMERCIAL

## 2019-12-03 VITALS
DIASTOLIC BLOOD PRESSURE: 60 MMHG | RESPIRATION RATE: 16 BRPM | OXYGEN SATURATION: 93 % | HEART RATE: 65 BPM | WEIGHT: 220.56 LBS | BODY MASS INDEX: 33.43 KG/M2 | TEMPERATURE: 97.5 F | HEIGHT: 68 IN | SYSTOLIC BLOOD PRESSURE: 107 MMHG

## 2019-12-03 LAB
ANION GAP SERPL CALC-SCNC: 6 MMOL/L (ref 3–18)
BASOPHILS # BLD: 0 K/UL (ref 0–0.1)
BASOPHILS NFR BLD: 0 % (ref 0–2)
BUN SERPL-MCNC: 13 MG/DL (ref 7–18)
BUN/CREAT SERPL: 17 (ref 12–20)
CALCIUM SERPL-MCNC: 7.8 MG/DL (ref 8.5–10.1)
CHLORIDE SERPL-SCNC: 109 MMOL/L (ref 100–111)
CO2 SERPL-SCNC: 26 MMOL/L (ref 21–32)
CREAT SERPL-MCNC: 0.78 MG/DL (ref 0.6–1.3)
DIFFERENTIAL METHOD BLD: ABNORMAL
EOSINOPHIL # BLD: 0.4 K/UL (ref 0–0.4)
EOSINOPHIL NFR BLD: 3 % (ref 0–5)
ERYTHROCYTE [DISTWIDTH] IN BLOOD BY AUTOMATED COUNT: 14.5 % (ref 11.6–14.5)
GLUCOSE BLD STRIP.AUTO-MCNC: 104 MG/DL (ref 70–110)
GLUCOSE SERPL-MCNC: 85 MG/DL (ref 74–99)
HCT VFR BLD AUTO: 33.3 % (ref 36–48)
HGB BLD-MCNC: 10.5 G/DL (ref 13–16)
LYMPHOCYTES # BLD: 2.5 K/UL (ref 0.9–3.6)
LYMPHOCYTES NFR BLD: 22 % (ref 21–52)
MCH RBC QN AUTO: 26.6 PG (ref 24–34)
MCHC RBC AUTO-ENTMCNC: 31.5 G/DL (ref 31–37)
MCV RBC AUTO: 84.3 FL (ref 74–97)
MONOCYTES # BLD: 1 K/UL (ref 0.05–1.2)
MONOCYTES NFR BLD: 9 % (ref 3–10)
NEUTS SEG # BLD: 7.4 K/UL (ref 1.8–8)
NEUTS SEG NFR BLD: 66 % (ref 40–73)
PLATELET # BLD AUTO: 233 K/UL (ref 135–420)
PMV BLD AUTO: 10.5 FL (ref 9.2–11.8)
POTASSIUM SERPL-SCNC: 4 MMOL/L (ref 3.5–5.5)
RBC # BLD AUTO: 3.95 M/UL (ref 4.7–5.5)
SODIUM SERPL-SCNC: 141 MMOL/L (ref 136–145)
WBC # BLD AUTO: 11.3 K/UL (ref 4.6–13.2)

## 2019-12-03 PROCEDURE — 74011250636 HC RX REV CODE- 250/636: Performed by: PHYSICIAN ASSISTANT

## 2019-12-03 PROCEDURE — 97535 SELF CARE MNGMENT TRAINING: CPT

## 2019-12-03 PROCEDURE — 85025 COMPLETE CBC W/AUTO DIFF WBC: CPT

## 2019-12-03 PROCEDURE — 74011250637 HC RX REV CODE- 250/637: Performed by: PHYSICIAN ASSISTANT

## 2019-12-03 PROCEDURE — 97116 GAIT TRAINING THERAPY: CPT

## 2019-12-03 PROCEDURE — 97167 OT EVAL HIGH COMPLEX 60 MIN: CPT

## 2019-12-03 PROCEDURE — 82962 GLUCOSE BLOOD TEST: CPT

## 2019-12-03 PROCEDURE — 74011250637 HC RX REV CODE- 250/637: Performed by: ORTHOPAEDIC SURGERY

## 2019-12-03 PROCEDURE — 80048 BASIC METABOLIC PNL TOTAL CA: CPT

## 2019-12-03 PROCEDURE — 36415 COLL VENOUS BLD VENIPUNCTURE: CPT

## 2019-12-03 RX ADMIN — MULTIPLE VITAMINS W/ MINERALS TAB 1 TABLET: TAB at 08:43

## 2019-12-03 RX ADMIN — OXCARBAZEPINE 300 MG: 150 TABLET, FILM COATED ORAL at 08:43

## 2019-12-03 RX ADMIN — ACETAMINOPHEN 650 MG: 325 TABLET ORAL at 05:52

## 2019-12-03 RX ADMIN — KETOROLAC TROMETHAMINE 15 MG: 30 INJECTION, SOLUTION INTRAMUSCULAR at 05:51

## 2019-12-03 RX ADMIN — BUPROPION HYDROCHLORIDE 300 MG: 150 TABLET, FILM COATED, EXTENDED RELEASE ORAL at 06:24

## 2019-12-03 RX ADMIN — DOCUSATE SODIUM 100 MG: 100 CAPSULE, LIQUID FILLED ORAL at 08:42

## 2019-12-03 RX ADMIN — OXYCODONE HYDROCHLORIDE 10 MG: 5 TABLET ORAL at 08:47

## 2019-12-03 RX ADMIN — DULOXETINE HYDROCHLORIDE 60 MG: 60 CAPSULE, DELAYED RELEASE ORAL at 08:43

## 2019-12-03 RX ADMIN — CHOLECALCIFEROL TAB 10 MCG (400 UNIT) 5 TABLET: 10 TAB at 08:42

## 2019-12-03 RX ADMIN — ACETAMINOPHEN 650 MG: 325 TABLET ORAL at 00:00

## 2019-12-03 RX ADMIN — PANTOPRAZOLE SODIUM 40 MG: 40 TABLET, DELAYED RELEASE ORAL at 08:43

## 2019-12-03 RX ADMIN — ASPIRIN 325 MG: 325 TABLET, DELAYED RELEASE ORAL at 08:43

## 2019-12-03 RX ADMIN — LACTOBACILLUS TAB 1 TABLET: TAB at 08:42

## 2019-12-03 RX ADMIN — GABAPENTIN 600 MG: 300 CAPSULE ORAL at 08:42

## 2019-12-03 RX ADMIN — LORATADINE 10 MG: 10 TABLET ORAL at 08:43

## 2019-12-03 RX ADMIN — KETOROLAC TROMETHAMINE 15 MG: 30 INJECTION, SOLUTION INTRAMUSCULAR at 00:00

## 2019-12-03 RX ADMIN — VITAM B12 100 MCG: 100 TAB at 08:43

## 2019-12-03 RX ADMIN — FERROUS SULFATE TAB 325 MG (65 MG ELEMENTAL FE) 325 MG: 325 (65 FE) TAB at 08:44

## 2019-12-03 RX ADMIN — Medication 2 G: at 05:51

## 2019-12-03 NOTE — ROUTINE PROCESS
Bedshift change report given to Allana Gaucher, RN (oncoming nurse) by Abdon ROSAS RN (offgoing nurse). Report included the following information SBAR, Kardex and MAR.

## 2019-12-03 NOTE — PROGRESS NOTES
Problem: Self Care Deficits Care Plan (Adult)  Goal: *Acute Goals and Plan of Care (Insert Text)  Description  Initial Occupational Therapy Goals (12/3/2019) Within 7 day(s):    1. Patient will perform grooming standing sinkside with Supervision for increased independence in ADLs. 2. Patient will perform UB dressing with setup seated EOB for increased independence with ADLs. 3. Patient will perform LB dressing with setup/Marcia & A/E PRN for increased independence with ADLs. 4. Patient will perform all aspects of toileting with Supervision for increased independence with ADLs. 5. Patient will perform LB ADLs utilizing body mechanics & adaptive strategies with 1 verbal cue for increased safety in ADLs. 6. Patient will independently apply energy conservation techniques with 1 verbal cue(s)for increased independence with ADLs. Outcome: Resolved/Met     OCCUPATIONAL THERAPY EVALUATION/DISCHARGE    Patient: Soheila Collazo (22 y.o. male)  Date: 12/3/2019  Primary Diagnosis: Primary localized osteoarthritis of right knee [M17.11]  Procedure(s) (LRB):  RIGHT TOTAL KNEE REPLACEMENT (Right) 1 Day Post-Op   Precautions:   Fall, WBAT(R TKA)  PLOF: Patient reports grossly independent at baseline w/ intermittent assist from spouse for RLE sock/shoe    ASSESSMENT AND RECOMMENDATIONS:  Based on the objective data described below, the patient presents with RLE decreased ROM and strength affecting LE ADLs. Patient able to utilize LLE side sitting technique and bending forward with RLE for sock donning. Requiring cues and repetition for new learning. Pt will require assist w/ compression hose which patient reports spouse can assist.  Patient otherwise grossly setup to Supervision assist. Pt able to perform toileting w/ cues for safety w/ use of RW and able to perform grooming standing sinkside. Pt left in chair & instructed s/p 1 hour to focus on knee extension.      Education: Reviewed home safety, body mechanics, importance of moving every hour to prevent joint stiffness, role of ice for edema/pain control, Rolling Walker management/safety, and adaptive dressing techniques with patient verbalizing  understanding at this time     Skilled Occupational Therapy is not indicated at this time in this setting. Patient should continue to improve as pain and ROM/strength improves. Discharge Recommendations: Home Health  Further Equipment Recommendations for Discharge: To Be Determined (TBD) at next level of care       SUBJECTIVE:   Patient stated I don't think I'll have any problems.     OBJECTIVE DATA SUMMARY:     Past Medical History:   Diagnosis Date    Adverse effect of anesthesia     slow to awaken    Angina at rest [facility])     Arthritis     Autoimmune disease ([facility] [address]     fibromyalgia    Bipolar 1 disorder ([facility] [address]     Cerebral vasculitis     x 2    Concussion     childhood    CVA (cerebral vascular accident) ([facility] [address]     2016- due to vasculitis;october 2018    Diabetes ([facility] [address] 2016    no meds    Diastasis recti     Fibromyalgia     GERD (gastroesophageal reflux disease)     Hx of cardiomyopathy     ?  reason for diagnosis 10 years ago    Hypercholesteremia     Hypertension 2016    Ill-defined condition 2019    left thumb crush injury, fx of tip of finger    Intertriginous candidiasis 8/8/2019    Low testosterone     Migraines     Primary localized osteoarthritis of right knee 11/21/2019    Psychiatric disorder     bipolar    Sleep apnea     no c-pap    Tremors of nervous system      Past Surgical History:   Procedure Laterality Date    HX APPENDECTOMY      HX GASTRIC BYPASS  2019    sleeve    HX ORTHOPAEDIC      CTR both hands    HX ORTHOPAEDIC      cervical fusion    HX ORTHOPAEDIC      TORN MENISCUS    HX TONSILLECTOMY       Barriers to Learning/Limitations: yes;  cognitive  Compensate with: visual, verbal, tactile, kinesthetic cues/model    Home Situation/Prior level of Function:   Home Situation  Home Environment: Trailer/mobile home  # Steps to Enter: 5  Rails to Enter: Yes  Hand Rails : Bilateral  One/Two Story Residence: One story  Living Alone: No  Support Systems: Spouse/Significant Other/Partner  Patient Expects to be Discharged to[de-identified] Trailer/mobile home  Current DME Used/Available at Home: Cane, straight, Walker, rolling  Tub or Shower Type: Shower  [x]  Right hand dominant   []  Left hand dominant    Cognitive/Behavioral Status:  Neurologic State: Alert; Appropriate for age  Orientation Level: Oriented X4  Cognition: Appropriate decision making; Appropriate for age attention/concentration; Appropriate safety awareness; Follows commands  Safety/Judgement: Awareness of environment; Fall prevention    Skin: R knee incision w/ Mepilex   Edema: compression hose in place & applied ice     Coordination:     Fine Motor Skills-Upper: Left Intact; Right Intact    Gross Motor Skills-Upper: Left Intact; Right Intact    Balance:  Sitting: Intact  Standing: Intact; With support    Strength: BUE  Strength: Generally decreased, functional    Tone & Sensation:BU  Sensation: Intact    Range of Motion:BUE  AROM: Generally decreased, functional    Functional Mobility and Transfers for ADLs:  Bed Mobility:  Supine to Sit: Modified independent  Sit to Supine: Modified independent  Scooting: Modified independent  Transfers:  Sit to Stand: Supervision  Bed to Chair: Supervision; Adaptive equipment   Toilet Transfer : Supervision; Adaptive equipment    ADL Assessment/Intervention:  Feeding: Independent  Oral Facial Hygiene/Grooming: Supervision(standing sinkside)  Bathing: Setup; Additional time  Upper Body Dressing: Setup  Lower Body Dressing: Minimum assistance  Toileting: Supervision    Feeding  Feeding Assistance: Independent  Food to Mouth: Independent  Drink to Mouth:  Independent  Grooming  Position Performed: Standing( sinkside)  Washing Hands: Supervision    Upper Body Dressing Assistance  Pullover Shirt: Set-up  Lower Body Dressing Assistance  Underpants: Set-up  Pants With Elastic Waist: Set-up  Socks: Minimum assistance  Antiembolitic Stockings: Minimum assistance  Slip on Shoes with Back: Set-up  Position Performed: Seated edge of bed    LE Adaptive Equipment:  [x] Adaptive Equipment was not issued due patient nearly able to complete with out use of AE and use of AE would prevent stretching needed to progress with recovery. (Pt able to complete w/ compensatory strategies and able to compensate for socks w/ clothing modifications, but will require assist with Compression hose). Toileting  Toileting Assistance: Supervision  Bladder Hygiene: Supervision  Clothing Management: Supervision    Cognitive Retraining  Problem Solving: Inductive reason; Identifying the task; Identifying the problem;General alternative solution;Deductive reason; Awareness of environment  Safety/Judgement: Awareness of environment; Fall prevention    Pain:  Pain level pre-treatment: 2/10  Pain level post-treatment: 2/10  Pain Intervention(s): Medication administer by Nursing (see MAR); Rest, Ice, Repositioning   Response to intervention: Nurse notified, see doc flow sheet    Activity Tolerance:   Fair. Patient able to stand 2-3 minute(s). Patient able to complete ADLs with intermittent rest breaks. Patient limited by ROM, pain, strength, balance. Please refer to the flowsheet for vital signs taken during this treatment. After treatment:   [x]  Patient left in no apparent distress sitting up in chair  []  Patient sitting on EOB  []  Patient left in no apparent distress in bed  [x]  Call bell left within reach  [x]  Nursing notified  []  Caregiver present  [x]  Ice applied  []  SCD's on while back in bed    COMMUNICATION/EDUCATION:   Communication/Collaboration:  [x]       Role of Occupational Therapy in the acute care setting. [x]      Home safety education was provided and the patient/caregiver indicated understanding.   [x]      Patient/family have participated as able in goal setting and plan of care.  [x]      Patient/family agree to work toward stated goals and plan of care. []      Patient understands intent and goals of therapy, but is neutral about his/her participation. []      Patient is unable to participate in plan of care at this time. Thank you for this referral.  Kassidy Olivares, OTR/L, CSRS  Time Calculation: 31 mins    Eval Complexity: History: HIGH Complexity : Extensive review of history including physical, cognitive and psychosocial history ; Examination: MEDIUM Complexity : 3-5 performance deficits relating to physical, cognitive , or psychosocial skils that result in activity limitations and / or participation restrictions; Decision Making:MEDIUM Complexity : Patient may present with comorbidities that affect occupational performnce.  Miniml to moderate modification of tasks or assistance (eg, physical or verbal ) with assesment(s) is necessary to enable patient to complete evaluation

## 2019-12-03 NOTE — HOME CARE
Met with  patient in room. Verified address and telephone numbers. Explained home care services and rountines. Orders noted and arranged. Left contact information .     Francine Young RN, BSN   [employer]

## 2019-12-03 NOTE — PROGRESS NOTES
Progress Note        Patient: Florence Nguyen MRN: [id number]  SSN: [id number]    YOB: 1970  Age: 44 y.o. Sex: male      1 Day Post-Op status post Procedure(s) (LRB):  RIGHT TOTAL KNEE REPLACEMENT (Right)    Admit Date: 2019  Admit Diagnosis: Primary localized osteoarthritis of right knee [M17.11]    Subjective:      Doing well. No complaints. No SOB. No Chest Pain. No Nausea or Vomiting. No problems eating or voiding. Objective:        Temp (24hrs), Av.1 °F (36.7 °C), Min:97.7 °F (36.5 °C), Max:99.4 °F (37.4 °C)    Body mass index is 33.54 kg/m². Patient Vitals for the past 12 hrs:   BP Temp Pulse Resp SpO2   19 0319 100/55 98 °F (36.7 °C) 70 15 93 %   19 0001 91/54 97.9 °F (36.6 °C) 68 16 95 %   19 2130 90/54 -- -- -- --     Recent Labs     19  0320   HGB 10.5*   HCT 33.3*      K 4.0      CO2 26   BUN 13   CREA 0.78   GLU 85       Physical Exam:  Vital Signs are Stable. No Acute Distress. Alert and Oriented. Negative Homans sign. Toes AROM Full. Neurovascular exam is normal.    Dressing is Clean, Dry, and Intact. Assessment/Plan:     1. Continue oob/rehab  2.  D/c planning    Continue PT/OT  Continue CPM/ROM    Discharge Plan: Home    Signed By: Keila Rogers MD     December 3, 2019

## 2019-12-03 NOTE — PROGRESS NOTES
1925: Bedside report received from Neda Stevenson RN. Assumed care of pt at this time. Pt in bed with no signs of distress. Pt left with call light within reach and encouraged to call for assistance. 1947: Head to toe assessment completed at this time  Patient is A&OX4. Pt denies N/V, chest pain, SOB or distress. Pt is calm and cooperative. Capillary refill less than 3 seconds. Skin is warm and dry with mepilex dressing on right knee and is clean, dry and intact. Lungs are clear bilaterally. Patient instructed on use and reason for incentive spirometer and able to do 3000 ml. Bowel sounds are active. Abdomen is soft and non-tender. CHIQUIS & PLEXI in place bilaterally. Positive dorsalis pedis pulse, sensation, warm. No tingling or numbness to lower extremities. 18 g needle in the right forearm and is clean, dry and intact. Pain scale explained to patient. Reasons for taking PRN meds explained to patient. Patient instructed to call for prn when needed. Pain level is 4. Patient was oriented to call bell and bed function. Will continue to monitor. Shift summary: Patient had uneventful shift. Patient ambulated with assistance using walker. Pain remained well-controlled with medication. No issues/concerns at this time.

## 2019-12-03 NOTE — PROGRESS NOTES
Problem: Mobility Impaired (Adult and Pediatric)  Goal: *Acute Goals and Plan of Care (Insert Text)  Description  Physical Therapy Goals  Initiated 12/2/2019  to be met within 1-2 days  STG's:  1. Bed mobility:  Supine to/from sit with S in prep for ADL activity. 2. Transfers:  Sit to/from stand with S/LRAD in prep for gait. LTG's  1. Ambulation:  Ambulate 150 ft.with S/LRAD for home mobility at discharge. 2. Step Negotiation: Ascend/Descend 3-5 steps with CGA with HR for home navigation as indicated. 3. Patient Education:  S/Independent with HEP for home performance accurately at discharge. Outcome: Progressing Towards Goal  PHYSICAL THERAPY EVALUATION    Patient: Linden Winston (74 y.o. male)  Date: 12/2/2019  Primary Diagnosis: Primary localized osteoarthritis of right knee [M17.11]  Procedure(s) (LRB):  RIGHT TOTAL KNEE REPLACEMENT (Right) Day of Surgery   Precautions:Fall, WBAT    ASSESSMENT :  Based on the objective data described below, the patient presents with decreased ROM/motor performance R knee, decreased independence in functional mobility with regard to bed mobility, transfers, gait following R TKR. Reports pain 4/10 pre/post session. Pt supine >sit CGA and transfers sit >stand CGA. Able to participate in GT/RW/. Pt demonstrates antalgic gait pattern with decreased step length and foot clearance and reciprocal gt. .  No knee buckling noted. Pt returned to room and left supine with SCD's/ice in place, and all needs in reach. Nurse KJ notified. Recommend HHPT for follow up physical therapy upon discharge to reach maximal level of independence/safety with functional mobility. Pt Education: Role of physical therapy in acute care setting, fall prevention and safety/technique during functional mobility tasks      Patient will benefit from skilled intervention to address the above impairments.   Patients rehabilitation potential is considered to be Good  Factors which may influence rehabilitation potential include:   [x]         None noted  []         Mental ability/status  []         Medical condition  []         Home/family situation and support systems  []         Safety awareness  []         Pain tolerance/management  []         Other:      PLAN :  Recommendations and Planned Interventions:  [x]           Bed Mobility Training             []    Neuromuscular Re-Education  [x]           Transfer Training                   []    Orthotic/Prosthetic Training  [x]           Gait Training                          []    Modalities  [x]           Therapeutic Exercises          []    Edema Management/Control  [x]           Therapeutic Activities            [x]    Patient and Family Training/Education  []           Other (comment):    Frequency/Duration: Patient will be followed by physical therapy 2 times per day to address goals. Discharge Recommendations:Home Health   Further Equipment Recommendations for Discharge: N/A     SUBJECTIVE:   Patient stated Pretty good.     OBJECTIVE DATA SUMMARY:     Past Medical History:   Diagnosis Date    Adverse effect of anesthesia     slow to awaken    Angina at rest [facility])     Arthritis     Autoimmune disease ([facility] [address]     fibromyalgia    Bipolar 1 disorder ([facility] [address]     Cerebral vasculitis     x 2    Concussion     childhood    CVA (cerebral vascular accident) ([facility] [address]     2016- due to vasculitis;october 2018    Diabetes ([facility] [address] 2016    no meds    Diastasis recti     Fibromyalgia     GERD (gastroesophageal reflux disease)     Hx of cardiomyopathy     ?  reason for diagnosis 10 years ago    Hypercholesteremia     Hypertension 2016    Ill-defined condition 2019    left thumb crush injury, fx of tip of finger    Intertriginous candidiasis 8/8/2019    Low testosterone     Migraines     Primary localized osteoarthritis of right knee 11/21/2019    Psychiatric disorder     bipolar    Sleep apnea     no c-pap    Tremors of nervous system      Past Surgical History: Procedure Laterality Date    HX APPENDECTOMY      HX GASTRIC BYPASS  2019    sleeve    HX ORTHOPAEDIC      CTR both hands    HX ORTHOPAEDIC      cervical fusion    HX ORTHOPAEDIC      TORN MENISCUS    HX TONSILLECTOMY       Barriers to Learning/Limitations: None  Compensate with: N/A  Prior Level of Function/Home Situation: amb without AD, SPC  Home Situation  Home Environment: Trailer/mobile home  # Steps to Enter: 5  Rails to Enter: Yes  Hand Rails : Bilateral  One/Two Story Residence: One story  Living Alone: No  Support Systems: Spouse/Significant Other/Partner  Patient Expects to be Discharged to[de-identified] Trailer/mobile home  Current DME Used/Available at Home: Cane, straight, Walker, rolling  Tub or Shower Type: Shower  Critical Behavior:  Neurologic State: Alert; Appropriate for age  Orientation Level: Oriented X4  Cognition: Follows commands  Psychosocial  Patient Behaviors: Calm; Cooperative  Purposeful Interaction: Yes  Pt Identified Daily Priority: Clinical issues (comment)  Caritas Process: Nurture loving kindness  Caring Interventions: Reassure  Reassure: Informing; Therapeutic listening  Skin Condition/Temp: Dry  Skin Integrity: Incision (comment)(knee)  Skin Integumentary  Skin Color: Appropriate for ethnicity  Skin Condition/Temp: Dry  Skin Integrity: Incision (comment)(knee)  Turgor: Non-tenting  Hair Growth: Present  Varicosities: Absent  Strength:    Strength: Generally decreased, functional(R LE)  Tone & Sensation:   Sensation: Intact  Range Of Motion:  AROM: Generally decreased, functional(R LE)  Functional Mobility:  Bed Mobility:  Supine to Sit: Contact guard assistance  Sit to Supine: Contact guard assistance  Scooting: Contact guard assistance  Transfers:  Sit to Stand: Contact guard assistance  Stand to Sit: Contact guard assistance  Balance:   Sitting: Intact  Standing: Intact; With support  Ambulation/Gait Training:  Distance (ft): 130 Feet (ft)  Assistive Device: Gait belt;Walker, rolling  Ambulation - Level of Assistance: Contact guard assistance  Gait Description (WDL): Exceptions to WDL  Gait Abnormalities: Antalgic;Decreased step clearance  Right Side Weight Bearing: As tolerated  Base of Support: Widened  Stance: Right decreased  Speed/Kori: Pace decreased (<100 feet/min)  Step Length: Right shortened;Left shortened  Swing Pattern: Right asymmetrical;Left asymmetrical  Interventions: Safety awareness training; Tactile cues; Verbal cues; Visual/Demos  Therapeutic Exercises:   Issued HEP per protocol  Pain:  Pain Scale 1: Numeric (0 - 10)  Pain Intensity 1: 4  Pain Location 1: Knee  Pain Orientation 1: Right  Pain Description 1: Aching  Pain Intervention(s) 1: Medication (see MAR)  Activity Tolerance:     Please refer to the flowsheet for vital signs taken during this treatment. After treatment:   []         Patient left in no apparent distress sitting up in chair  []         Patient left in no apparent distress in bed  []         Call bell left within reach  []         Nursing notified  []         Caregiver present  []         Bed alarm activated    COMMUNICATION/EDUCATION:   [x]         Fall prevention education was provided and the patient/caregiver indicated understanding. [x]         Patient/family have participated as able in goal setting and plan of care. [x]         Patient/family agree to work toward stated goals and plan of care. []         Patient understands intent and goals of therapy, but is neutral about his/her participation. []         Patient is unable to participate in goal setting and plan of care.     Eval Complexity: History: HIGH Complexity :3+ comorbidities / personal factors will impact the outcome/ POC Exam:LOW Complexity : 1-2 Standardized tests and measures addressing body structure, function, activity limitation and / or participation in recreation  Presentation: LOW Complexity : Stable, uncomplicated  Clinical Decision Making:Low Complexity amb >30ft with RW/CGA  Overall Complexity:LOW     Thank you for this referral.  Jenelle Melendez, PT   Time Calculation: 25 mins

## 2019-12-03 NOTE — ROUTINE PROCESS
Bedside and Verbal shift change report given to Cesar Cordova RN by Abel Reid RN. Report included the following information SBAR, Kardex, OR Summary, Intake/Output and MAR.

## 2019-12-03 NOTE — PROGRESS NOTES
Problem: Mobility Impaired (Adult and Pediatric)  Goal: *Acute Goals and Plan of Care (Insert Text)  Description  Physical Therapy Goals  Initiated 12/2/2019  to be met within 1-2 days  STG's:  1. Bed mobility:  Supine to/from sit with S in prep for ADL activity. 2. Transfers:  Sit to/from stand with S/LRAD in prep for gait. LTG's  1. Ambulation:  Ambulate 150 ft.with S/LRAD for home mobility at discharge. 2. Step Negotiation: Ascend/Descend 3-5 steps with CGA with HR for home navigation as indicated. 3. Patient Education:  S/Independent with HEP for home performance accurately at discharge. Outcome: Resolved/Met    PHYSICAL THERAPY TREATMENT/DISCHARGE    Patient: Binta Portillo (1 y.o. male)  Date: 12/3/2019  Diagnosis: Primary localized osteoarthritis of right knee [M17.11]   Primary localized osteoarthritis of right knee  Procedure(s) (LRB):  RIGHT TOTAL KNEE REPLACEMENT (Right) 1 Day Post-Op  Precautions: Fall, WBAT  Chart, physical therapy assessment, plan of care and goals were reviewed. ASSESSMENT:  Pt up in chair on PT arrival and eager to participate with PT session. At completion of session, pt demonstrates knowledge and accurate performance of HEP, Transfers and gt with RW using reciprocal gt pattern WBAT R LE with S, and bed mobility with modified indendence. Progression toward goals:  [x]      Goals met  []      Improving appropriately and progressing toward goals  []      Improving slowly and progressing toward goals  []      Not making progress toward goals and plan of care will be adjusted     PLAN:  Patient will be discharged from physical therapy at this time.   Rationale for discharge:  [x] Goals Achieved  [] [address]  [] Patient not participating in therapy  [] Other:  Discharge Recommendations:  Home Health  Further Equipment Recommendations for Discharge:  N/A     SUBJECTIVE:   Patient stated .    OBJECTIVE DATA SUMMARY:   Critical Behavior:  Neurologic State: Appropriate for age, Alert  Orientation Level: Oriented X4  Cognition: Appropriate decision making  Functional Mobility Training:  Bed Mobility:  Supine to Sit: Modified independent  Sit to Supine: Modified independent  Scooting: Modified independent  Transfers:  Sit to Stand: Supervision  Stand to Sit: Supervision  Balance:  Sitting: Intact  Standing: Intact; With support  Ambulation/Gait Training:  Distance (ft): 150 Feet (ft)  Assistive Device: Gait belt;Walker, rolling  Ambulation - Level of Assistance: Supervision  Gait Abnormalities: Antalgic;Decreased step clearance  Right Side Weight Bearing: As tolerated  Speed/Kori: Pace decreased (<100 feet/min)  Swing Pattern: Right asymmetrical;Left asymmetrical  Interventions: Safety awareness training;Verbal cues; Visual/Demos  Stairs:  Number of Stairs Trained: 9  Stairs - Level of Assistance: Contact guard assistance   Rail Use: Both(and R ascending; L descending)  Therapeutic Exercises:   Per HEP protocol: AP, AC, LAQ, QS, HS  Pain:  Pain Scale 1: Numeric (0 - 10)  Pain Intensity 1: 4  Pain Location 1: Knee  Pain Orientation 1: Right  Pain Description 1: Aching  Pain Intervention(s) 1: Medication (see MAR)  Activity Tolerance:   Good   Please refer to the flowsheet for vital signs taken during this treatment.   After treatment:   [x] Patient left in no apparent distress sitting up EOB  [] Patient left in no apparent distress in bed  [x] Call bell left within reach  [x] Nursing notified  [x] Caregiver present  [] Bed alarm activated  Mandy Keating PT   Time Calculation: 16 mins

## 2019-12-03 NOTE — PROGRESS NOTES
Problem: Falls - Risk of  Goal: *Absence of Falls  Description  Document [place] Fall Risk and appropriate interventions in the flowsheet.   Outcome: Progressing Towards Goal  Note: Fall Risk Interventions:  Mobility Interventions: Patient to call before getting OOB, PT Consult for mobility concerns, PT Consult for assist device competence, Utilize walker, cane, or other assistive device         Medication Interventions: Patient to call before getting OOB, Teach patient to arise slowly    Elimination Interventions: Call light in reach, Urinal in reach, Patient to call for help with toileting needs

## 2019-12-03 NOTE — PROGRESS NOTES
Transition of Care ([facility]) Plan:     Chart reviewed, met with pt and spouse in room prior to discharge home. FOC offered, pt chose [facility] [phone] for follow up; referral placed with CMS. Pt has RW for home. Transition of Care Plan:     The Plan for Transition of Care is related to the following treatment goals: [facility] SN/PT    The Patient and/or patient representative was provided with a choice of provider and agrees  with the discharge plan. Yes [x] No []    A Freedom of choice list was provided with basic dialogue that supports the patient's individualized plan of care/goals and shares the quality data associated with the providers. Yes [x] No []        [facility] Transportation:   How is patient being transported at discharge? Family/Friend      When? Once cleared by Therapy between 12-2pm     Is transport scheduled? N/A      Follow-up appointment and transportation:   PCP/Specialist?  See AVS for Appointment         Who is transporting to the follow-up appointment? Family/Friend      Is transport for follow up appointment scheduled? N/A    Communication plan (with patient/family): Who is being called? Patient or Next of Kin? Responsible party? Patient      What number(s) is to be used? See Facesheet      What service provider is calling for [facility] services? When are they calling? 24-48 hours following discharge    Readmission Risk? (Green/Low; Yellow/Moderate; Red/High):  Green    Care Management Interventions  PCP Verified by CM:  Yes  Transition of Care Consult (CM Consult): [address]: Yes  Discharge Durable Medical Equipment: No  Physical Therapy Consult: Yes  Occupational Therapy Consult: Yes  Current Support Network: Lives with Spouse  Confirm Follow Up Transport: Family  Plan discussed with Pt/Family/Caregiver: Yes  Freedom of Choice Offered: Yes  Discharge Location  Discharge Placement: Home with home health

## 2019-12-03 NOTE — PROGRESS NOTES
Occupational Therapy Evaluation/Treatment Attempt    Chart reviewed. Attempted Occupational Therapy Evaluation/Treatment, however, patient unable to be seen due to:  []  Nausea/vomiting  []  Eating  []  Pain  []  Patient too lethargic  []  Off Unit for testing/procedure  []  Dialysis treatment in progress   []  Telemetry Results  [x]  Other: Pt sleeping soundly/snoring    Will follow up later as patient's schedule allows.    Thank you for this referral.  Kassidy Olivares, OTR/L, CSRS

## 2019-12-03 NOTE — PROGRESS NOTES
7439  Assumed care of pt at this time. Assessment complete. Pt alert and oriented x 4 sitting up to edge of bed for breakfast. Denies SOB and chest pain. Pt lungs clear bilaterally. Cap refill  less than 3 seconds. Pt denies numbness and tingling to all extremities. Stated pain 6/10. Pt has 18 G IV to L forearm. Pt has mepilex silver dressing to right knee CDI. Plexis and TEDs in place to BLE. Pt encouraged to continue use of IS. Pt verbalized understanding. Ice pack applied. Call light and possessions within reach. Bed in low position. Will continue to monitor. Fall risk arm band in place    0847  Pt stated pain is 5/10. Medicated with 10mg of oxycodone prior to working with PT    1020  Pt ambulated in hallway with Nurse    [phone]  Pt has cleared PT  Dual AVS reviewed with KARINA Lizarraga RN. All medications reviewed individually with patient. Opportunities for questions and concerns provided. Patient to be discharged via (mode of transport ie. Car, ambulance or air transport) car. Patient's arm band appropriately discarded.     IV removed  D/c paper signed copied and placed in chart

## 2019-12-04 ENCOUNTER — HOME CARE VISIT (OUTPATIENT)
Dept: SCHEDULING | Facility: HOME HEALTH | Age: 49
End: 2019-12-04
Payer: COMMERCIAL

## 2019-12-04 VITALS
HEART RATE: 74 BPM | OXYGEN SATURATION: 96 % | DIASTOLIC BLOOD PRESSURE: 68 MMHG | SYSTOLIC BLOOD PRESSURE: 110 MMHG | TEMPERATURE: 98.8 F

## 2019-12-04 PROCEDURE — G0299 HHS/HOSPICE OF RN EA 15 MIN: HCPCS

## 2019-12-04 PROCEDURE — G0151 HHCP-SERV OF PT,EA 15 MIN: HCPCS

## 2019-12-04 PROCEDURE — 400013 HH SOC

## 2019-12-05 ENCOUNTER — HOME CARE VISIT (OUTPATIENT)
Dept: HOME HEALTH SERVICES | Facility: HOME HEALTH | Age: 49
End: 2019-12-05
Payer: COMMERCIAL

## 2019-12-05 VITALS
DIASTOLIC BLOOD PRESSURE: 68 MMHG | SYSTOLIC BLOOD PRESSURE: 110 MMHG | HEART RATE: 74 BPM | TEMPERATURE: 97.6 F | OXYGEN SATURATION: 96 % | RESPIRATION RATE: 16 BRPM | HEIGHT: 69 IN | BODY MASS INDEX: 33.33 KG/M2 | WEIGHT: 225 LBS

## 2019-12-05 PROCEDURE — G0157 HHC PT ASSISTANT EA 15: HCPCS

## 2019-12-06 ENCOUNTER — HOME CARE VISIT (OUTPATIENT)
Dept: HOME HEALTH SERVICES | Facility: HOME HEALTH | Age: 49
End: 2019-12-06
Payer: COMMERCIAL

## 2019-12-06 ENCOUNTER — HOME CARE VISIT (OUTPATIENT)
Dept: SCHEDULING | Facility: HOME HEALTH | Age: 49
End: 2019-12-06
Payer: COMMERCIAL

## 2019-12-06 PROCEDURE — G0157 HHC PT ASSISTANT EA 15: HCPCS

## 2019-12-09 ENCOUNTER — HOME CARE VISIT (OUTPATIENT)
Dept: SCHEDULING | Facility: HOME HEALTH | Age: 49
End: 2019-12-09
Payer: COMMERCIAL

## 2019-12-09 PROCEDURE — G0157 HHC PT ASSISTANT EA 15: HCPCS

## 2019-12-09 PROCEDURE — G0299 HHS/HOSPICE OF RN EA 15 MIN: HCPCS

## 2019-12-10 VITALS
RESPIRATION RATE: 14 BRPM | HEART RATE: 80 BPM | OXYGEN SATURATION: 97 % | TEMPERATURE: 97.7 F | SYSTOLIC BLOOD PRESSURE: 108 MMHG | DIASTOLIC BLOOD PRESSURE: 67 MMHG

## 2019-12-12 ENCOUNTER — HOME CARE VISIT (OUTPATIENT)
Dept: SCHEDULING | Facility: HOME HEALTH | Age: 49
End: 2019-12-12
Payer: COMMERCIAL

## 2019-12-12 PROCEDURE — G0157 HHC PT ASSISTANT EA 15: HCPCS

## 2019-12-13 ENCOUNTER — HOME CARE VISIT (OUTPATIENT)
Dept: SCHEDULING | Facility: HOME HEALTH | Age: 49
End: 2019-12-13
Payer: COMMERCIAL

## 2019-12-13 PROCEDURE — G0157 HHC PT ASSISTANT EA 15: HCPCS

## 2019-12-13 PROCEDURE — G0299 HHS/HOSPICE OF RN EA 15 MIN: HCPCS

## 2019-12-13 PROCEDURE — A6213 FOAM DRG >16<=48 SQ IN W/BDR: HCPCS

## 2019-12-14 ENCOUNTER — HOME CARE VISIT (OUTPATIENT)
Dept: SCHEDULING | Facility: HOME HEALTH | Age: 49
End: 2019-12-14
Payer: COMMERCIAL

## 2019-12-14 VITALS
TEMPERATURE: 98 F | SYSTOLIC BLOOD PRESSURE: 99 MMHG | RESPIRATION RATE: 14 BRPM | DIASTOLIC BLOOD PRESSURE: 53 MMHG | HEART RATE: 77 BPM | OXYGEN SATURATION: 95 %

## 2019-12-16 ENCOUNTER — HOME CARE VISIT (OUTPATIENT)
Dept: SCHEDULING | Facility: HOME HEALTH | Age: 49
End: 2019-12-16
Payer: COMMERCIAL

## 2019-12-16 VITALS
OXYGEN SATURATION: 93 % | DIASTOLIC BLOOD PRESSURE: 80 MMHG | SYSTOLIC BLOOD PRESSURE: 140 MMHG | HEART RATE: 83 BPM | TEMPERATURE: 98.7 F

## 2019-12-16 PROCEDURE — G0151 HHCP-SERV OF PT,EA 15 MIN: HCPCS

## 2020-02-12 ENCOUNTER — HOSPITAL ENCOUNTER (OUTPATIENT)
Dept: LAB | Age: 50
Discharge: HOME | End: 2020-02-12
Payer: COMMERCIAL

## 2020-02-12 ENCOUNTER — OFFICE VISIT (OUTPATIENT)
Dept: SURGERY | Age: 50
End: 2020-02-12

## 2020-02-12 VITALS
HEIGHT: 69 IN | OXYGEN SATURATION: 100 % | SYSTOLIC BLOOD PRESSURE: 105 MMHG | DIASTOLIC BLOOD PRESSURE: 61 MMHG | WEIGHT: 236 LBS | BODY MASS INDEX: 34.96 KG/M2 | HEART RATE: 84 BPM | TEMPERATURE: 97.9 F

## 2020-02-12 DIAGNOSIS — Z98.84 S/P BARIATRIC SURGERY: ICD-10-CM

## 2020-02-12 DIAGNOSIS — K90.9 INTESTINAL MALABSORPTION, UNSPECIFIED TYPE: Primary | ICD-10-CM

## 2020-02-12 DIAGNOSIS — K90.9 INTESTINAL MALABSORPTION, UNSPECIFIED TYPE: ICD-10-CM

## 2020-02-12 LAB
25(OH)D3 SERPL-MCNC: 67.1 NG/ML (ref 30–100)
FERRITIN SERPL-MCNC: 8 NG/ML (ref 8–388)
FOLATE SERPL-MCNC: >20 NG/ML (ref 3.1–17.5)
IRON SERPL-MCNC: 17 UG/DL (ref 50–175)
VIT B12 SERPL-MCNC: >2000 PG/ML (ref 211–911)

## 2020-02-12 PROCEDURE — 82728 ASSAY OF FERRITIN: CPT

## 2020-02-12 PROCEDURE — 36415 COLL VENOUS BLD VENIPUNCTURE: CPT

## 2020-02-12 PROCEDURE — 82607 VITAMIN B-12: CPT

## 2020-02-12 PROCEDURE — 84425 ASSAY OF VITAMIN B-1: CPT

## 2020-02-12 PROCEDURE — 83540 ASSAY OF IRON: CPT

## 2020-02-12 PROCEDURE — 82306 VITAMIN D 25 HYDROXY: CPT

## 2020-02-12 NOTE — PATIENT INSTRUCTIONS
Patient Instructions      1. Remember hydration goals - minimum of 64 ounces of liquids per day (dehydration is the number one reason for hospital readmission). 2. Sleep 7-9 hours each night to keep your metabolism up. 3. Continue to monitor carbohydrate and protein intake you need a minimum of  Grams of protein daily- remember to keep your total carbohydrates to 50 grams or less per day for best results. 4. To maximize weight loss keep your caloric intake between 800-1,200 calories daily. If you are exercising excessively, such as training for a marathon, you need to keep a food log and meet with the dietician so they can advise you on your diet choices, carbohydrate intake and caloric intake. 5. Continue to work towards exercise goals - 60-90 minutes, 5 times a week minimum of deliberate, aerobic exercise is the ultimate goal with strength training 2 times each week. Refer to [employer] for  information. 6. Remember to take vitamins as directed in your handbook. 7. Attend support group the 2nd Thursday of each month. 8. Constipation: Milk of Magnesia is for immediate relief only. Miralax is to be used every day if constipation is a chronic problem. 9. Diarrhea: patients will occasionally develop lactose intolerance after surgery. Check to see if your protein shake has whey in it. If it does try a protein powder or drink that does not have whey and stop all yogurts, cheeses and milks to see if the diarrhea goes away. 10. If you have had labs drawn. We will only call you if you have abnormal results. Otherwise you can access the lab results in \"[employer]\". You will only need the access code the first time you sign on. 11. Call us at [phone] or email us through [facility]" with questions,     concerns or worsening of condition, we have someone on call 24 hours a day.   If you are unable to reach our office, you are to go to your Primary Care Physician or the Emergency Department. NOTE TO GASTRIC BYPASS PATIENTS:  (SAME APPLIES TO GASTRIC SLEEVE PATIENTS FOR FIRST TWO MONTHS)  Remember that for the rest of your life, you are not able to take the following:  - NSAIDs (ibuprofen, goody powder, BC powder, Motrin, Advil, Mobic, Voltaren, Excedrin, etc.)  - Steroid pills or injections  - Smoke (cigarettes or recreational drugs)  - Alcohol  Use of any of the above may cause ulcers in your stomach which may perforate causing a medical emergency and surgery. Speak to our medical staff if another medical provider requires you to take steroids or NSAIDs. Supplement Resource Guide    Importance of Protein:   Maintains lean body mass, produces antibodies to fight off infections, heals wounds, minimizes hair loss, helps to give you energy, helps with satiety, and keeping you full between meals. Importance of Calcium:  Needed for healthy bones and teeth, normal blood clotting, and nervous system functioning, higher risk of osteoporosis and bone disease with non-compliance. Importance of Multivitamins: Many functions. Supply you with extra nutrients that you may be missing from food. May lead to iron deficiency anemia, weakness, fatigue, and many other symptoms with non-compliance. Importance of B Vitamins:  Important for red blood cell formation, metabolism, energy, and helps to maintain a healthy nervous system. Protein Supplement  Liquid diet phase: consume 90-100g protein daily. Once you are eating consume  35-50g protein each day from your protein supplement. 0-3 g fat per serving  0-3 g sugar per serving    The body can only absorb 30g of protein at one time, so do not consume more than that at one time. Multi-vitamin Supplement:    Start immediately after surgery: any complete chewable, such as: [employer]s Complete chewables. Avoid [place] sours or gummies.   They lack iron and other important nutrients and also have added sugar. Continue with a chewable vitamin or change to an adult complete multivitamin one month after surgery. Menstruating women can take a prenatal vitamin. Make sure it has at least 18 mg iron and 894-270 mcg folic acid Calcium Supplement:     Start taking within one month after surgery. Look for:   Calcium Citrate Plus D (1500 mg per day)    Recommend: Citracal    Avoid chocolate chewable calcium. Can use chewable bariatric or [employer] brand or similar chewable. The body cannot absorb more than 500-600 mg of calcium at one time. Take for Life    Vitamin D  Take 3,000 international units daily Vitamin B12  B Complex Vitamin  Start taking both within one month after surgery. Vitamin B12 (sublingual): Take 1000 mcg of Vitamin B12 three times weekly    Must take sublingually (meaning you put it under your tongue) or in a liquid drop form for easy absorption. B Complex Vitamin:   Take one pill daily or liquid drop form daily; as directed on bottle.      Take for Life

## 2020-02-12 NOTE — PROGRESS NOTES
Subjective: Ping Wallace is a 44 y.o. male is now 1 years status post laparoscopic sleeve gastrectomy. Doing well overall. He has lost a total of 83 pounds since surgery. Body mass index is 34.85 kg/m². Has lost 49% of EBW. Currently on a solid food diet without difficulty, reports no issues and denies vomiting and abdominal pain. Taking in 90+oz water daily. Sources of protein include beans, ground beef, cheese. 30 min of activity 3 days a week, including PT for total knee replacement. Patient is sleeping 8-9 hours a night on average. Bowel movements are regular. The patient is not having any pain. . The patient is compliant with multivitamins, calcium, Vit D and B12 supplements.      Weight Loss Metrics 2/12/2020 12/4/2019 12/2/2019 11/18/2019 11/14/2019 8/8/2019 7/24/2019   Today's Wt 236 lb 225 lb 220 lb 9 oz 226 lb 224 lb 9.6 oz 230 lb 12.8 oz 239 lb   BMI 34.85 kg/m2 33.23 kg/m2 33.54 kg/m2 34.36 kg/m2 33.17 kg/m2 34.08 kg/m2 35.29 kg/m2          Comorbidities:    Hypertension: improved, on Rx, denies lightheadedness or dizziness  Diabetes: resolved  Obstructive Sleep Apnea: resolved  Hyperlipidemia: unchanged, on statin  Stress Urinary Incontinence: not applicable  Gastroesophageal Reflux: improved, on PPI   Weight related arthropathy: improved, had 2nd total knee replacement and is in PT        Patient Active Problem List   Diagnosis Code    Bipolar affective disorder, mixed, mild ([facility]) F31.61    Alcohol dependence in remission ([facility] F10.21    Obesity, morbid ([address] E66.01    Morbid obesity ([facility] E66.01    Low testosterone R79.89    Bipolar 1 disorder ([facility] [facility] F31.9    GERD (gastroesophageal reflux disease) K21.9    Hypertension I10    Tremors of nervous system R25.1    Hypercholesteremia E78.00    Arthritis of both knees M17.0    Arthritis of spine M47.819    Arthritis of both hands M19.041, M19.042    Migraines G43.909    Hx of cardiomyopathy Z86.79    Diastasis recti M62.08  CVA (cerebral vascular accident) ([facility] I63.9    Angina at rest ([facility] [facility] I20.8    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    Arthritis M19.90    Intestinal malabsorption K90.9    S/P laparoscopic sleeve gastrectomy Z98.84    Intertriginous candidiasis B37.2    Primary localized osteoarthritis of right knee M17.11        Past Medical History:   Diagnosis Date    Adverse effect of anesthesia     slow to awaken    Angina at rest [facility])     Arthritis     Autoimmune disease ([facility] [address]     fibromyalgia    Bipolar 1 disorder ([facility] [facility]     Cerebral vasculitis     x 2    Concussion     childhood    CVA (cerebral vascular accident) ([facility] [address] [facility]     2016- due to vasculitis;october 2018    Diabetes ([facility] [address] 2016    no meds    Diastasis recti     Fibromyalgia     GERD (gastroesophageal reflux disease)     Hx of cardiomyopathy     ? reason for diagnosis 10 years ago    Hypercholesteremia     Hypertension 2016    Ill-defined condition 2019    left thumb crush injury, fx of tip of finger    Intertriginous candidiasis 8/8/2019    Low testosterone     Migraines     Primary localized osteoarthritis of right knee 11/21/2019    Psychiatric disorder     bipolar    Sleep apnea     no c-pap    Tremors of nervous system        Past Surgical History:   Procedure Laterality Date    HX APPENDECTOMY      HX GASTRIC BYPASS  2019    sleeve    HX ORTHOPAEDIC      CTR both hands    HX ORTHOPAEDIC      cervical fusion    HX ORTHOPAEDIC      TORN MENISCUS    HX TONSILLECTOMY         Current Outpatient Medications   Medication Sig Dispense Refill    calcium carbonate (CALCIUM 600 PO) Take 2 Tabs by mouth three (3) times daily.  ferrous fumarate/vit Bcomp,C (SUPER B COMPLEX PO) Take 1 Cap by mouth every Monday, Wednesday, Friday.  OTHER Gas X      buPROPion XL (WELLBUTRIN XL) 300 mg XL tablet Take 1 Tab by mouth every morning. 30 Tab 5    DULoxetine (CYMBALTA) 60 mg capsule Take 1 Cap by mouth daily.  30 Cap 5    gabapentin (NEURONTIN) 600 mg tablet Take 2 Tabs by mouth two (2) times a day AND 1 Tab daily (after lunch). Indications: Neuropathic Pain (Patient taking differently: Take 2 Tabs by mouth two (2) times a day AND 1 Tab daily (after lunch). Indications: bipolar) 150 Tab 5    QUEtiapine (SEROQUEL) 300 mg tablet Take 1 Tab by mouth nightly. Indications: Bipolar Disorder in Remission 30 Tab 5    risperiDONE (RISPERDAL) 2 mg tablet Take 1 Tab by mouth nightly. 30 Tab 5    OXcarbazepine (TRILEPTAL) 300 mg tablet Take 1 Tab by mouth two (2) times a day. 60 Tab 5    traZODone (DESYREL) 100 mg tablet Take 1 Tab by mouth nightly as needed for Sleep. 30 Tab 5    aspirin delayed-release 81 mg tablet Take  by mouth daily.  multivitamin with iron (FLINTSTONES) chewable tablet Take 1 Tab by mouth daily.  cyanocobalamin (VITAMIN B-12) 1,000 mcg sublingual tablet Take 1,000 mcg by mouth every Monday, Wednesday, Friday. Indications: Rx      cholecalciferol, vitamin D3, (VITAMIN D3) 2,000 unit tab Take 2,000 Units by mouth daily. Indications: Rx      nystatin (MYCOSTATIN) topical cream Apply  to affected area two (2) times a day. (Patient taking differently: Apply 1 g to affected area two (2) times a day.) 30 g 6    diclofenac potassium (ZIPSOR) 25 mg capsule Take 25 mg by mouth daily.  cetirizine (ZYRTEC) 10 mg tablet Take 1 Tab by mouth daily.  erenumab-aooe (AIMOVIG AUTOINJECTOR) 70 mg/mL injection 70 mg by SubCUTAneous route daily as needed for Pain (migraine). Indications: 140 mg      nitroglycerin (NITROSTAT) 0.4 mg SL tablet 1 tab      testosterone cypionate (DEPOTESTOTERONE CYPIONATE) 200 mg/mL injection 300 mg by IntraMUSCular route. Indications: every 2 weeks      losartan (COZAAR) 50 mg tablet Take 25 mg by mouth daily.  primidone (MYSOLINE) 50 mg tablet Take 50 mg by mouth nightly. Indications: Essential Tremor      B.infantis-B.ani-B.long-B.bifi (PROBIOTIC 4X) 10-15 mg TbEC Take 1 Cap by mouth daily.       onabotulinumtoxinA (BOTOX INJECTION) by IntraMUSCular route every three (3) months. Indications: Last one Mid November      Omeprazole delayed release (PRILOSEC D/R) 20 mg tablet Take 40 mg by mouth two (2) times a day.  atorvastatin (LIPITOR) 20 mg tablet Take 40 mg by mouth nightly. Allergies   Allergen Reactions    Sudafed [Pseudoephedrine Hcl] Other (comments)     Pt advised not to take due to stroke in aug '16       Review of Systems:  General - No history or complaints of unexpected fever or chills  Head/Neck - No history or complaints of headache or dizziness  Cardiac - No history or complaints of chest pain, palpitations, or shortness of breath  Pulmonary - No history or complaints of shortness of breath or productive cough  Gastrointestinal - as noted above  Genitourinary - No history or complaints of hematuria/dysuria or renal lithiasis  Musculoskeletal - No history or complaints of joint  muscular weakness  Hematologic - No history of any bleeding episodes  Neurologic - No history or complaints of  migraine headaches or neurologic symptoms    Objective:     Visit Vitals  /61 (BP 1 Location: Left arm, BP Patient Position: Sitting)   Pulse 84   Temp 97.9 °F (36.6 °C)   Ht 5' 9\" (1.753 m)   Wt 107 kg (236 lb)   SpO2 100%   BMI 34.85 kg/m²       General:  alert, cooperative, no distress, appears stated age   Chest: lungs clear to auscultation, breath sounds equal and symmetric, no rhonchi, rales or wheezes, no accessory muscle use   Cor:   Regular rate and rhythm or without murmur or extra heart sounds   Abdomen: soft, bowel sounds active, non-tender, no masses or organomegaly   Incisions:   healed well       Assessment:   History of Morbid obesity, status post laparoscopic sleeve gastrectomy. Doing well postoperatively. · Exercise a minimum of 30 minutes daily. · Strict diet control, patient is to keep carbohydrate consumption <50g daily for additional weight loss.    · Intertriginous candidiasis - nystatin cream  · Constipation - list of OTC remedies was given today  · Essential tremor - Continue meds and follow up with PCP  · Migraines - Continue meds and follow up with Neurology  · Hyperlipidemia - Continue meds and follow up with PCP  · HTN - Continue meds and follow up with PCP  · Seasonal allergies - Continue meds and follow up with PCP  · GERD - continue PPI  · Arthritis - Continue meds and follow up with Rheumatology  · Bipolar d/o - Continue meds and follow up with Psychiatrist  Plan:     1. Increase activity to the goal of 30 minutes daily  2. Discussed patients weight loss goals and dietary choices in relation to goals. 3. Sleep goal is 7-9 hours each night. Patient education given on the effects of sleep deprivation on weight control. 4. Reminded to measure portions, continue high protein, low carbohydrate diet. Reminded to eat regularly, to eat slowly & not to drink with meals. 5. Continue vitamin supplementation  6. Continue current medications and follow up with PCP for management of regimen. 7. Continue cardio exercise and add resistance exercises. 60-90 minutes of aerobic activity 5 days a week and strength training 2 days each week. 8. Encouraged to attend support group   9. Lab slip given today. 10. I have discussed this plan with patient and they verbalized understanding  11. Follow up in 6 months or sooner if patient has questions, concerns or worsening of condition, if unable to reach our office, patient should report to the ED. 12. Mr. Jake Ocasio has a reminder for a \"due or due soon\" health maintenance. I have asked that he contact his primary care provider for a follow-up on this health maintenance.

## 2020-02-15 LAB — VIT B1 BLD-SCNC: 195.4 NMOL/L (ref 66.5–200)

## 2020-02-19 ENCOUNTER — TELEPHONE (OUTPATIENT)
Dept: SURGERY | Age: 50
End: 2020-02-19

## 2020-02-19 DIAGNOSIS — E61.1 IRON DEFICIENCY: Primary | ICD-10-CM

## 2020-02-19 NOTE — TELEPHONE ENCOUNTER
I called patient and reviewed labs with him. Everything is normal except his iron and ferritin which are low. In December he had serial CBC which all showed low Hgb. I have placed a referral to [facility] for patient to be treated for iron deficiency anemia. Pt verbalized understanding. Patient is to contact our office with any problems, worsening of condition, questions or concerns. If we are not available or unable to be reached, patient is to proceed to the Emergency Department.

## 2020-07-08 ENCOUNTER — VIRTUAL VISIT (OUTPATIENT)
Dept: SURGERY | Age: 50
End: 2020-07-08

## 2020-07-08 VITALS — WEIGHT: 221 LBS | HEIGHT: 69 IN | BODY MASS INDEX: 32.73 KG/M2

## 2020-07-08 DIAGNOSIS — K90.9 INTESTINAL MALABSORPTION, UNSPECIFIED TYPE: Primary | ICD-10-CM

## 2020-07-08 DIAGNOSIS — Z98.84 S/P BARIATRIC SURGERY: ICD-10-CM

## 2020-07-08 NOTE — PROGRESS NOTES
Subjective: Mike Farias is a 44 y.o. male is now 17 months status post laparoscopic sleeve gastrectomy. Doing well overall. He has lost a total of 98 pounds since surgery. Body mass index is 32.64 kg/m². Has lost 57% of EBW. Currently on a solid food diet without difficulty, reports no issues and denies vomiting and abdominal pain. Sources of protein include ground beef, hot dogs, other meats, cheese, nuts. He has been doing a significant amount of yard work. Has been struggling with hydration during yard work. Patient is sleeping 7-10 hours a night on average. He has some new back problems and knee pain. He has been going for iron infusions. He goes again tomorrow and this will be the 3rd time he has had an infusion. Bowel movements are constipated, has been taking Miralax prn. The patient is not having any pain. . The patient is compliant with multivitamins, calcium, Vit D and B12 supplements.      Weight Loss Metrics 7/8/2020 2/12/2020 12/4/2019 12/2/2019 11/18/2019 11/14/2019 8/8/2019   Today's Wt 221 lb 236 lb 225 lb 220 lb 9 oz 226 lb 224 lb 9.6 oz 230 lb 12.8 oz   BMI 32.64 kg/m2 34.85 kg/m2 33.23 kg/m2 33.54 kg/m2 34.36 kg/m2 33.17 kg/m2 34.08 kg/m2          Patient Active Problem List   Diagnosis Code    Bipolar affective disorder, mixed, mild ([facility]) F31.61    Alcohol dependence in remission ([address] F10.21    Obesity, morbid ([address] E66.01    Morbid obesity ([address] E66.01    Low testosterone R79.89    Bipolar 1 disorder ([address] F31.9    GERD (gastroesophageal reflux disease) K21.9    Hypertension I10    Tremors of nervous system R25.1    Hypercholesteremia E78.00    Arthritis of both knees M17.0    Arthritis of spine M47.819    Arthritis of both hands M19.041, M19.042    Migraines G43.909    Hx of cardiomyopathy Z86.79    Diastasis recti M62.08    CVA (cerebral vascular accident) ([address] I63.9    Angina at rest ([facility] [address] I20.8    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    Arthritis M19.90    Intestinal malabsorption K90.9    S/P laparoscopic sleeve gastrectomy Z98.84    Intertriginous candidiasis B37.2    Primary localized osteoarthritis of right knee M17.11    Iron deficiency E61.1        Past Medical History:   Diagnosis Date    Adverse effect of anesthesia     slow to awaken    Angina at rest [facility])     Arthritis     Autoimmune disease ([facility]     fibromyalgia    Bipolar 1 disorder ([facility]     Cerebral vasculitis     x 2    Concussion     childhood    CVA (cerebral vascular accident) ([facility]     2016- due to vasculitis;october 2018    Diabetes ([facility] 2016    no meds    Diastasis recti     Fibromyalgia     GERD (gastroesophageal reflux disease)     Hx of cardiomyopathy     ? reason for diagnosis 10 years ago    Hypercholesteremia     Hypertension 2016    Ill-defined condition 2019    left thumb crush injury, fx of tip of finger    Intertriginous candidiasis 8/8/2019    Iron deficiency 2/19/2020    Low testosterone     Migraines     Primary localized osteoarthritis of right knee 11/21/2019    Psychiatric disorder     bipolar    Sleep apnea     no c-pap    Tremors of nervous system        Past Surgical History:   Procedure Laterality Date    HX APPENDECTOMY      HX GASTRIC BYPASS  2019    sleeve    HX ORTHOPAEDIC      CTR both hands    HX ORTHOPAEDIC      cervical fusion    HX ORTHOPAEDIC      TORN MENISCUS    HX TONSILLECTOMY         Current Outpatient Medications   Medication Sig Dispense Refill    gabapentin (NEURONTIN) 600 mg tablet Take 2 Tabs by mouth two (2) times a day AND 1 Tab daily (after lunch). Indications: neuropathic pain 150 Tab 5    OXcarbazepine (TRILEPTAL) 300 mg tablet Take 1 Tab by mouth daily. 30 Tab 5    risperiDONE (RisperDAL) 2 mg tablet Take 1 Tab by mouth nightly. 30 Tab 5    buPROPion XL (Wellbutrin XL) 300 mg XL tablet Take 1 Tab by mouth every morning. 30 Tab 5    DULoxetine (Cymbalta) 60 mg capsule Take 1 Cap by mouth daily.  30 Cap 5    QUEtiapine (SEROqueL) 300 mg tablet Take 1 Tab by mouth nightly. Indications: bipolar disorder in remission 30 Tab 5    traZODone (DESYREL) 100 mg tablet Take 1 Tab by mouth nightly as needed for Sleep. 30 Tab 5    calcium carbonate (CALCIUM 600 PO) Take 2 Tabs by mouth three (3) times daily.  ferrous fumarate/vit Bcomp,C (SUPER B COMPLEX PO) Take 1 Cap by mouth every Monday, Wednesday, Friday.  OTHER Gas X      aspirin delayed-release 81 mg tablet Take  by mouth daily.  multivitamin with iron (FLINTSTONES) chewable tablet Take 1 Tab by mouth daily.  cyanocobalamin (VITAMIN B-12) 1,000 mcg sublingual tablet Take 1,000 mcg by mouth every Monday, Wednesday, Friday. Indications: Rx      cholecalciferol, vitamin D3, (VITAMIN D3) 2,000 unit tab Take 2,000 Units by mouth daily. Indications: Rx      nystatin (MYCOSTATIN) topical cream Apply  to affected area two (2) times a day. (Patient taking differently: Apply 1 g to affected area two (2) times a day.) 30 g 6    diclofenac potassium (ZIPSOR) 25 mg capsule Take 25 mg by mouth daily.  cetirizine (ZYRTEC) 10 mg tablet Take 1 Tab by mouth daily.  erenumab-aooe (AIMOVIG AUTOINJECTOR) 70 mg/mL injection 70 mg by SubCUTAneous route daily as needed for Pain (migraine). Indications: 140 mg      nitroglycerin (NITROSTAT) 0.4 mg SL tablet 1 tab      testosterone cypionate (DEPOTESTOTERONE CYPIONATE) 200 mg/mL injection 300 mg by IntraMUSCular route. Indications: every 2 weeks      losartan (COZAAR) 50 mg tablet Take 25 mg by mouth daily.  primidone (MYSOLINE) 50 mg tablet Take 50 mg by mouth nightly. Indications: Essential Tremor      B.infantis-B.ani-B.long-B.bifi (PROBIOTIC 4X) 10-15 mg TbEC Take 1 Cap by mouth daily.  onabotulinumtoxinA (BOTOX INJECTION) by IntraMUSCular route every three (3) months.  Indications: Last one Mid November      Omeprazole delayed release (PRILOSEC D/R) 20 mg tablet Take 40 mg by mouth two (2) times a day.  atorvastatin (LIPITOR) 20 mg tablet Take 40 mg by mouth nightly. Allergies   Allergen Reactions    Sudafed [Pseudoephedrine Hcl] Other (comments)     Pt advised not to take due to stroke in aug '16       Review of Systems:  General - No history or complaints of unexpected fever or chills  Head/Neck - No history or complaints of headache or dizziness  Cardiac - No history or complaints of chest pain, palpitations, or shortness of breath  Pulmonary - No history or complaints of shortness of breath or productive cough  Gastrointestinal - as noted above  Genitourinary - No history or complaints of hematuria/dysuria or renal lithiasis  Musculoskeletal - No history or complaints of joint  muscular weakness  Hematologic - No history of any bleeding episodes  Neurologic - No history or complaints of  migraine headaches or neurologic symptoms    Objective:     Visit Vitals  Ht 5' 9\" (1.753 m)   Wt 100.2 kg (221 lb)   BMI 32.64 kg/m²       Physical Examination: General appearance - alert, well appearing, and in no distress and oriented to person, place, and time  Mental status - alert, oriented to person, place, and time, normal mood, behavior, speech, dress, motor activity, and thought processes  Eyes - pupils equal and reactive, extraocular eye movements intact, sclera anicteric, left eye normal, right eye normal  Ears - right ear normal, left ear normal  Neck- good extension and flexion, no obvious swelling  Chest - good air movement  Heart - N/A  Abdomen - no obvious distension, scars as noted:   Neurological - alert, oriented, normal speech, no focal findings or movement disorder noted  Musculoskeletal - no swelling noted  Extremities - normal movement        Assessment:   History of Morbid obesity, status post laparoscopic sleeve gastrectomy. Doing well postoperatively. Patient is a good candidate for skin removal surgery. He is to contact plastic surgeon for consult.    Increase fluid intake to >64oz daily or more of sugar free and caffeine free fluids. Drink sugar free electrolyte beverages when doing work outdoors. Iron deficiency anemia - continue follow up care with hematologist  · Intertriginous candidiasis - nystatin cream  · Constipation - list of OTC remedies was given today  · Essential tremor - Continue meds and follow up with PCP  · Migraines - Continue meds and follow up with Neurology  · Hyperlipidemia - Continue meds and follow up with PCP  · HTN - Continue meds and follow up with PCP  · Seasonal allergies - Continue meds and follow up with PCP  · GERD - continue PPI  · Arthritis - Continue meds and follow up with Rheumatology  · Bipolar d/o - Continue meds and follow up with Psychiatrist  Plan:     1. Increase activity to the goal of 30 minutes daily  2. Discussed patients weight loss goals and dietary choices in relation to goals. 3. Sleep goal is 7-9 hours each night. Patient education given on the effects of sleep deprivation on weight control. 4. Reminded to measure portions, continue high protein, low carbohydrate diet. Reminded to eat regularly, to eat slowly & not to drink with meals. 5. Continue vitamin supplementation  6. Continue current medications and follow up with PCP for management of regimen. 7. Continue cardio exercise and add resistance exercises. 60-90 minutes of aerobic activity 5 days a week and strength training 2 days each week. 8. Encouraged to attend support group   9. I have discussed this plan with patient and they verbalized understanding  10. Follow up in 6 months or sooner if patient has questions, concerns or worsening of condition, if unable to reach our office, patient should report to the ED. 6. Mr. Estefani Kelly has a reminder for a \"due or due soon\" health maintenance. I have asked that he contact his primary care provider for a follow-up on this health maintenance.      This visit with Spencer Redding was performed under [employer] telemedicine guidelines during the coronavirus (WWQIK-62) public health emergency on 7/8/2020 in an interactive   fashion using [employer]. They understand that this telemedicine encounter is a billable service, with coverage determined by their insurance carrier. They are aware that   they may receive a bill and have provided verbal consent for this virtual visit. This visit was performed with the patient in their home environment and provider was   present at [facility] at [facility].  I have spent over 30 minutes on this visit  both prior to the visit reviewing the patients chart and with the patient face to face. I have reviewed their medical history, performed a telemedicine physical examination, and discussed the plan of action to date. They understand that they will be asked   to come to the office when our office is allowed normal patient interaction, as dictated by public health officials, for a face-to-face visit to rediscuss all of the things we  have talked about today.

## 2021-01-14 ENCOUNTER — DOCUMENTATION ONLY (OUTPATIENT)
Dept: SURGERY | Age: 51
End: 2021-01-14

## 2021-01-14 NOTE — PROGRESS NOTES
Per [facility] requirements;  E-mail and letter sent for follow up appointment. [employer] [facility]  [address]   [address]  Weight Loss [place]  [facility]  [facility]      Dear Patient,    Your health is our main concern. It is important for your health to have follow-up lab work and to see your surgeon at 2 months, 4 months, 6 months, 9 months and annually after your weight loss surgery. Additionally, the Department of Bariatric Surgery at our hospital is a member of the [employer] of [address] [place] [address] Surgical Quality Improvement Program ([facility] NSQIP). As a participant in this program, we gather information on the outcomes of our patients after surgery. Please call the office for a follow up appointment at [phone]. If you have moved out of the area or have changed surgeons please call us and let us know the name of your doctor. Your health and feedback are important to us. We greatly appreciate your response.        Thank you,  [facility] [place] [facility] [address] [facility]

## 2021-03-23 ENCOUNTER — OFFICE VISIT (OUTPATIENT)
Dept: SURGERY | Age: 51
End: 2021-03-23
Payer: COMMERCIAL

## 2021-03-23 VITALS
DIASTOLIC BLOOD PRESSURE: 63 MMHG | SYSTOLIC BLOOD PRESSURE: 102 MMHG | HEART RATE: 75 BPM | WEIGHT: 240 LBS | BODY MASS INDEX: 35.55 KG/M2 | OXYGEN SATURATION: 97 % | HEIGHT: 69 IN | TEMPERATURE: 97.8 F

## 2021-03-23 DIAGNOSIS — Z98.84 S/P LAPAROSCOPIC SLEEVE GASTRECTOMY: ICD-10-CM

## 2021-03-23 DIAGNOSIS — E61.1 IRON DEFICIENCY: ICD-10-CM

## 2021-03-23 DIAGNOSIS — Z98.84 S/P BARIATRIC SURGERY: ICD-10-CM

## 2021-03-23 DIAGNOSIS — K90.9 INTESTINAL MALABSORPTION, UNSPECIFIED TYPE: Primary | ICD-10-CM

## 2021-03-23 DIAGNOSIS — M47.819 ARTHRITIS OF SPINE: ICD-10-CM

## 2021-03-23 DIAGNOSIS — B37.2 INTERTRIGINOUS CANDIDIASIS: ICD-10-CM

## 2021-03-23 PROBLEM — E66.01 OBESITY, MORBID (HCC): Status: RESOLVED | Noted: 2018-01-15 | Resolved: 2021-03-23

## 2021-03-23 PROCEDURE — 99214 OFFICE O/P EST MOD 30 MIN: CPT | Performed by: NURSE PRACTITIONER

## 2021-03-23 RX ORDER — CLOTRIMAZOLE AND BETAMETHASONE DIPROPIONATE 10; .64 MG/G; MG/G
CREAM TOPICAL
COMMUNITY
Start: 2021-03-01

## 2021-03-23 RX ORDER — UBROGEPANT 50 MG/1
TABLET ORAL
COMMUNITY
Start: 2021-02-25

## 2021-03-23 RX ORDER — NYSTATIN 100000 U/G
CREAM TOPICAL 2 TIMES DAILY
Qty: 30 G | Refills: 6 | Status: SHIPPED | OUTPATIENT
Start: 2021-03-23

## 2021-03-23 NOTE — PATIENT INSTRUCTIONS
Dr Vira Rust with [facility] Patient Instructions 1. Remember hydration goals - minimum of 64 ounces of liquids per day (dehydration is the number one reason for hospital readmission). 2. Continue to monitor carbohydrate and protein intake you need a minimum of  Grams of protein daily- remember to keep your total carbohydrates to 50 grams or less per day for best results. 3. Continue to work towards exercise goals - 60-90 minutes, 5 times a week minimum of deliberate, aerobic exercise is the ultimate goal with strength training 2 times each week. Refer to [employer] for  information. 4. Remember to take vitamins as directed. 5. Attend support group the 2nd Thursday of each month. 6.  Constipation: Milk of Magnesia is for immediate relief only. Miralax is to be used every day if constipation is a chronic problem. 7.  Diarrhea: patients will occasionally develop lactose intolerance after surgery. Check to see if your protein shake has whey in it. If it does try a protein powder or drink that does not have whey and stop all yogurts, cheeses and milks to see if the diarrhea goes away. 8.  If you have had labs drawn. We will only call you if you have abnormal results. Otherwise you can access the lab results in \"mychart\". You will only need the access code the first time you sign on. 9.  Call us at [phone] or email us through [facility] with questions,     concerns or worsening of condition, we have someone on call 24 hours a day. If you are unable to reach our office, you are to go to your Primary Care Physician or the Emergency Department. NOTE TO GASTRIC BYPASS PATIENTS:  (SAME APPLIES TO GASTRIC SLEEVE PATIENTS FOR FIRST TWO MONTHS) Remember that for the rest of your life, you are not able to take the following: 
- NSAIDs (ibuprofen, goody powder, BC powder, Motrin, Advil, Mobic, Voltaren, Excedrin, etc.) - Steroid pills or injections - Smoke (cigarettes or recreational drugs) - Alcohol Use of any of the above may cause ulcers in your stomach which may perforate causing a medical emergency and surgery. Speak to our medical staff if another medical provider requires you to take steroids or NSAIDs. Supplement Resource Guide Importance of Protein:  
Maintains lean body mass, produces antibodies to fight off infections, heals wounds, minimizes hair loss, helps to give you energy, helps with satiety, and keeping you full between meals. Importance of Calcium: 
Needed for healthy bones and teeth, normal blood clotting, and nervous system functioning, higher risk of osteoporosis and bone disease with non-compliance. Importance of Multivitamins: Many functions. Supply you with extra nutrients that you may be missing from food. May lead to iron deficiency anemia, weakness, fatigue, and many other symptoms with non-compliance. Importance of B Vitamins: 
Important for red blood cell formation, metabolism, energy, and helps to maintain a healthy nervous system. Protein Supplement Find one you like now. Use immediately after surgery. Look for: 
35-50g protein each day from your protein supplement once you reach the progression diet. 0-3 g fat per serving 0-3 g sugar per serving Protein drinks should be split in separate dosages. Recommend: Lifelong 1 year + Calcium Supplement:  
 
Start taking within a month after surgery. Look for: Calcium Citrate Plus D (1500 mg per day) Recommend: Citracal 
 
 . Avoid chocolate chewable calcium. Can use chewable bariatric or [employer] brand or similar chewable. The body cannot absorb more than 500-600 mg of calcium at a time. Take for Life Multi-vitamin Supplement:   
 
Start immediately after surgery: any complete chewable, such as: [employer]s Complete chewables.  
 
Avoid [employer] sours or gummies. They lack iron and other important nutrients and also have added sugar. Continue with chewable vitamin or change to adult complete multivitamin one month after surgery. Menstruating women can take a prenatal vitamin. Make sure has at least 18 mg iron and 831-449 mcg folic acid Vitamin B12, B Complex Vitamin, and Biotin Start taking within a month after surgery. Vitamin B12:  1000 mcg of Vitamin B12 three times weekly Must take sublingually (meaning you take it under your tongue) or in a liquid drop form for easy absorption. B Complex Vitamin: Take a pill or liquid drop form once daily. Biotin: This vitamin can help prevent hair loss. Recommend 5mg  
(5000 mcg) a day Biotin is Optional  
 
 
 
 
  
Learning About Physical Activity What is physical activity? Physical activity is any kind of activity that gets your body moving. The types of physical activity that can help you get fit and stay healthy include: · Aerobic or \"cardio\" activities that make your heart beat faster and make you breathe harder, such as brisk walking, riding a bike, or running. Aerobic activities strengthen your heart and lungs and build up your endurance. · Strength training activities that make your muscles work against, or \"resist,\" something, such as lifting weights or doing push-ups. These activities help tone and strengthen your muscles. · Stretches that allow you to move your joints and muscles through their full range of motion. Stretching helps you be more flexible and avoid injury. What are the benefits of physical activity? Being active is one of the best things you can do for your health. It helps you to: · Feel stronger and have more energy to do all the things you like to do. · Focus better at school or work. · Feel, think, and sleep better. · Reach and stay at a healthy weight. · Lose fat and build lean muscle. · Lower your risk for serious health problems.  
· Keep your bones, muscles, and joints strong. How can you make physical activity part of your life? Get at least 30 minutes of exercise on most days of the week. Walking is a good choice. You also may want to do other activities, such as running, swimming, cycling, or playing tennis or team sports. Pick activities that you likeones that make your heart beat faster, your muscles stronger, and your muscles and joints more flexible. If you find more than one thing you like doing, do them all. You don't have to do the same thing every day. Get your heart pumping every day. Any activity that makes your heart beat faster and keeps it at that rate for a while counts. Here are some great ways to get your heart beating faster: · Go for a brisk walk, run, or bike ride. · Go for a hike or swim. · Go in-line skating. · Play a game of touch football, basketball, or soccer. · Ride a bike. · Play tennis or racquetball. · Climb stairs. Even some household chores can be aerobicjust do them at a faster pace. Vacuuming, raking or mowing the lawn, sweeping the garage, and washing and waxing the car all can help get your heart rate up. Strengthen your muscles during the week. You don't have to lift heavy weights or grow big, bulky muscles to get stronger. Doing a few simple activities that make your muscles work against, or \"resist,\" something can help you get stronger. For example, you can: · Do push-ups or sit-ups, which use your own body weight as resistance. · Lift weights or dumbbells or use stretch bands at home or in a gym or community center. Stretch your muscles often. Stretching will help you as you become more active. It can help you stay flexible, loosen tight muscles, and avoid injury. It can also help improve your balance and posture and can be a great way to relax. Be sure to stretch the muscles you'll be using when you work out. It's best to warm your muscles slightly before you stretch them.  Walk or do some other light aerobic activity for a few minutes, and then start stretching. When you stretch your muscles: · Do it slowly. Stretching is not about going fast or making sudden movements. · Don't push or bounce during a stretch. · Hold each stretch for at least 15 to 30 seconds, if you can. You should feel a stretch in the muscle, but not pain. · Breathe out as you do the stretch. Then breathe in as you hold the stretch. Don't hold your breath. If you're worried about how more activity might affect your health, have a checkup before you start. Follow any special advice your doctor gives you for getting a smart start. Where can you learn more? Go to http://www.gray.com/ Enter P605 in the search box to learn more about \"Learning About Physical Activity. \" Current as of: January 16, 2020               Content Version: 12.6 © 2489-1996 [employer]. Care instructions adapted under license by [employer] (which disclaims liability or warranty for this information). If you have questions about a medical condition or this instruction, always ask your healthcare professional. [address] any warranty or liability for your use of this information.

## 2021-03-23 NOTE — PROGRESS NOTES
Subjective: Radha Faye  is a 44 y.o. male who presents for follow-up about 2.13 years following laparoscopic sleeve gastrectomy. He has lost a total of 79 pounds since surgery. Body mass index is 35.44 kg/m². . EBWL is (46%). The patient presents today to assess their progress toward their goal of weight loss and to address any issues that may be present. Today the patient and I have reviewed their diet and how appropriate their food choices are. The following issues have been identified - none from a surgical standpoint. Has has gained 19 lbs since his 18 month postop visit. Has not been exercising as much due to back and knee problems. Surgery related complication: NA       He reports rash under pannus and denies vomiting, abdominal pain, diarrhea and reflux. The patients diet choices have been reviewed today and counseling was given. Fluid intake:      Protein intake: hamburger, meatballs, Scottish nascimento, chicken + 2 shakes      Meals/day:  Oatmeal, [employer], added back sodas    Patients pain score:0/10    The patient's exercise level: not active. Going to walk more now warmer    Changes in his medical history and medications have been reviewed. Getting ready for nerve ablation in back by Armand Meyer. Has rash on arms, treated with fluconazole, waiting for dermatology appt. C/o constipation from narcotics. Got 5 iron infusions,  last one 1 week ago, Hgb in Nov 2020 was 8.7, not wanting to get colonoscopy, now 13.9. Has f/u April 30th.  Dr Kavitha Solis    Comorbidities:    Hypertension:resolved  Diabetes: resolved  Obstructive Sleep Apnea: resolved  Hyperlipidemia: unchanged, on statin  Stress Urinary Incontinence: not applicable  Gastroesophageal Reflux: improved, on PPI   Weight related arthropathy: improved, had 2nd total knee replacement and is in PT    Patient Active Problem List   Diagnosis Code    Bipolar affective disorder, mixed, mild ([address] F31.61    Alcohol dependence in remission ([facility] [address] F10.21    Obesity, morbid ([address] E66.01    Morbid obesity (HCC) E66.01    Low testosterone R79.89    Bipolar 1 disorder (HCC) F31.9    GERD (gastroesophageal reflux disease) K21.9    Hypertension I10    Tremors of nervous system R25.1    Hypercholesteremia E78.00    Arthritis of both knees M17.0    Arthritis of spine M47.819    Arthritis of both hands M19.041, M19.042    Migraines G43.909    Hx of cardiomyopathy Z86.79    Diastasis recti M62.08    CVA (cerebral vascular accident) ([address] I63.9    Angina at rest ([facility] [address] I20.8    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)     Arthritis     Autoimmune disease ([facility] [address]     fibromyalgia    Bipolar 1 disorder ([facility] [address]     Cerebral vasculitis     x 2    Concussion     childhood    CVA (cerebral vascular accident) ([facility] [address]     2016- due to vasculitis;october 2018    Diabetes ([facility] [address] 2016    no meds    Diastasis recti     Fibromyalgia     GERD (gastroesophageal reflux disease)     Hx of cardiomyopathy     ?  reason for diagnosis 10 years ago   [address] [place] [address] Hypercholesteremia     Hypertension 2016    Ill-defined condition 2019    left thumb crush injury, fx of tip of finger    Intertriginous candidiasis 8/8/2019    Iron deficiency 2/19/2020    Low testosterone     Migraines     Primary localized osteoarthritis of right knee 11/21/2019    Psychiatric disorder     bipolar    Sleep apnea     no c-pap    Tremors of nervous system      Past Surgical History:   Procedure Laterality Date    HX APPENDECTOMY      HX GASTRIC BYPASS  2019    sleeve    HX ORTHOPAEDIC      CTR both hands    HX ORTHOPAEDIC      cervical fusion    HX ORTHOPAEDIC      TORN MENISCUS    HX TONSILLECTOMY       Current Outpatient Medications   Medication Sig Dispense Refill    traZODone (DESYREL) 150 mg tablet Take 2 Tabs by mouth nightly as needed for Sleep. 60 Tab 3    risperiDONE (RisperDAL) 2 mg tablet Take 1 Tab by mouth nightly. 30 Tab 5    OXcarbazepine (TRILEPTAL) 300 mg tablet Take 1 Tab by mouth daily. 30 Tab 5    buPROPion XL (Wellbutrin XL) 300 mg XL tablet Take 1 Tab by mouth every morning. 30 Tab 5    DULoxetine (Cymbalta) 60 mg capsule Take 1 Cap by mouth daily. 30 Cap 5    QUEtiapine (SEROqueL) 300 mg tablet Take 1 Tab by mouth nightly. Indications: bipolar disorder in remission 30 Tab 5    gabapentin (NEURONTIN) 600 mg tablet Take 2 Tabs by mouth two (2) times a day AND 1 Tab daily (after lunch). Indications: neuropathic pain 150 Tab 5    OTHER Gas X      aspirin delayed-release 81 mg tablet Take  by mouth daily.  multivitamin with iron (FLINTSTONES) chewable tablet Take 1 Tab by mouth daily.  cholecalciferol, vitamin D3, (VITAMIN D3) 2,000 unit tab Take 2,000 Units by mouth daily. Indications: Rx      nystatin (MYCOSTATIN) topical cream Apply  to affected area two (2) times a day. (Patient taking differently: Apply 1 g to affected area two (2) times a day.) 30 g 6    diclofenac potassium (ZIPSOR) 25 mg capsule Take 25 mg by mouth daily.  cetirizine (ZYRTEC) 10 mg tablet Take 1 Tab by mouth daily.  erenumab-aooe (AIMOVIG AUTOINJECTOR) 70 mg/mL injection 70 mg by SubCUTAneous route daily as needed for Pain (migraine). Indications: 140 mg      nitroglycerin (NITROSTAT) 0.4 mg SL tablet 1 tab      testosterone cypionate (DEPOTESTOTERONE CYPIONATE) 200 mg/mL injection 300 mg by IntraMUSCular route. Indications: every 2 weeks      primidone (MYSOLINE) 50 mg tablet Take 50 mg by mouth nightly. Indications: Essential Tremor      B.infantis-B.ani-B.long-B.bifi (PROBIOTIC 4X) 10-15 mg TbEC Take 1 Cap by mouth daily.       onabotulinumtoxinA (BOTOX INJECTION) by IntraMUSCular route every three (3) months. Indications: Last one Mid November      Omeprazole delayed release (PRILOSEC D/R) 20 mg tablet Take 40 mg by mouth two (2) times a day.  atorvastatin (LIPITOR) 20 mg tablet Take 40 mg by mouth nightly.       clotrimazole-betamethasone (LOTRISONE) topical cream apply topically twice a day to affected area RUB IN WELL KEEP AREA DRY      Ubrelvy 50 mg tablet take 1 tablet by mouth if needed may repeat in 2 hours  MG IN 24 HOURS          Review of Symptoms:       General - No history or complaints of unexpected fever or chills  Head/Neck - No history or complaints of headache or dizziness  Cardiac - No history or complaints of chest pain, palpitations, or shortness of breath  Pulmonary - No history or complaints of shortness of breath or productive cough  Gastrointestinal - as noted above  Genitourinary - No history or complaints of hematuria/dysuria or renal lithiasis  Musculoskeletal - No history or complaints of joint  muscular weakness  Hematologic - No history of any bleeding episodes  Neurologic - No history or complaints of  migraine headaches or neurologic symptoms                     Objective:     Visit Vitals  /63 (BP 1 Location: Right arm, BP Patient Position: Sitting, BP Cuff Size: Adult long)   Pulse 75   Temp 97.8 °F (36.6 °C)   Ht 5' 9\" (1.753 m)   Wt 108.9 kg (240 lb)   SpO2 97%   BMI 35.44 kg/m²        Physical Exam:      General appearance:  alert, cooperative, no distress, appears stated age   Mental status   alert, oriented to person, place, and time   Neck  supple, no significant adenopathy     Lymphatics  no palpable lymphadenopathy, no hepatosplenomegaly   Chest  clear to auscultation, no wheezes, rales or rhonchi, symmetric air entry   Heart  normal rate, regular rhythm, normal S1, S2, no murmurs, rubs, clicks or gallops    Abdomen: soft, nontender, nondistended, no masses or organomegaly   Incision:  well healed      Neurological  alert, oriented, normal speech, no focal findings or movement disorder noted   Musculoskeletal no joint tenderness, deformity or swelling   Extremities peripheral pulses normal, no pedal edema, no clubbing or cyanosis   Skin normal coloration and turgor, no rashes, no suspicious skin lesions noted      Lab Results   Component Value Date/Time    WBC 11.3 12/03/2019 03:20 AM    HGB 10.5 (L) 12/03/2019 03:20 AM    HCT 33.3 (L) 12/03/2019 03:20 AM    PLATELET 866 21/26/9238 03:20 AM    MCV 84.3 12/03/2019 03:20 AM     Lab Results   Component Value Date/Time    Sodium 141 12/03/2019 03:20 AM    Potassium 4.0 12/03/2019 03:20 AM    Chloride 109 12/03/2019 03:20 AM    CO2 26 12/03/2019 03:20 AM    Anion gap 6 12/03/2019 03:20 AM    Glucose 85 12/03/2019 03:20 AM    BUN 13 12/03/2019 03:20 AM    Creatinine 0.78 12/03/2019 03:20 AM    BUN/Creatinine ratio 17 12/03/2019 03:20 AM    GFR est AA >60 12/03/2019 03:20 AM    GFR est non-AA >60 12/03/2019 03:20 AM    Calcium 7.8 (L) 12/03/2019 03:20 AM    Bilirubin, total 0.3 11/05/2019 11:55 AM    Alk. phosphatase 46 11/05/2019 11:55 AM    Protein, total 5.6 (L) 11/05/2019 11:55 AM    Albumin 3.2 (L) 11/05/2019 11:55 AM    Globulin 2.4 11/05/2019 11:55 AM    A-G Ratio 1.3 11/05/2019 11:55 AM    ALT (SGPT) 45 11/05/2019 11:55 AM     Lab Results   Component Value Date/Time    Iron 17 (L) 02/12/2020 11:21 AM    Ferritin 8 02/12/2020 11:21 AM     Lab Results   Component Value Date/Time    Folate >20.0 (H) 02/12/2020 11:21 AM     Lab Results   Component Value Date/Time    Vitamin D 25-Hydroxy 67.1 02/12/2020 11:21 AM       Reviewed labs in [facility] from 2/15/21:  Hgb 13.9  K 4.1  Crt 0.87  LFTs WNL  Mg 2.1    Assessment and Plan:   1. Intestinal malabsorption  a. continue required Vitamins: B12, B complex, D, iron, calcium, multivitamin  2. S/p laparoscopic bariatric surgery, SLEEVE GASTRECTOMY, history of morbid obesity.  We reviewed general guidelines to help him lose the weight he has gained. Needs to cut out sugars from soda and be mindful of protein bars. Recommend using food tracking yeimi to help ensure adequate protein of 80-90g, keep carbs below 50g and aim for 800-1000 calories daily while not able to exercise. a. Sleep goal is 7-9 hours each night. Patient education given on the effects of sleep deprivation on weight control. b. Discussed patients weight loss goals and dietary choices in relation to goals. c. Reminded to measure portions, continue high protein, low carbohydrate diet. Reminded to eat regularly, to eat slowly & not to drink with meals. d. Continue cardio exercise and add resistance exercises. 60-90 minutes of aerobic activity 5 days a week and strength training 2 days each week. e. Encouraged to attend support group   f. Required fluid intake is >64oz daily of decaffeinated sugar free beverages. 3. Constipation - add Miralax 1-2 daily, can use MOM  4. Anemia - f/u [facility], highly encouraged colonoscopy, we discussed that his hemoglobin should not be in the 8s  5. Intertriginous dermatitis - sent nystatin refill, am concerned about possible systemic antifungal appearance of rash on arms and ears, f/u dermatology and has oncology f/u    Labs ordered today  Follow up in 6 months or sooner if patient has questions, concerns or worsening of condition, if unable to reach our office, patient should report to the ED. Mr. Arthur Newman has a reminder for a \"due or due soon\" health maintenance. I have asked that he contact his primary care provider for a follow-up on this health maintenance. Total time spent with the patient 30 minutes.

## 2021-04-21 DIAGNOSIS — F31.61 BIPOLAR AFFECTIVE DISORDER, MIXED, MILD (HCC): ICD-10-CM

## 2021-04-21 RX ORDER — GABAPENTIN 600 MG/1
TABLET ORAL
Qty: 150 TAB | Refills: 5 | Status: SHIPPED | OUTPATIENT
Start: 2021-04-21 | End: 2021-10-04 | Stop reason: SDUPTHER

## 2021-04-21 RX ORDER — DULOXETIN HYDROCHLORIDE 60 MG/1
60 CAPSULE, DELAYED RELEASE ORAL DAILY
Qty: 30 CAP | Refills: 5 | Status: SHIPPED | OUTPATIENT
Start: 2021-04-21 | End: 2021-10-04 | Stop reason: SDUPTHER

## 2021-04-21 RX ORDER — RISPERIDONE 2 MG/1
2 TABLET, FILM COATED ORAL
Qty: 30 TAB | Refills: 5 | Status: SHIPPED | OUTPATIENT
Start: 2021-04-21 | End: 2021-10-04 | Stop reason: SDUPTHER

## 2021-04-21 RX ORDER — OXCARBAZEPINE 300 MG/1
300 TABLET, FILM COATED ORAL DAILY
Qty: 30 TAB | Refills: 5 | Status: SHIPPED | OUTPATIENT
Start: 2021-04-21 | End: 2021-05-06

## 2021-04-21 RX ORDER — QUETIAPINE FUMARATE 300 MG/1
300 TABLET, FILM COATED ORAL
Qty: 30 TAB | Refills: 5 | Status: SHIPPED | OUTPATIENT
Start: 2021-04-21 | End: 2021-10-04 | Stop reason: SDUPTHER

## 2021-04-21 RX ORDER — TRAZODONE HYDROCHLORIDE 150 MG/1
300 TABLET ORAL
Qty: 60 TAB | Refills: 5 | Status: SHIPPED | OUTPATIENT
Start: 2021-04-21 | End: 2021-10-04 | Stop reason: SDUPTHER

## 2021-04-21 RX ORDER — BUPROPION HYDROCHLORIDE 300 MG/1
300 TABLET ORAL
Qty: 30 TAB | Refills: 5 | Status: SHIPPED | OUTPATIENT
Start: 2021-04-21 | End: 2021-10-04 | Stop reason: SDUPTHER

## 2021-05-06 ENCOUNTER — TELEPHONE (OUTPATIENT)
Dept: PSYCHIATRY | Age: 51
End: 2021-05-06

## 2021-05-06 RX ORDER — OXCARBAZEPINE 300 MG/1
300 TABLET, FILM COATED ORAL 2 TIMES DAILY
Qty: 30 TAB | Refills: 5 | Status: SHIPPED
Start: 2021-05-06 | End: 2021-05-20 | Stop reason: SDUPTHER

## 2021-05-20 RX ORDER — OXCARBAZEPINE 300 MG/1
300 TABLET, FILM COATED ORAL 2 TIMES DAILY
Qty: 60 TABLET | Refills: 5 | Status: SHIPPED | OUTPATIENT
Start: 2021-05-20 | End: 2021-08-12 | Stop reason: SDUPTHER

## 2021-06-21 NOTE — MR AVS SNAPSHOT
[address] [facility] 
 
 
 [address] Villaf[address] [facility] 
[phone] Patient: Adriana Cerna MRN: [id number] :1970 Visit Information Date & Time Provider Department Dept. Phone Encounter #  
 2018  2:30 PM [facility] [phone] [id number] Your Appointments 2018  2:30 PM  
NUTRITION COUNSELING with TSS NUTRI VISIT PEN Romayne D[facility] ([facility]) Appt Note: f/u check for [facility] care [address] Stefano Aldridge [place] [address]  
  
   
 [address] [facility] Upcoming Health Maintenance Date Due HEMOGLOBIN A1C Q6M 1970 LIPID PANEL Q1 1970 FOOT EXAM Q1 10/13/1978 MICROALBUMIN Q1 10/13/1978 EYE EXAM RETINAL OR DILATED Q1 10/13/1978 Pneumococcal 19-64 Medium Risk (1 of 1 - PPSV23) 10/13/1987 DTaP/Tdap/Td series (1 - Tdap) 10/13/1989 Influenza Age 5 to Adult 2018 Allergies as of 2018  Review Complete On: 2018 By: Chuck Garcia Severity Noted Reaction Type Reactions Sudafed [Pseudoephedrine Hcl] Low 2018    Other (comments) Pt advised not to take due to stroke in aug '16 Current Immunizations  Never Reviewed No immunizations on file. Not reviewed this visit Vitals Height(growth percentile) Weight(growth percentile) BMI Smoking Status 5' 9\" (1.753 m) 322 lb (146.1 kg) 47.55 kg/m2 Never Smoker BMI and BSA Data Body Mass Index Body Surface Area  
 47.55 kg/m 2 2.67 m 2 Preferred Pharmacy Pharmacy Name Phone [address] [facility], [address] [phone] Your Updated Medication List  
  
   
This list is accurate as of 18  2:22 PM.  Always use your most recent med list.  
  
  
  
  
 aspirin 81 mg chewable tablet Take 81 mg by mouth daily. buPROPion  mg XL tablet Commonly known as:  Santiago Corpus Take 1 Tab by mouth every morning. diclofenac potassium 25 mg capsule Commonly known as:  [employer] Take 25 mg by mouth four (4) times daily. DIOVAN 80 mg tablet Generic drug:  valsartan Take  by mouth daily. DULoxetine 60 mg capsule Commonly known as:  CYMBALTA Take 1 Cap by mouth daily. gabapentin 600 mg tablet Commonly known as:  NEURONTIN Take 2 Tabs by mouth two (2) times a day. And 1 po at lunch  Indications: NEUROPATHIC PAIN  
  
 IMURAN 50 mg tablet Generic drug:  azaTHIOprine Take 50 mg by mouth daily. LIPITOR 20 mg tablet Generic drug:  atorvastatin Take  by mouth daily. metFORMIN 500 mg tablet Commonly known as:  GLUCOPHAGE Take 1 Tab by mouth two (2) times daily (with meals). Omeprazole delayed release 20 mg tablet Commonly known as:  PRILOSEC D/R Take 20 mg by mouth daily. OXcarbaxepine 600 mg tablet Commonly known as:  TRILEPTAL Take 2 Tabs by mouth two (2) times a day. QUEtiapine 300 mg tablet Commonly known as:  SEROquel Take 1 Tab by mouth nightly. Indications: BIPOLAR DISORDER IN REMISSION  
  
 risperiDONE 4 mg tablet Commonly known as:  RisperDAL Take 1 Tab by mouth nightly. Indications: MIXED BIPOLAR I DISORDER  
  
 testosterone cypionate 200 mg/mL injection Commonly known as:  DEPOTESTOTERONE CYPIONATE  
by IntraMUSCular route once. traZODone 50 mg tablet Commonly known as:  Kathleen Landryman Take 1 Tab by mouth nightly as needed for Sleep. To-Do List   
 09/12/2018 2:00 PM  
  Appointment with Santa Perez MD at [address] [place] [address] ([phone]) Patient Instructions Goals: 
1. Time walk this month- continue 4-5 days per week. Find your time of walking base and increase as you can in 10 minutes increments as tolerated. 2. Make sure to use approved yogurts. 3. Can use a protein shake in the evening as your PM snack instead of carbohydrate food. 4. Try to eliminate sugar to help with fullness. Replace with protein items- check shopping list.  
5. Add vegetables at lunch and dinner everyday- microwave steamer bags. Introducing [facility]! [address] introduces ADOP patient portal. Now you can access parts of your medical record, email your doctor's office, and request medication refills online. 1. In your internet browser, go to [email or web address] 2. Click on the First Time User? Click Here link in the Sign In box. You will see the New Member Sign Up page. 3. Enter your ADOP Access Code exactly as it appears below. You will not need to use this code after youve completed the sign-up process. If you do not sign up before the expiration date, you must request a new code. · ADOP Access Code: [email or web address] Expires: 12/10/2018 12:04 PM 
 
4. Enter the last four digits of your Social Security Number (xxxx) and Date of Birth (mm/dd/yyyy) as indicated and click Submit. You will be taken to the next sign-up page. 5. Create a ADOP ID. This will be your ADOP login ID and cannot be changed, so think of one that is secure and easy to remember. 6. Create a ADOP password. You can change your password at any time. 7. Enter your Password Reset Question and Answer. This can be used at a later time if you forget your password. 8. Enter your e-mail address. You will receive e-mail notification when new information is available in [address] 9. Click Sign Up. You can now view and download portions of your medical record. 10. Click the Download Summary menu link to download a portable copy of your medical information. If you have questions, please visit the Frequently Asked Questions section of the ADOP website.  Remember, ADOP is NOT to be used for urgent needs. For medical emergencies, dial 911. Now available from your iPhone and Android! Please provide this summary of care documentation to your next provider. Your primary care clinician is listed as Lilly France. If you have any questions after today's visit, please call [phone]. no

## 2021-08-12 ENCOUNTER — HOSPITAL ENCOUNTER (OUTPATIENT)
Dept: BEHAVIORAL/MENTAL HEALTH | Age: 51
Discharge: HOME | End: 2021-08-12
Payer: COMMERCIAL

## 2021-08-12 PROCEDURE — 99214 OFFICE O/P EST MOD 30 MIN: CPT | Performed by: PSYCHIATRY & NEUROLOGY

## 2021-08-12 RX ORDER — OXCARBAZEPINE 300 MG/1
300 TABLET, FILM COATED ORAL 3 TIMES DAILY
Qty: 90 TABLET | Refills: 2 | Status: SHIPPED | OUTPATIENT
Start: 2021-08-12 | End: 2021-12-15 | Stop reason: SDUPTHER

## 2021-08-12 NOTE — PROGRESS NOTES
INTERVAL HISTORY (In Person):  Mr. 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 He'd been much more stable for some time and we began to decrease the meds by dropping the Trileptal, and decreasing the Risperidone from 4 mg to 2 mg secondary to a gastric sleeve procedure done in early Feb 2019. We've since restarted the Trileptal (initially just 300 mg daily) due to some agitation and dysphoria, and more recently increased the Trazodone to 300 mg due to worsened insomnia. Since the last appointment, we've increased the Trileptal to 300 mg bid, and just last week to a tid schedule.     He comes in today and states that he's feeling \"much better\" affectively with the increased dose of Trileptal (900 mg/day), and that his mood/affect has improved on it. He's no longer feeling irritable or easily agitated and \"snappy\" with others. He's very pleased with how he's feeling and wants to continue on the current regimen. Hasn't had any flare up of depression or dysphoria, nor has he had any manic sx's in many years. N/V functioning has also been fairly good--sleeping better with the increased dose of Trazodone.         CURRENT MEDICATIONS:  1.  Risperidone 2 mg qhs  2.  Seroquel 300 mg qhs  3.  Cymbalta 60 mg daily  4.  Wellbutrin  mg daily  5.  Trileptal 300 mg tid  6.  Gabapentin 1200 mg bid and 600 mg at lunch  7.  Trazodone 300 mg qhs prn for sleep      MENTAL STATUS EXAM:  Mr. Alejandro Salazar was noted to be a casually dressed, adequately groomed 24-year-old who appeared his stated age.  He was very pleasant and cooperative, and exhibited a fairly full affective range without any notable depressive sx's or mood instability. He denied feeling hopeless or having any suicidal thoughts at all and there was no evidence of any zoe or hypomania either. He also denied any of the irritability or agitation, or any symptoms of anxiety at this point. There was no evidence of any psychosis such as hallucinations or delusions, and his judgment and insight appeared to be fairly good.  His cognitive functioning was relatively stable and unchanged, with fewer deficits noted in the areas of concentration and attention span.      DIAGNOSTIC IMPRESSION:  Axis I     1.  Bipolar Disorder, depressed, in partial remission (F31.75). Leanna Lloyd of Alcohol Use Disorder in remission for many years (F10.21). Axis II     Deferred. 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     PLAN:  1.  Continue the Risperidone at 2 mg qhs--has 2 months of refills (30 day). 2.  Continue the Trileptal at 300 mg tid--#90 with 2 refills (30 day).    3.  Continue the Gabapentin at 1200/600/1200 (600 mg)--has 2 months of refills (30 day). 4.  Continue the Trazodone at 300 mg qhs prn--has 2 months of refills (30 day). 5.  Continue the Seroquel, Wellbutrin XL, and Cymbalta--has 2 months of refills on each med (30 day).   6.  Follow up with me in the next 3 months, sooner if needed. Will need refills before then.

## 2021-10-04 DIAGNOSIS — F31.61 BIPOLAR AFFECTIVE DISORDER, MIXED, MILD (HCC): ICD-10-CM

## 2021-10-04 RX ORDER — TRAZODONE HYDROCHLORIDE 150 MG/1
300 TABLET ORAL
Qty: 60 TABLET | Refills: 5 | Status: SHIPPED | OUTPATIENT
Start: 2021-10-04 | End: 2022-05-26 | Stop reason: SDUPTHER

## 2021-10-04 RX ORDER — RISPERIDONE 2 MG/1
2 TABLET, FILM COATED ORAL
Qty: 30 TABLET | Refills: 5 | Status: SHIPPED | OUTPATIENT
Start: 2021-10-04 | End: 2022-05-26 | Stop reason: SDUPTHER

## 2021-10-04 RX ORDER — DULOXETIN HYDROCHLORIDE 60 MG/1
60 CAPSULE, DELAYED RELEASE ORAL DAILY
Qty: 30 CAPSULE | Refills: 5 | Status: SHIPPED | OUTPATIENT
Start: 2021-10-04 | End: 2022-03-28 | Stop reason: SDUPTHER

## 2021-10-04 RX ORDER — GABAPENTIN 600 MG/1
TABLET ORAL
Qty: 150 TABLET | Refills: 5 | Status: SHIPPED | OUTPATIENT
Start: 2021-10-04 | End: 2022-02-04 | Stop reason: SDUPTHER

## 2021-10-04 RX ORDER — QUETIAPINE FUMARATE 300 MG/1
300 TABLET, FILM COATED ORAL
Qty: 30 TABLET | Refills: 5 | Status: SHIPPED | OUTPATIENT
Start: 2021-10-04 | End: 2022-03-28 | Stop reason: SDUPTHER

## 2021-10-04 RX ORDER — BUPROPION HYDROCHLORIDE 300 MG/1
300 TABLET ORAL
Qty: 30 TABLET | Refills: 5 | Status: SHIPPED | OUTPATIENT
Start: 2021-10-04 | End: 2022-03-28 | Stop reason: SDUPTHER

## 2021-10-07 ENCOUNTER — TELEPHONE (OUTPATIENT)
Dept: PSYCHIATRY | Age: 51
End: 2021-10-07

## 2021-10-07 DIAGNOSIS — F31.62 MODERATE MIXED BIPOLAR I DISORDER (HCC): Primary | ICD-10-CM

## 2021-10-07 RX ORDER — DIAZEPAM 5 MG/1
5 TABLET ORAL
Qty: 60 TABLET | Refills: 2 | Status: SHIPPED | OUTPATIENT
Start: 2021-10-07 | End: 2022-02-04 | Stop reason: SDUPTHER

## 2021-11-29 ENCOUNTER — HOSPITAL ENCOUNTER (OUTPATIENT)
Dept: BEHAVIORAL/MENTAL HEALTH | Age: 51
Discharge: HOME | End: 2021-11-29
Payer: COMMERCIAL

## 2021-11-29 PROCEDURE — 99442 PR PHYS/QHP TELEPHONE EVALUATION 11-20 MIN: CPT | Performed by: PSYCHIATRY & NEUROLOGY

## 2021-11-29 NOTE — PROGRESS NOTES
Consent: The patient and/or their healthcare decision maker is aware that they may receive a bill for this audio only encounter, depending on their insurance coverage, and has provided verbal consent to proceed: Yes. INTERVAL HISTORY (Audio Only):  Mr. 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 He'd been much more stable for some time and we began to decrease the meds by dropping the Trileptal, and decreasing the Risperidone from 4 mg to 2 mg secondary to a gastric sleeve procedure done in early Feb 2019. We've since restarted the Trileptal (initially just 300 mg daily) due to some agitation and dysphoria, and more recently increased the Trazodone to 300 mg due to worsened insomnia. Since then, we've increased the Trileptal to 300 mg bid and then to a tid schedule, and changed the Clonazepam (rare use) to Diazepam at the 5 mg dose.     He states that he's feeling \"much better\" affectively, and that his mood has remained quite stable and euthymic. The slight flare-up of anxiety has subsided, and he hasn't needed to take the Diazepam much at all. Affectively, he's essentially at his baseline without any symptoms of depression or hypomania at all. He's been very pleased with the meds and denies having any SE's with any of them at this point. No longer feeling irritable, easily agitated, or \"snappy\" with others. He's very pleased with how he's feeling and wants to continue on the current regimen.  N/V functioning has also been fairly good--sleeping better with the increased dose of Trazodone.         CURRENT MEDICATIONS:  1.  Risperidone 2 mg qhs  2.  Seroquel 300 mg qhs  3.  Cymbalta 60 mg daily  4.  Wellbutrin  mg daily  5.  Trileptal 300 mg tid  6.  Gabapentin 1200 mg bid and 600 mg at lunch  7.  Trazodone 300 mg qhs prn for sleep  8. Diazepam 5 mg bid prn--rare use      MENTAL STATUS EXAM:  Mr. Bhaskar Poole was noted to be very pleasant and cooperative, and exhibited a full affective range without any notable depressive sx's or mood instability. He denied feeling hopeless or having any suicidal thoughts at all and there was no evidence of any zoe or hypomania either. He also denied any of the irritability or agitation, or any symptoms of anxiety at this point. There was no evidence of any psychosis such as hallucinations or delusions, and his judgment and insight appeared to be fairly good.  His cognitive functioning was relatively stable and unchanged, with fewer deficits noted in the areas of concentration and attention span.      DIAGNOSTIC IMPRESSION:  Axis I     1.  Bipolar Disorder, depressed, in partial remission (F31.75). Verlene Lofty of Alcohol Use Disorder in remission for many years (F10.21). Axis II     Deferred. 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     PLAN:  1.  Continue the Risperidone at 2 mg qhs--has 4 months of refills (30 day). 2.  Continue the Trileptal at 300 mg tid--#90 with 3 refills (30 day).    3.  Continue the Gabapentin at 1200/600/1200 (600 mg)--has 4 months of refills (30 day). 4.  Continue the Trazodone at 300 mg qhs prn--has 4 months of refills (30 day). 5.  Continue the Diazepam at 5 mg bid prn--has 1-2 month of refills (30 day)  6. Continue the Seroquel, Wellbutrin XL, and Cymbalta--has 4 months of refills on each med (30 day). 7.  Follow up with me in 4 months, sooner if needed.     I affirm this is a Patient-Initiated-Episode with a patient who has not had a related appointment within my department in the past 7 days or scheduled within the next 24 hours.   Total Time: 14 minutes  Note: not billable if the call serves to triage the patient into an appointment for the relevant concern. Patient was evaluated by phone call and had no access to a computer or smart phone. Chart reviewed. Evaluated and management per the note above. POS: patient at home; provider in office.

## 2021-12-15 RX ORDER — OXCARBAZEPINE 300 MG/1
300 TABLET, FILM COATED ORAL 3 TIMES DAILY
Qty: 90 TABLET | Refills: 5 | Status: SHIPPED | OUTPATIENT
Start: 2021-12-15 | End: 2022-06-06 | Stop reason: SDUPTHER

## 2022-01-10 ENCOUNTER — DOCUMENTATION ONLY (OUTPATIENT)
Dept: SURGERY | Age: 52
End: 2022-01-10

## 2022-01-10 NOTE — PROGRESS NOTES
Per [facility] requirements;  E-mail and letter sent for follow up appointment. [facility]  [address] [place], [address] [facility] [employer] [facility] [place]  [facility]  [facility]      Dear Patient,    Your health is our main concern. It is important for your health to have follow-up lab work and to see your surgeon at 2 months, 4 months, 6 months, 9 months and annually after your weight loss surgery. Additionally, the Department of Bariatric Surgery at our hospital is a member of the Metabolic and Bariatric Surgery Accreditation and Quality Improvement Program [facility]). As a participant in this program, we gather information on the outcomes of our patients after surgery. Please call the office for a follow up appointment at [phone]. If you have moved out of the area or have changed surgeons please call us and let us know the name of your doctor. Your health and feedback are important to us. We greatly appreciate your response.        Thank you,  [facility] [facility]

## 2022-02-04 DIAGNOSIS — F31.62 MODERATE MIXED BIPOLAR I DISORDER (HCC): ICD-10-CM

## 2022-02-04 DIAGNOSIS — F31.61 BIPOLAR AFFECTIVE DISORDER, MIXED, MILD (HCC): ICD-10-CM

## 2022-02-04 RX ORDER — GABAPENTIN 600 MG/1
1200 TABLET ORAL 3 TIMES DAILY
Qty: 180 TABLET | Refills: 5 | Status: SHIPPED | OUTPATIENT
Start: 2022-02-04 | End: 2022-09-15 | Stop reason: SDUPTHER

## 2022-02-04 RX ORDER — DIAZEPAM 5 MG/1
5 TABLET ORAL
Qty: 90 TABLET | Refills: 5 | Status: SHIPPED | OUTPATIENT
Start: 2022-02-04 | End: 2022-03-28 | Stop reason: SDUPTHER

## 2022-03-02 ENCOUNTER — HOSPITAL ENCOUNTER (OUTPATIENT)
Dept: BEHAVIORAL/MENTAL HEALTH | Age: 52
Discharge: HOME | End: 2022-03-02
Payer: COMMERCIAL

## 2022-03-02 PROCEDURE — 99443 PR PHYS/QHP TELEPHONE EVALUATION 21-30 MIN: CPT | Performed by: PSYCHIATRY & NEUROLOGY

## 2022-03-02 NOTE — PROGRESS NOTES
Consent: The patient and/or their healthcare decision maker is aware that they may receive a bill (including co-pays) for this audio only encounter, depending on their insurance coverage, and has provided verbal consent to proceed. The patient was located in [place], where I am licensed to provide care     INTERVAL HISTORY (Audio Only):  Mr. 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 He'd been much more stable for some time and we began to decrease the meds by dropping the Trileptal, and decreasing the Risperidone from 4 mg to 2 mg secondary to a gastric sleeve procedure done in early Feb 2019. We've since restarted the Trileptal (initially just 300 mg daily) due to some agitation and dysphoria, and more recently increased the Trazodone to 300 mg due to worsened insomnia. Since then, we've increased the Trileptal to 300 mg bid and then to a tid schedule, and changed the Clonazepam (rare use) to Diazepam at the 5 mg dose. One month ago, I increased the GBP and the Diazepam at the request of his Pain MD.     He states that he's feeling \"pretty good\" and definitely better since we increased the Diazepam a month ago (hasn't received the increased GBP script yet). The Diazepam helps relax his neck and core muscles, and he's able to sleep better with it now because of that. . His pain issues are clearly having a major impact on him, but he feels he's coping with it as well as possible. He's also connected to a Pain MD and hopes other Tx's can be implemented. His mood has remained stable and mostly euthymic, and he hasn't had any worsening of anxiety. Affectively, he's essentially at his baseline without any symptoms of depression or hypomania at all.  He's still pleased with the meds and denies having any SE's with any of them at this point.          CURRENT MEDICATIONS:  1.  Risperidone 2 mg qhs  2.  Seroquel 300 mg qhs  3.  Cymbalta 60 mg daily  4.  Wellbutrin  mg daily  5.  Trileptal 300 mg tid  6.  Gabapentin 1200 mg tid  7.  Trazodone 300 mg qhs prn for sleep  8. Diazepam 5 mg tid prn--taking 2/day most days  9. Taking Percocet 5/325--1/day only      MENTAL STATUS EXAM:  Mr. Grayson Beaver was noted to be very pleasant and cooperative, and exhibited a slightly restricted affective range this time. He hasn't had any notable depressive sx's or any mood instability, and he denied feeling hopeless or having any suicidal thoughts at all. There was no evidence of any zoe or hypomania, and he also denied any of the irritability or agitation, or any symptoms of anxiety at this point. There was no evidence of any psychosis such as hallucinations or delusions, and his judgment and insight appeared to be fairly good.  His cognitive functioning was relatively stable and unchanged, with fewer deficits noted in the areas of concentration and attention span.      DIAGNOSTIC IMPRESSION:  Axis I     1.  Bipolar Disorder, depressed, in partial remission (F31.75). Shellie Pierini Use Disorder in remission for many years (F10.21). Axis II     Deferred. 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     PLAN:  1.  Continue the Risperidone at 2 mg qhs--has 2 months of refills (30 day). 2.  Continue the Trileptal at 300 mg tid--has 3.5 months of refills (30 day).    3.  Continue the Gabapentin at 1200 mg tid--has 5+ months of refills (30 day). 4.  Continue the Trazodone at 300 mg qhs prn--has 3 months of refills (30 day). 5.  Continue the Diazepam at 5 mg tid prn--has 5 months of refills (30 day).   6.  Continue the Seroquel, Wellbutrin XL, and Cymbalta--has 1-2 months of refills on each med (30 day). 7.  Follow up with me in 4 months, sooner if needed.      I affirm this is a Patient-Initiated-Episode with a patient who has not had a related appointment within my department in the past 7 days or scheduled within the next 24 hours. Total Time: 23 minutes  Note: not billable if the call serves to triage the patient into an appointment for the relevant concern. Patient was evaluated by phone call and had no access to a computer or smart phone. Chart reviewed. Evaluated and management per the note above. POS: patient at home; provider in office.

## 2022-03-18 PROBLEM — M17.11 PRIMARY LOCALIZED OSTEOARTHRITIS OF RIGHT KNEE: Status: ACTIVE | Noted: 2019-11-21

## 2022-03-19 PROBLEM — F31.61 BIPOLAR AFFECTIVE DISORDER, MIXED, MILD (HCC): Status: ACTIVE | Noted: 2017-07-07

## 2022-03-19 PROBLEM — E61.1 IRON DEFICIENCY: Status: ACTIVE | Noted: 2020-02-19

## 2022-03-19 PROBLEM — B37.2 INTERTRIGINOUS CANDIDIASIS: Status: ACTIVE | Noted: 2019-08-08

## 2022-03-19 PROBLEM — Z98.84 S/P LAPAROSCOPIC SLEEVE GASTRECTOMY: Status: ACTIVE | Noted: 2019-02-20

## 2022-03-19 PROBLEM — F10.21 ALCOHOL DEPENDENCE IN REMISSION (HCC): Status: ACTIVE | Noted: 2017-07-07

## 2022-03-19 PROBLEM — K90.9 INTESTINAL MALABSORPTION: Status: ACTIVE | Noted: 2019-02-20

## 2022-03-28 DIAGNOSIS — F31.62 MODERATE MIXED BIPOLAR I DISORDER (HCC): ICD-10-CM

## 2022-03-28 RX ORDER — DULOXETIN HYDROCHLORIDE 60 MG/1
60 CAPSULE, DELAYED RELEASE ORAL DAILY
Qty: 30 CAPSULE | Refills: 5 | Status: SHIPPED | OUTPATIENT
Start: 2022-03-28 | End: 2022-09-15 | Stop reason: SDUPTHER

## 2022-03-28 RX ORDER — DIAZEPAM 5 MG/1
5 TABLET ORAL
Qty: 120 TABLET | Refills: 5 | Status: SHIPPED | OUTPATIENT
Start: 2022-03-28 | End: 2022-08-22 | Stop reason: SDUPTHER

## 2022-03-28 RX ORDER — QUETIAPINE FUMARATE 300 MG/1
300 TABLET, FILM COATED ORAL
Qty: 30 TABLET | Refills: 5 | Status: SHIPPED | OUTPATIENT
Start: 2022-03-28 | End: 2022-09-15 | Stop reason: SDUPTHER

## 2022-03-28 RX ORDER — BUPROPION HYDROCHLORIDE 300 MG/1
300 TABLET ORAL
Qty: 30 TABLET | Refills: 5 | Status: SHIPPED | OUTPATIENT
Start: 2022-03-28 | End: 2022-09-15 | Stop reason: SDUPTHER

## 2022-05-26 RX ORDER — RISPERIDONE 2 MG/1
2 TABLET, FILM COATED ORAL
Qty: 30 TABLET | Refills: 5 | Status: SHIPPED | OUTPATIENT
Start: 2022-05-26 | End: 2022-11-03 | Stop reason: SDUPTHER

## 2022-05-26 RX ORDER — TRAZODONE HYDROCHLORIDE 150 MG/1
300 TABLET ORAL
Qty: 60 TABLET | Refills: 5 | Status: SHIPPED | OUTPATIENT
Start: 2022-05-26

## 2022-06-06 RX ORDER — OXCARBAZEPINE 300 MG/1
300 TABLET, FILM COATED ORAL 3 TIMES DAILY
Qty: 90 TABLET | Refills: 5 | Status: SHIPPED | OUTPATIENT
Start: 2022-06-06 | End: 2022-11-03 | Stop reason: SDUPTHER

## 2022-07-05 ENCOUNTER — HOSPITAL ENCOUNTER (OUTPATIENT)
Dept: BEHAVIORAL/MENTAL HEALTH | Age: 52
Discharge: HOME | End: 2022-07-05
Payer: COMMERCIAL

## 2022-07-05 PROCEDURE — 99442 PR PHYS/QHP TELEPHONE EVALUATION 11-20 MIN: CPT | Performed by: PSYCHIATRY & NEUROLOGY

## 2022-07-05 NOTE — PROGRESS NOTES
Consent: The patient and/or their healthcare decision maker is aware that they may receive a bill (including co-pays) for this audio only encounter, depending on their insurance coverage, and has provided verbal consent to proceed. The patient was located in [place], where I am licensed to provide care     INTERVAL HISTORY (Audio Only):  Mr. 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 He'd been much more stable for some time and we began to decrease the meds by dropping the Trileptal, and decreasing the Risperidone from 4 mg to 2 mg secondary to a gastric sleeve procedure done in early Feb 2019. We've since restarted the Trileptal (initially just 300 mg daily) due to some agitation and dysphoria, and more recently increased the Trazodone to 300 mg due to worsened insomnia. Since then, we've increased the Trileptal to 300 mg bid and then to a tid schedule, and changed the Clonazepam (rare use) to Diazepam at the 5 mg dose. One month ago, I increased the GBP and the Diazepam at the request of his Pain MD.     He states that he's still been feeling \"pretty good\" overall, although he's been having a little more anxiety and mild irritability. He otherwise has still been affectively stable and close to euthymic, and he denies having had any episodes of dysphoria or hypomania. He never increased the GBP as instructed several months ago, and we discussed trying the higher dose to see if it helps with his chronic pain as well as the irritability. He denies having any SE's with the meds and is very comfortable staying with this regimen. His pain issues continue to have a major impact on him, but he feels he's coping with it as well as possible.  He's also connected to a Pain MD and hopes other Tx's can be implemented. Affectively, he's essentially at his baseline without any symptoms of depression or hypomania at all. He's still pleased with the meds and denies having any SE's with any of them at this point.          CURRENT MEDICATIONS:  1.  Risperidone 2 mg qhs  2.  Seroquel 300 mg qhs  3.  Cymbalta 60 mg daily  4.  Wellbutrin  mg daily  5.  Trileptal 300 mg tid  6.  Gabapentin 1200 mg tid--still taking only 5 pills/day  7.  Trazodone 300 mg qhs prn for sleep  8.  Diazepam 5 mg tid prn--taking 3/day most days  9. Taking Percocet 5/325--1/day only      MENTAL STATUS EXAM:  Mr. Daniele Esteban was noted to be very pleasant and cooperative, but exhibited a slightly restricted affective range this again time. He hasn't had any significant depressive sx's or any mood instability, and he denied feeling hopeless or having any suicidal thoughts at all. There was no evidence of any zoe or hypomania, and he also denied any of the irritability or agitation, or any symptoms of anxiety at this point. There was no evidence of any psychosis such as hallucinations or delusions, and his judgment and insight appeared to be fairly good.  His cognitive functioning was relatively stable and unchanged, with fewer deficits noted in the areas of concentration and attention span.      DIAGNOSTIC IMPRESSION:  Axis I     1.  Bipolar Disorder, depressed, in partial remission (F31.75). Christine Bloodgood Use Disorder in remission for many years (F10.21). Axis II     Deferred.   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     PLAN:  1.  Continue the Risperidone at 2 mg qhs--has 5 months of refills (30 day)  2.  Continue the Trileptal at 300 mg tid--has 5 months of refills (30 day)    3.  \"Increase\" the Gabapentin to 1200 mg tid--has 3 months of refills (30 day)   4.  Continue the Trazodone at 300 mg qhs prn--has 5 months of refills (30 day)  5.  Continue the Diazepam at 5 mg tid prn--has 3 months of refills (30 day)  6.  Continue the Seroquel, Wellbutrin XL, and Cymbalta--has 3 months of refills on each med (30 day)  7.  Follow up with me in 4 months, sooner if needed.      I affirm this is a Patient-Initiated-Episode with a patient who has not had a related appointment within my department in the past 7 days or scheduled within the next 24 hours. Total Time: 16 minutes  Note: not billable if the call serves to triage the patient into an appointment for the relevant concern. Patient was evaluated by phone call and had no access to a computer or smart phone. Chart reviewed. Evaluated and management per the note above. POS: patient at home; provider in office.

## 2022-08-22 ENCOUNTER — TELEPHONE (OUTPATIENT)
Dept: PSYCHIATRY | Age: 52
End: 2022-08-22

## 2022-08-22 DIAGNOSIS — F31.62 MODERATE MIXED BIPOLAR I DISORDER (HCC): ICD-10-CM

## 2022-08-22 RX ORDER — DIAZEPAM 10 MG/1
10 TABLET ORAL
Qty: 60 TABLET | Refills: 2 | Status: SHIPPED | OUTPATIENT
Start: 2022-08-22 | End: 2022-09-26 | Stop reason: SDUPTHER

## 2022-09-15 DIAGNOSIS — F31.61 BIPOLAR AFFECTIVE DISORDER, MIXED, MILD (HCC): ICD-10-CM

## 2022-09-15 RX ORDER — QUETIAPINE FUMARATE 300 MG/1
300 TABLET, FILM COATED ORAL
Qty: 30 TABLET | Refills: 5 | Status: SHIPPED | OUTPATIENT
Start: 2022-09-15

## 2022-09-15 RX ORDER — DULOXETIN HYDROCHLORIDE 60 MG/1
60 CAPSULE, DELAYED RELEASE ORAL DAILY
Qty: 30 CAPSULE | Refills: 5 | Status: SHIPPED | OUTPATIENT
Start: 2022-09-15

## 2022-09-15 RX ORDER — BUPROPION HYDROCHLORIDE 300 MG/1
300 TABLET ORAL
Qty: 30 TABLET | Refills: 5 | Status: SHIPPED | OUTPATIENT
Start: 2022-09-15

## 2022-09-15 RX ORDER — GABAPENTIN 600 MG/1
1200 TABLET ORAL 3 TIMES DAILY
Qty: 180 TABLET | Refills: 5 | Status: SHIPPED | OUTPATIENT
Start: 2022-09-15

## 2022-09-26 DIAGNOSIS — F31.62 MODERATE MIXED BIPOLAR I DISORDER (HCC): ICD-10-CM

## 2022-09-26 RX ORDER — DIAZEPAM 5 MG/1
5 TABLET ORAL
Qty: 90 TABLET | Refills: 2 | Status: SHIPPED | OUTPATIENT
Start: 2022-09-26 | End: 2022-11-03 | Stop reason: SDUPTHER

## 2022-11-03 ENCOUNTER — HOSPITAL ENCOUNTER (OUTPATIENT)
Dept: BEHAVIORAL/MENTAL HEALTH | Age: 52
Discharge: HOME | End: 2022-11-03
Payer: COMMERCIAL

## 2022-11-03 DIAGNOSIS — F31.62 MODERATE MIXED BIPOLAR I DISORDER (HCC): ICD-10-CM

## 2022-11-03 PROCEDURE — 99442 PR PHYS/QHP TELEPHONE EVALUATION 11-20 MIN: CPT | Performed by: PSYCHIATRY & NEUROLOGY

## 2022-11-03 RX ORDER — OXCARBAZEPINE 300 MG/1
300 TABLET, FILM COATED ORAL 3 TIMES DAILY
Qty: 90 TABLET | Refills: 4 | Status: SHIPPED | OUTPATIENT
Start: 2022-11-03

## 2022-11-03 RX ORDER — RISPERIDONE 2 MG/1
2 TABLET, FILM COATED ORAL
Qty: 30 TABLET | Refills: 4 | Status: SHIPPED | OUTPATIENT
Start: 2022-11-03

## 2022-11-03 RX ORDER — DIAZEPAM 5 MG/1
5 TABLET ORAL
Qty: 90 TABLET | Refills: 4 | Status: SHIPPED | OUTPATIENT
Start: 2022-11-03

## 2022-11-03 NOTE — PROGRESS NOTES
Consent: The patient and/or their healthcare decision maker is aware that they may receive a bill (including co-pays) for this audio only encounter, depending on their insurance coverage, and has provided verbal consent to proceed. The patient was located in [place], where I am licensed to provide care     INTERVAL HISTORY (Audio Only):  Mr. 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 He'd been much more stable for some time and we began to decrease the meds by dropping the Trileptal, and decreasing the Risperidone from 4 mg to 2 mg secondary to a gastric sleeve procedure done in early Feb 2019. We've since restarted the Trileptal (initially just 300 mg daily) due to some agitation and dysphoria, and more recently increased the Trazodone to 300 mg due to worsened insomnia. Since then, we've increased the Trileptal to 300 mg bid and then to a tid schedule, and changed the Clonazepam (rare use) to Diazepam at the 5 mg dose. Five months ago, I increased the GBP and the Diazepam at the request of his Pain MD, and last time he was feeling \"pretty good\". He states that he's still been feeling \"pretty good\" overall, although he's also still been dealing with a lot of pain. He's not gotten much relief despite seeing different MD's, and that will often make him irritable, but he feels that his mood/affect has actually been stable and relatively euthymic. He's not had any exacerbation of significant depression or zoe, although his mood can change somewhat die to stress. Tolerating the meds well--denies any EPSE's, sedation, TD sx's, etc. Feels comfortable staying with this regimen still.  Affectively, he's essentially at his baseline without any symptoms of depression or hypomania at all. CURRENT MEDICATIONS:  1. Risperidone 2 mg qhs  2. Seroquel 300 mg qhs  3. Cymbalta 60 mg daily  4. Wellbutrin  mg daily  5. Trileptal 300 mg tid  6. Gabapentin 1200 mg tid--still taking only 5 pills/day  7. Trazodone 300 mg qhs prn for sleep  8. Diazepam 5 mg tid prn--taking 3/day most days  9. Taking Percocet 5/325--1-2/day only      MENTAL STATUS EXAM:  Mr. Martha Hoffman was noted to be very pleasant and cooperative, and exhibited a ballard affective range this time. He hasn't had any significant depressive sx's or any overt mood instability, but he occasionally feels irritable at times. He denied feeling hopeless or having any suicidal thoughts at all. There was no evidence of any zoe or hypomania, and he also denied any of the irritability or agitation, or any symptoms of anxiety at this point. There was no evidence of any psychosis such as hallucinations or delusions, and his judgment and insight appeared to be fairly good. His cognitive functioning was relatively stable and unchanged, with fewer deficits noted in the areas of concentration and attention span. DIAGNOSTIC IMPRESSION:  Axis I     1. Bipolar Disorder, depressed, in partial remission (F31.75). 2.  Alcohol Use Disorder in remission for many years (F10.21). Axis II     Deferred. 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 PLAN:  1. Continue the Risperidone at 2 mg qhs--#30 with 4 refills (30 day)  2. Continue the Trileptal at 300 mg tid--#90 with 4 refills (30 day)    3. Continue the Gabapentin at 1200 mg tid--has 4+ months of refills (30 day)   4. Continue the Trazodone at 300 mg qhs prn--has 5 months of refills (30 day)  5. Continue the Diazepam at 5 mg tid prn--#90 with 4 refills (30 day)  6.   Continue the Seroquel, Wellbutrin XL, and Cymbalta--has 4+ months of refills on each med (30 day)  7. Follow up with me in 5 months, sooner if needed. I affirm this is a Patient-Initiated-Episode with a patient who has not had a related appointment within my department in the past 7 days or scheduled within the next 24 hours. Total Time: 16 minutes  Note: not billable if the call serves to triage the patient into an appointment for the relevant concern. Patient was evaluated by phone call and had no access to a computer or smart phone. Chart reviewed. Evaluated and management per the note above. POS: patient at home; provider in office.

## 2022-11-30 ENCOUNTER — TELEPHONE (OUTPATIENT)
Dept: PSYCHIATRY | Age: 52
End: 2022-11-30

## 2022-12-12 ENCOUNTER — TELEPHONE (OUTPATIENT)
Dept: PSYCHIATRY | Age: 52
End: 2022-12-12

## 2023-01-05 ENCOUNTER — TELEPHONE (OUTPATIENT)
Dept: PSYCHIATRY | Age: 53
End: 2023-01-05

## 2023-01-12 ENCOUNTER — DOCUMENTATION ONLY (OUTPATIENT)
Dept: SURGERY | Age: 53
End: 2023-01-12

## 2023-01-12 NOTE — PROGRESS NOTES
Per [facility] requirements;  E-mail and letter sent for follow up appointment. [facility]  [address]   [address], [address]                 [facility] [place]  [facility]  [facility]      Dear Patient,    Your health is our main concern. It is important for your health to have follow-up lab work and to see your surgeon at 3 months, 6 months, 9 months and annually after your weight loss surgery. Additionally, the Department of Bariatric Surgery at our hospital is a member of the Metabolic and Bariatric Surgery Accreditation and Quality Improvement Program [facility]). As a participant in this program, we gather information on the outcomes of our patients after surgery. Please call the office for a follow up appointment at [phone]. If you have moved out of the area or have changed surgeons please call us and let us know the name of your doctor. Your health and feedback are important to us. We greatly appreciate your response.        Thank you,  [facility] [address] [facility]

## 2023-01-24 ENCOUNTER — HOSPITAL ENCOUNTER (OUTPATIENT)
Dept: BEHAVIORAL/MENTAL HEALTH | Age: 53
Discharge: HOME | End: 2023-01-24
Payer: COMMERCIAL

## 2023-01-24 PROCEDURE — 99214 OFFICE O/P EST MOD 30 MIN: CPT | Performed by: PSYCHIATRY & NEUROLOGY

## 2023-01-24 RX ORDER — OXCARBAZEPINE 600 MG/1
600 TABLET, FILM COATED ORAL 2 TIMES DAILY
Qty: 60 TABLET | Refills: 7 | Status: SHIPPED | OUTPATIENT
Start: 2023-01-24

## 2023-01-24 RX ORDER — TRAZODONE HYDROCHLORIDE 150 MG/1
300 TABLET ORAL
Qty: 60 TABLET | Refills: 7 | Status: SHIPPED | OUTPATIENT
Start: 2023-01-24

## 2023-01-24 NOTE — PROGRESS NOTES
INTERVAL HISTORY (In Person):  Mr. 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 He'd been much more stable for some time and we began to decrease the meds by dropping the Trileptal, and decreasing the Risperidone from 4 mg to 2 mg secondary to a gastric sleeve procedure done in early Feb 2019. We've since restarted the Trileptal (initially just 300 mg daily) due to some agitation and dysphoria, and more recently increased the Trazodone to 300 mg due to worsened insomnia. Since then, we've increased the Trileptal to 300 mg bid and then to a tid schedule, and changed the Clonazepam (rare use) to Diazepam at the 5 mg dose. Eight months ago, I increased the GBP and the Diazepam at the request of his Pain MD, and he was feeling \"pretty good\" the next time. However, over the past 4-5 months he's become much more lucas and irritable, some of which is likely fueled by his chronic pain. He comes in today with his wife and states that he's still been struggling a bit with some mood instability and irritability, but it's not been quite as bad. His wife also seems to feel that he's not been as irritable has he had been, but there's still room for improvement. He's still in a lot of pain and that's undoubtedly causing much (if not all) of the agitation and moodiness. He does feel that when the GBP was increased slightly, that it may have helped him feel calmer, but it's still too early to tell if stopping the WB XL will help. No overt zoe or depression, and no psychosis. He doesn't appear to be as lethargic or ataxic as he has at other times (by verbal report), but we discussed those risks and ways to be cautious when ambulating, etc. No overt SE's with the meds.  Will try to increase the Trileptal slightly to 600 mg bid and give more time off the WB XL to see if he's more consistently calmer. The biggest issue remains his pain and whether that can be reduced or not. CURRENT MEDICATIONS:  1. Risperidone 2 mg qhs  2. Seroquel 300 mg qhs  3. Cymbalta 60 mg daily  4.   300 mg daily--off this 2-3 weeks  5. Trileptal 300 mg tid  6. Gabapentin 1200 mg tid  7. Trazodone 300 mg qhs prn for sleep  8. Diazepam 5 mg tid prn--taking 3/day most days  9. Taking Percocet 5/325--1-2/day only      MENTAL STATUS EXAM:  Mr. Terrence Bence was noted to be a casually dressed, adequately groomed 68-year-old who appeared his stated age. His wife was also present. He was very pleasant and cooperative, and exhibited a full affective range again this time. He hasn't had any significant depressive sx's or any overt zoe, but he has had some instability and irritability. He denied feeling hopeless or having any suicidal thoughts at all. There was also no evidence of any anxiety at this point. There was no evidence of any psychosis such as hallucinations or delusions, and his judgment and insight appeared to be fairly good. His cognitive functioning was relatively stable and unchanged, with fewer deficits noted in the areas of concentration and attention span. DIAGNOSTIC IMPRESSION:  Axis I     1. Bipolar Disorder, depressed, mild (F31.61). 2.  Alcohol Use Disorder in remission for many years (F10.21). Axis II     Deferred. 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 PLAN:  1. Continue the Risperidone at 2 mg qhs--has 2+ months of refills (30 day)  2. Increase the Trileptal to 600 mg bid--#60 with 7 refills (30 day)    3. Continue the Gabapentin at 1200 mg tid--has 1.5 months of refills (30 day)   4.   Continue the Trazodone at 300 mg qhs prn--#60 with 7 refills (30 day)  5. Continue the Diazepam at 5 mg tid prn--has 2+ months of refills (30 day)  6. Continue the Seroquel at 300 mg qhs--has 1.5 months of refills (30 day)  7. Continue the Cymbalta at 60 mg daily--has 1.5 months of refills (30 day)  8. Follow up with me in 3 months, sooner if needed.

## 2023-02-28 ENCOUNTER — OFFICE VISIT (OUTPATIENT)
Age: 53
End: 2023-02-28

## 2023-02-28 ENCOUNTER — HOSPITAL ENCOUNTER (OUTPATIENT)
Facility: HOSPITAL | Age: 53
Discharge: HOME | End: 2023-03-03
Payer: COMMERCIAL

## 2023-02-28 VITALS
SYSTOLIC BLOOD PRESSURE: 121 MMHG | WEIGHT: 279.8 LBS | BODY MASS INDEX: 41.44 KG/M2 | HEIGHT: 69 IN | TEMPERATURE: 97.5 F | DIASTOLIC BLOOD PRESSURE: 77 MMHG | OXYGEN SATURATION: 95 % | HEART RATE: 89 BPM

## 2023-02-28 DIAGNOSIS — E61.1 IRON DEFICIENCY: ICD-10-CM

## 2023-02-28 DIAGNOSIS — Z98.84 S/P LAPAROSCOPIC SLEEVE GASTRECTOMY: ICD-10-CM

## 2023-02-28 DIAGNOSIS — B37.2 INTERTRIGINOUS CANDIDIASIS: ICD-10-CM

## 2023-02-28 DIAGNOSIS — K90.9 INTESTINAL MALABSORPTION, UNSPECIFIED TYPE: Primary | ICD-10-CM

## 2023-02-28 DIAGNOSIS — K90.9 INTESTINAL MALABSORPTION, UNSPECIFIED TYPE: ICD-10-CM

## 2023-02-28 DIAGNOSIS — K21.9 GASTROESOPHAGEAL REFLUX DISEASE, UNSPECIFIED WHETHER ESOPHAGITIS PRESENT: ICD-10-CM

## 2023-02-28 LAB
25(OH)D3 SERPL-MCNC: 102.9 NG/ML (ref 30–100)
ALBUMIN SERPL-MCNC: 3.4 G/DL (ref 3.4–5)
ALBUMIN/GLOB SERPL: 1.1 (ref 0.8–1.7)
ALP SERPL-CCNC: 65 U/L (ref 45–117)
ALT SERPL-CCNC: 28 U/L (ref 16–61)
ANION GAP SERPL CALC-SCNC: 6 MMOL/L (ref 3–18)
AST SERPL-CCNC: 15 U/L (ref 10–38)
BASOPHILS # BLD: 0.1 K/UL (ref 0–0.1)
BASOPHILS NFR BLD: 1 % (ref 0–2)
BILIRUB SERPL-MCNC: 0.1 MG/DL (ref 0.2–1)
BUN SERPL-MCNC: 14 MG/DL (ref 7–18)
BUN/CREAT SERPL: 17 (ref 12–20)
CALCIUM SERPL-MCNC: 8.9 MG/DL (ref 8.5–10.1)
CHLORIDE SERPL-SCNC: 102 MMOL/L (ref 100–111)
CO2 SERPL-SCNC: 30 MMOL/L (ref 21–32)
CREAT SERPL-MCNC: 0.83 MG/DL (ref 0.6–1.3)
DIFFERENTIAL METHOD BLD: ABNORMAL
EOSINOPHIL # BLD: 0.7 K/UL (ref 0–0.4)
EOSINOPHIL NFR BLD: 7 % (ref 0–5)
ERYTHROCYTE [DISTWIDTH] IN BLOOD BY AUTOMATED COUNT: 20.7 % (ref 11.6–14.5)
FERRITIN SERPL-MCNC: 34 NG/ML (ref 8–388)
FOLATE SERPL-MCNC: >20 NG/ML (ref 3.1–17.5)
GLOBULIN SER CALC-MCNC: 3 G/DL (ref 2–4)
GLUCOSE SERPL-MCNC: 95 MG/DL (ref 74–99)
HCT VFR BLD AUTO: 41.2 % (ref 36–48)
HGB BLD-MCNC: 12.7 G/DL (ref 13–16)
IMM GRANULOCYTES # BLD AUTO: 0.1 K/UL (ref 0–0.04)
IMM GRANULOCYTES NFR BLD AUTO: 1 % (ref 0–0.5)
IRON SERPL-MCNC: 23 UG/DL (ref 50–175)
LYMPHOCYTES # BLD: 1.8 K/UL (ref 0.9–3.6)
LYMPHOCYTES NFR BLD: 18 % (ref 21–52)
MCH RBC QN AUTO: 25.3 PG (ref 24–34)
MCHC RBC AUTO-ENTMCNC: 30.8 G/DL (ref 31–37)
MCV RBC AUTO: 82.2 FL (ref 78–100)
MONOCYTES # BLD: 1.1 K/UL (ref 0.05–1.2)
MONOCYTES NFR BLD: 11 % (ref 3–10)
NEUTS SEG # BLD: 6.4 K/UL (ref 1.8–8)
NEUTS SEG NFR BLD: 62 % (ref 40–73)
NRBC # BLD: 0 K/UL (ref 0–0.01)
NRBC BLD-RTO: 0 PER 100 WBC
PLATELET # BLD AUTO: 260 K/UL (ref 135–420)
PMV BLD AUTO: 10.5 FL (ref 9.2–11.8)
POTASSIUM SERPL-SCNC: 4.2 MMOL/L (ref 3.5–5.5)
PROT SERPL-MCNC: 6.4 G/DL (ref 6.4–8.2)
RBC # BLD AUTO: 5.01 M/UL (ref 4.35–5.65)
RBC MORPH BLD: ABNORMAL
SODIUM SERPL-SCNC: 138 MMOL/L (ref 136–145)
VIT B12 SERPL-MCNC: 1706 PG/ML (ref 211–911)
WBC # BLD AUTO: 10.2 K/UL (ref 4.6–13.2)

## 2023-02-28 PROCEDURE — 82728 ASSAY OF FERRITIN: CPT

## 2023-02-28 PROCEDURE — 82306 VITAMIN D 25 HYDROXY: CPT

## 2023-02-28 PROCEDURE — 80053 COMPREHEN METABOLIC PANEL: CPT

## 2023-02-28 PROCEDURE — 85025 COMPLETE CBC W/AUTO DIFF WBC: CPT

## 2023-02-28 PROCEDURE — 3074F SYST BP LT 130 MM HG: CPT | Performed by: NURSE PRACTITIONER

## 2023-02-28 PROCEDURE — 99214 OFFICE O/P EST MOD 30 MIN: CPT | Performed by: NURSE PRACTITIONER

## 2023-02-28 PROCEDURE — 83540 ASSAY OF IRON: CPT

## 2023-02-28 PROCEDURE — 84425 ASSAY OF VITAMIN B-1: CPT

## 2023-02-28 PROCEDURE — 82746 ASSAY OF FOLIC ACID SERUM: CPT

## 2023-02-28 PROCEDURE — 36415 COLL VENOUS BLD VENIPUNCTURE: CPT

## 2023-02-28 PROCEDURE — 3078F DIAST BP <80 MM HG: CPT | Performed by: NURSE PRACTITIONER

## 2023-02-28 RX ORDER — TRIAMCINOLONE ACETONIDE 1 MG/G
CREAM TOPICAL
COMMUNITY
Start: 2023-01-17

## 2023-02-28 RX ORDER — UBROGEPANT 100 MG/1
1 TABLET ORAL
COMMUNITY
Start: 2022-06-21

## 2023-02-28 RX ORDER — ATOGEPANT 60 MG/1
TABLET ORAL
COMMUNITY
Start: 2023-02-27

## 2023-02-28 RX ORDER — LINACLOTIDE 145 UG/1
CAPSULE, GELATIN COATED ORAL
COMMUNITY
Start: 2023-02-18

## 2023-02-28 RX ORDER — GABAPENTIN 600 MG/1
TABLET ORAL
COMMUNITY
Start: 2023-02-23

## 2023-02-28 RX ORDER — ACETAMINOPHEN, ASPIRIN AND CAFFEINE 250; 250; 65 MG/1; MG/1; MG/1
1 TABLET, FILM COATED ORAL EVERY 6 HOURS PRN
COMMUNITY
Start: 2022-08-29

## 2023-02-28 RX ORDER — FINASTERIDE 5 MG/1
TABLET, FILM COATED ORAL
COMMUNITY
Start: 2022-04-25 | End: 2023-04-25

## 2023-02-28 RX ORDER — DIAZEPAM 5 MG/1
TABLET ORAL
COMMUNITY
Start: 2023-02-06

## 2023-02-28 RX ORDER — OXCARBAZEPINE 600 MG/1
TABLET, FILM COATED ORAL
COMMUNITY
Start: 2023-02-21

## 2023-02-28 RX ORDER — ATORVASTATIN CALCIUM 40 MG/1
TABLET, FILM COATED ORAL
COMMUNITY
Start: 2023-02-01

## 2023-02-28 RX ORDER — FERROUS SULFATE 325(65) MG
TABLET ORAL
COMMUNITY
Start: 2023-02-17

## 2023-02-28 RX ORDER — OXYCODONE AND ACETAMINOPHEN 7.5; 325 MG/1; MG/1
TABLET ORAL
COMMUNITY
Start: 2023-02-20

## 2023-02-28 RX ORDER — TIZANIDINE 2 MG/1
1 TABLET ORAL 2 TIMES DAILY PRN
COMMUNITY
Start: 2021-10-05

## 2023-02-28 RX ORDER — OMEPRAZOLE 40 MG/1
CAPSULE, DELAYED RELEASE ORAL
COMMUNITY
Start: 2023-02-21

## 2023-02-28 RX ORDER — ERGOCALCIFEROL (VITAMIN D2) 10 MCG
1 TABLET ORAL DAILY
COMMUNITY
Start: 2015-07-22

## 2023-02-28 RX ORDER — BUPROPION HYDROCHLORIDE 300 MG/1
300 TABLET ORAL
COMMUNITY
Start: 2018-04-12

## 2023-02-28 RX ORDER — BUTALBITAL, ACETAMINOPHEN AND CAFFEINE 50; 325; 40 MG/1; MG/1; MG/1
TABLET ORAL
COMMUNITY
Start: 2023-02-03

## 2023-02-28 RX ORDER — TRAZODONE HYDROCHLORIDE 150 MG/1
TABLET ORAL
COMMUNITY
Start: 2023-02-11

## 2023-02-28 NOTE — PROGRESS NOTES
Subjective: Edna Mcpherson  is a 45 y.o. male who presents for follow-up about 4 years following laparoscopic sleeve gastrectomy. He has lost a total of 40 pounds since surgery. Body mass index is 41.32 kg/m². . EBWL is (24%). The patient presents today to assess their progress toward their goal of weight loss and to address any issues that may be present. Today the patient and I have reviewed their diet and how appropriate their food choices are. The following issues have been identified - weight regain of 104 lbs  since lowest point of 175 lbs. Last seen March 2021 when he had lost 79 lbs (46% EBWL) and had had a 19 lbs weight gain at that point. Surgery related complication: NA       He reports reflux and constipation and denies vomiting, abdominal pain, and difficulty swallowing. Using antifungal from PCP for rash under pannus. The patients diet choices have been reviewed today and counseling was given. Fluid intake: 60 -70 oz      Protein intake: 1 shake + food; protein bar, eggs, sausage,       Meals/day: 3-5  Stated at 3 eggs, 3 sausage links and has browns for lunch yesterday    Patients pain score: 0/10    The patient's exercise level: not active, walking with cane    Changes in his medical history and medications have been reviewed.     Comorbidities:    Hypertension:resolved  Diabetes: resolved  Obstructive Sleep Apnea: resolved  Hyperlipidemia: unchanged, on statin  Stress Urinary Incontinence: not applicable  Gastroesophageal Reflux: worsened, on PPI   Weight related arthropathy: worsened, awaiting back surgery    Patient Active Problem List   Diagnosis    Primary localized osteoarthritis of right knee    Bipolar 1 disorder (HCC)    GERD (gastroesophageal reflux disease)    Arthritis of both hands    Alcohol dependence in remission (HCC)    Arthritis of both knees    Migraines    Intestinal malabsorption    Iron deficiency    Intertriginous candidiasis    Cerebral vasculitis    Bipolar affective disorder, mixed, mild (HCC)    CVA (cerebral vascular accident) ([facility] [address]    Hypertension    Hypercholesteremia    Arthritis of spine    Angina at rest [facility])    Arthritis    Tremors of nervous system    S/P laparoscopic sleeve gastrectomy    Hx of cardiomyopathy    Low testosterone    Diastasis recti     No past medical history on file. No past surgical history on file.    Current Outpatient Medications on File Prior to Visit   Medication Sig Dispense Refill    Ubrogepant (UBRELVY) 100 MG TABS Take 1 tablet by mouth once as needed      triamcinolone (KENALOG) 0.1 % cream apply to affected area ONE TO TWO TIMES DAILY IF NEEDED      traZODone (DESYREL) 150 MG tablet take 2 tablets by mouth every evening if needed for sleep      tiZANidine (ZANAFLEX) 2 MG tablet Take 1 tablet by mouth 2 times daily as needed      oxyCODONE-acetaminophen (PERCOCET) 7.5-325 MG per tablet       OXcarbazepine (TRILEPTAL) 600 MG tablet       gabapentin (NEURONTIN) 600 MG tablet take 2 tablets by mouth three times a day      LINZESS 145 MCG capsule take 1 capsule by mouth once daily      Multiple Vitamin (DAILY VALUE MULTIVITAMIN) TABS Take 1 tablet by mouth daily      omeprazole (PRILOSEC) 40 MG delayed release capsule take 1 capsule by mouth twice a day      finasteride (PROSCAR) 5 MG tablet take 1 tablet by mouth once daily      FEROSUL 325 (65 Fe) MG tablet take 1 tablet by mouth twice a day      diclofenac (VOLTAREN) 50 MG EC tablet TAKE 1 TABLET BY MOUTH TWICE DAILY WITH MEALS      diazePAM (VALIUM) 5 MG tablet take 1 tablet by mouth three times a day if needed for anxiety      Cholecalciferol 50 MCG (2000 UT) TABS Take 2,000 Units by mouth three times a week      butalbital-acetaminophen-caffeine (FIORICET, ESGIC) -40 MG per tablet take 1 TO 2 tablets by mouth every 4 hours if needed for headache maximum daily dose of 6      buPROPion (WELLBUTRIN XL) 300 MG extended release tablet Take 300 mg by mouth      atorvastatin (LIPITOR) 40 MG tablet take 1 tablet by mouth once daily      QULIPTA 60 MG TABS       aspirin-acetaminophen-caffeine (EXCEDRIN MIGRAINE) 250-250-65 MG per tablet Take 1 tablet by mouth every 6 hours as needed       No current facility-administered medications on file prior to visit.          Review of Symptoms:       General - No history or complaints of unexpected fever or chills  Head/Neck - No history or complaints of headache or dizziness  Cardiac - No history or complaints of chest pain, palpitations, or shortness of breath  Pulmonary - No history or complaints of shortness of breath or productive cough  Gastrointestinal - as noted above  Genitourinary - No history or complaints of hematuria/dysuria or renal lithiasis  Musculoskeletal - No history or complaints of joint  muscular weakness  Hematologic - No history of any bleeding episodes  Neurologic - No history or complaints of  migraine headaches or neurologic symptoms                     Objective:     /77 (Site: Right Upper Arm, Position: Sitting, Cuff Size: Large Adult)   Pulse 89   Temp 97.5 °F (36.4 °C)   Ht 5' 9\" (1.753 m)   Wt 279 lb 12.8 oz (126.9 kg)   SpO2 95%   BMI 41.32 kg/m²      Physical Exam:      General appearance:  alert, cooperative, no distress, appears stated age   Mental status   alert, oriented to person, place, and time   Neck  supple, no significant adenopathy     Lymphatics  no palpable lymphadenopathy, no hepatosplenomegaly   Chest  clear to auscultation, no wheezes, rales or rhonchi, symmetric air entry   Heart  normal rate, regular rhythm, normal S1, S2, no murmurs, rubs, clicks or gallops    Abdomen: soft, nontender, nondistended, no masses or organomegaly   Incision:  Well healed, no hernias      Neurological  alert, oriented, normal speech, no focal findings or movement disorder noted   Musculoskeletal no joint tenderness, deformity or swelling   Extremities peripheral pulses normal, no pedal edema, no clubbing or cyanosis   Skin normal coloration and turgor, no rashes, no suspicious skin lesions noted          CMP:   Lab Results   Component Value Date/Time     12/03/2019 03:20 AM    K 4.0 12/03/2019 03:20 AM     12/03/2019 03:20 AM    CO2 26 12/03/2019 03:20 AM    BUN 13 12/03/2019 03:20 AM    CREATININE 0.78 12/03/2019 03:20 AM    GLUCOSE 85 12/03/2019 03:20 AM    CALCIUM 7.8 12/03/2019 03:20 AM    PROT 5.6 11/05/2019 11:55 AM    LABALBU 3.2 11/05/2019 11:55 AM    BILITOT 0.3 11/05/2019 11:55 AM    AST 20 11/05/2019 11:55 AM    ALT 45 11/05/2019 11:55 AM      CBC:   Lab Results   Component Value Date/Time    WBC 11.3 12/03/2019 03:20 AM    RBC 3.95 12/03/2019 03:20 AM    HGB 10.5 12/03/2019 03:20 AM    HCT 33.3 12/03/2019 03:20 AM    MCV 84.3 12/03/2019 03:20 AM    MCH 26.6 12/03/2019 03:20 AM    MCHC 31.5 12/03/2019 03:20 AM    RDW 14.5 12/03/2019 03:20 AM     12/03/2019 03:20 AM    MPV 10.5 12/03/2019 03:20 AM      VITAMIN D:   Lab Results   Component Value Date/Time    VITD25 67.1 02/12/2020 11:21 AM     VITAMIN B12 AND FOLATE:   Lab Results   Component Value Date/Time    ZVIEPXRU51 >2000 02/12/2020 11:21 AM    FOLATE >20.0 02/12/2020 11:21 AM     IRON:   Lab Results   Component Value Date/Time    IRON 17 02/12/2020 11:21 AM     FERRITIN:   Lab Results   Component Value Date/Time    FERRITIN 8 02/12/2020 11:21 AM     VITAMIN B1:   Lab Results   Component Value Date/Time    BXPW5FSFYBO 195.4 02/12/2020 11:21 AM               Assessment and Plan:   Intestinal malabsorption  continue required Vitamins: B12, B complex, D, iron, calcium, multivitamin  S/p laparoscopic bariatric surgery, sleeve gastrectomy, history of morbid obesity. Weight regain from increased carbs. Recommend meeting with dietitian for back on track teaching. We reviewed general guidelines. Aim for 100g protein daily and decrease carbs below 50g. Sleep goal is 7-9 hours each night.  Patient education given on the effects of sleep deprivation on weight control.  Discussed patients weight loss goals and dietary choices in relation to goals.  Reminded to measure portions, continue high protein, low carbohydrate diet.  Reminded to eat regularly, to eat slowly & not to drink with meals.    Continue cardio exercise and add resistance exercises. 60-90 minutes of aerobic activity 5 days a week and strength training 2 days each week.  Encouraged to attend support group   Required fluid intake is >64oz daily of decaffeinated sugar free beverages.   3. Reflux - worsened from weight gain, continue PPI, can increase to BID  4. Intertriginous dermatitis - continue antifungal, avoid moisture  5. Low iron - on MVI with iron, last level 17, repeat iron and ferritin with CBC today  Labs ordered today  Follow up in 3 months or sooner if patient has questions, concerns or worsening of condition, if unable to reach our office, patient should report to the ED.  Mr. Marrero has a reminder for a \"due or due soon\" health maintenance. I have asked that he contact his primary care provider for a follow-up on this health maintenance.     Total time spent with the patient 30 minutes.

## 2023-02-28 NOTE — PATIENT INSTRUCTIONS
Do 3 days of the 2 week postop diet    Then go to section 1-6 months postop diet    Aim for 100g protein a day      Patient Instructions      Remember hydration goals - minimum of 64 ounces of liquids per day (dehydration is the number one reason for hospital readmission). Continue to monitor carbohydrate and protein intake you need a minimum of  Grams of protein daily- remember to keep your total carbohydrates to 50 grams or less per day for best results. Continue to work towards exercise goals - 60-90 minutes, 5 times a week minimum of deliberate, aerobic exercise is the ultimate goal with strength training 2 times each week. Refer to [employer] for  information. Remember to take vitamins as directed. Attend support group the 2nd Thursday of each month. 6.  Constipation: Milk of Magnesia is for immediate relief only. Miralax is to be used every day if constipation is a chronic problem. 7.  Diarrhea: patients will occasionally develop lactose intolerance after surgery. Check to see if your protein shake has whey in it. If it does try a protein powder or drink that does not have whey and stop all yogurts, cheeses and milks to see if the diarrhea goes away. 8.  If you have had labs drawn. We will only call you if you have abnormal results. Otherwise you can access the lab results in \"mychart\". You will only need the access code the first time you sign on. 9.  Call us at [phone] or email us through [facility]" with questions,     concerns or worsening of condition, we have someone on call 24 hours a day. If you are unable to reach our office, you are to go to your Primary Care Physician or the Emergency Department.      NOTE TO GASTRIC BYPASS PATIENTS:  (SAME APPLIES TO GASTRIC SLEEVE PATIENTS FOR FIRST TWO MONTHS)  Remember that for the rest of your life, you are not able to take the following:  - NSAIDs (ibuprofen, goody powder, BC powder, Motrin, Advil, Mobic, Voltaren, Excedrin, etc.)  - Steroid pills or injections  - Smoke (cigarettes or recreational drugs)  - Alcohol  Use of any of the above may cause ulcers in your stomach which may perforate causing a medical emergency and surgery. Speak to our medical staff if another medical provider requires you to take steroids or NSAIDs. Supplement Resource Guide    Importance of Protein:   Maintains lean body mass, produces antibodies to fight off infections, heals wounds, minimizes hair loss, helps to give you energy, helps with satiety, and keeping you full between meals. Importance of Calcium:  Needed for healthy bones and teeth, normal blood clotting, and nervous system functioning, higher risk of osteoporosis and bone disease with non-compliance. Importance of Multivitamins: Many functions. Supply you with extra nutrients that you may be missing from food. May lead to iron deficiency anemia, weakness, fatigue, and many other symptoms with non-compliance. Importance of B Vitamins:  Important for red blood cell formation, metabolism, energy, and helps to maintain a healthy nervous system. Protein Supplement  Find one you like now. Use immediately after surgery. Look for:  35-50g protein each day from your protein supplement once you reach the progression diet. 0-3 g fat per serving  0-3 g sugar per serving    Protein drinks should be split in separate dosages. Recommend: Lifelong  1 year + Calcium Supplement:     Start taking within a month after surgery. Look for: Calcium Citrate Plus D (1500 mg per day)  Recommend: Citracal     .            Avoid chocolate chewable calcium. Can use chewable bariatric or [employer] brand or similar chewable. The body cannot absorb more than 500-600 mg of calcium at a time. Take for Life Multi-vitamin Supplement:      Start immediately after surgery: any complete chewable, such as: [place]s Complete chewables. Avoid [place] sours or gummies. They lack iron and other important nutrients and also have added sugar. Continue with chewable vitamin or change to adult complete multivitamin one month after surgery. Menstruating women can take a prenatal vitamin. Make sure has at least 18 mg iron and 531-287 mcg folic acid   Vitamin I90, B Complex Vitamin, and Biotin  Start taking within a month after surgery. Vitamin B12:  1000 mcg of Vitamin B12 three times weekly    Must take sublingually (meaning you take it under your tongue) or in a liquid drop form for easy absorption. B Complex Vitamin: Take a pill or liquid drop form once daily. Biotin: This vitamin can help prevent hair loss.     Recommend 5mg   (5000 mcg) a day  Biotin is Optional

## 2023-03-02 LAB — VIT B1 BLD-SCNC: 247.7 NMOL/L (ref 66.5–200)

## 2023-03-22 DIAGNOSIS — F31.61 BIPOLAR AFFECTIVE DISORDER, MIXED, MILD (HCC): ICD-10-CM

## 2023-03-22 RX ORDER — QUETIAPINE FUMARATE 300 MG/1
300 TABLET, FILM COATED ORAL
Qty: 30 TABLET | Refills: 5 | Status: SHIPPED | OUTPATIENT
Start: 2023-03-22

## 2023-03-22 RX ORDER — DULOXETIN HYDROCHLORIDE 60 MG/1
60 CAPSULE, DELAYED RELEASE ORAL DAILY
Qty: 30 CAPSULE | Refills: 5 | Status: SHIPPED | OUTPATIENT
Start: 2023-03-22

## 2023-03-22 RX ORDER — GABAPENTIN 600 MG/1
1200 TABLET ORAL 3 TIMES DAILY
Qty: 180 TABLET | Refills: 5 | Status: SHIPPED | OUTPATIENT
Start: 2023-03-22

## 2023-04-25 ENCOUNTER — HOSPITAL ENCOUNTER (OUTPATIENT)
Dept: BEHAVIORAL/MENTAL HEALTH | Age: 53
Discharge: HOME | End: 2023-04-25
Payer: COMMERCIAL

## 2023-04-25 PROCEDURE — 99214 OFFICE O/P EST MOD 30 MIN: CPT | Performed by: PSYCHIATRY & NEUROLOGY

## 2023-04-25 NOTE — PROGRESS NOTES
INTERVAL HISTORY (In Person):  Mr. 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 He'd been much more stable for some time and we began to decrease the meds by dropping the Trileptal, and decreasing the Risperidone from 4 mg to 2 mg secondary to a gastric sleeve procedure done in early Feb 2019. We've since restarted the Trileptal (initially just 300 mg daily) due to some agitation and dysphoria, and more recently increased the Trazodone to 300 mg due to worsened insomnia. Since then, we've increased the Trileptal to 300 mg bid and then to a tid schedule, and changed the Clonazepam (rare use) to Diazepam at the 5 mg dose. Eleven months ago, I increased the GBP and the Diazepam at the request of his Pain MD, and he was feeling \"pretty good\" the next time. However, over the past 7-8 months he's been much more lucas and irritable, much of it fueled by his chronic pain. He comes in today and states that he's still been dealing with some mood instability and irritability, but it's not been overtly bad. He's still dealing with a lot of pain and that's undoubtedly causing much (if not all) of the agitation and moodiness. He's not been overly dysphoric, and certainly hasn't been experiencing any manic sx's, objectively. His biggest stressor (besides the pain) has been some conflict with his wife, who may not truly appreciate his physical limitations at this point. Discussed how the stress of their situation has strained the relationship, but that it's understandable and will likely be temporary once he feels better and they establish a new/different dynamic between them.  He's no longer lethargic or ataxic as he was at other times, and he's tolerating the meds well without any overt SE's noted, including no cognitive slowing. CURRENT MEDICATIONS:  1. Risperidone 2 mg qhs  2. Seroquel 300 mg qhs  3. Cymbalta 60 mg daily  4. Trazodone 300 mg qhs prn for sleep  5. Trileptal 600 mg bid  6. Gabapentin 1200 mg tid  7. Diazepam 5 mg tid prn--taking 2/day most days  8. Using weekly Buprenorphine patch 7.5 mcg/hr and Percocet 7.5/325--1-2/day       MENTAL STATUS EXAM:  Mr. Mario Amin was noted to be a casually dressed, adequately groomed 53-year-old who appeared his stated age. He was very pleasant and cooperative, and exhibited a full affective range again this time. He hasn't had any significant depressive sx's or any overt zoe, but he has had some instability and irritability. He denied feeling hopeless or having any suicidal thoughts at all. There was also no evidence of any anxiety, or any psychosis such as hallucinations or delusions. His judgment and insight appeared to be fairly good. His cognitive functioning was relatively stable and unchanged, with fewer deficits noted in the areas of concentration and attention span. DIAGNOSTIC IMPRESSION:  Axis I     1. Bipolar Disorder, mixed, mild (F31.61). 2.  Alcohol Use Disorder in remission for many years (F10.21). Axis II     Deferred. Axis III   Occipital CVA d/t vasculitis; severe DJD with chronic pain; HTN; obesity; HLD; migraines; and GERD. PLAN:  1. Continue the Risperidone at 2 mg qhs--has 5.5 months of refills (30 day)  2. Continue the Trileptal at 600 mg bid--has 5 months of refills (30 day)    3. Continue the Gabapentin at 1200 mg tid--has 5 months of refills (30 day)   4. Continue the Trazodone at 300 mg qhs prn--has 5 months of refills (30 day)  5. Continue the Diazepam at 5 mg tid prn--has 5.5 months of refills (30 day)  6. Continue the Seroquel at 300 mg qhs--has 5 months of refills (30 day)  7. Continue the Cymbalta at 60 mg daily--has 5 months of refills (30 day)  8.   Follow up with me in 3 months, sooner if needed.

## 2023-05-07 RX ORDER — NYSTATIN 100000 U/G
CREAM TOPICAL 2 TIMES DAILY
COMMUNITY
Start: 2021-03-23

## 2023-05-07 RX ORDER — NITROGLYCERIN 0.4 MG/1
TABLET SUBLINGUAL
COMMUNITY
Start: 2018-04-25

## 2023-05-23 ENCOUNTER — TELEPHONE (OUTPATIENT)
Facility: HOSPITAL | Age: 53
End: 2023-05-23

## 2023-05-23 RX ORDER — DULOXETIN HYDROCHLORIDE 30 MG/1
30 CAPSULE, DELAYED RELEASE ORAL DAILY
Qty: 30 CAPSULE | Refills: 3 | Status: SHIPPED | OUTPATIENT
Start: 2023-05-23

## 2023-05-23 NOTE — TELEPHONE ENCOUNTER
Feeling more depressed, fairly steadily. Will increase the Cymbalta to 90 mg daily taking a 30 mg with the 60 mg caps he already has.

## 2023-06-05 RX ORDER — BUPROPION HYDROCHLORIDE 300 MG/1
300 TABLET ORAL EVERY MORNING
Qty: 30 TABLET | Refills: 11 | Status: SHIPPED | OUTPATIENT
Start: 2023-06-05

## 2023-08-01 ENCOUNTER — HOSPITAL ENCOUNTER (OUTPATIENT)
Facility: HOSPITAL | Age: 53
Discharge: HOME | End: 2023-08-04
Payer: COMMERCIAL

## 2023-08-01 PROCEDURE — 99443 PR PHYS/QHP TELEPHONE EVALUATION 21-30 MIN: CPT | Performed by: PSYCHIATRY & NEUROLOGY

## 2023-08-01 NOTE — PROGRESS NOTES
Consent: The patient and/or their healthcare decision maker is aware that they may receive a bill (including co-pays) for this audio only encounter, depending on their insurance coverage, and has provided verbal consent to proceed. The patient was located in [place], where I am licensed to provide care. INTERVAL HISTORY (Audio Only):  Mr. Tariq Chan is a 57-year-old  white male following up with me over 3 months since his last appointment concerning a longstanding history of Bipolar Disorder for which he developed some residual mixed and depressive symptoms since suffering an occipital lobe CVA on 08/27/16, secondary to vasculitis. He'd been much more stable for some time and we began to decrease the meds by dropping the Trileptal, and decreasing the Risperidone from 4 mg to 2 mg secondary to a gastric sleeve procedure done in early Feb 2019. We've since restarted the Trileptal (initially just 300 mg daily) due to some agitation and dysphoria, and more recently increased the Trazodone to 300 mg due to worsened insomnia. Since then, we've increased the Trileptal to 300 mg tid and changed the Clonazepam (rare use) to Diazepam at the 5 mg dose. Fourteen months ago, I increased the GBP and the Diazepam at the request of his Pain MD, and he was feeling \"pretty good\" the next time. However, over the past 10-11 months he's been much more buchanan and irritable, much of it fueled by his chronic pain. Two months ago I increased the Cymbalta to 90 mg due to some worsening dysphoria. He states that he's been feeling mild to moderately better lately, some of it due to having lost more weight (down to 260#), and also the increased dose Cymbalta (90 mg). He's still dealing with a lot of pain, but he's encouraged he'll be able to get the back surgery once he loses another 10#. He appears to be brighter and less distressed lately, although he still has a number of concerns and worries about his health and other things.  His

## 2023-09-20 RX ORDER — DULOXETIN HYDROCHLORIDE 30 MG/1
30 CAPSULE, DELAYED RELEASE ORAL DAILY
Qty: 30 CAPSULE | Refills: 5 | Status: SHIPPED | OUTPATIENT
Start: 2023-09-20

## 2023-09-20 RX ORDER — DULOXETIN HYDROCHLORIDE 60 MG/1
60 CAPSULE, DELAYED RELEASE ORAL DAILY
Qty: 30 CAPSULE | Refills: 5 | Status: SHIPPED | OUTPATIENT
Start: 2023-09-20

## 2023-10-04 DIAGNOSIS — F31.61 BIPOLAR AFFECTIVE DISORDER, MIXED, MILD (HCC): ICD-10-CM

## 2023-10-04 DIAGNOSIS — F10.21 ALCOHOL DEPENDENCE IN REMISSION (HCC): Primary | ICD-10-CM

## 2023-10-04 RX ORDER — TRAZODONE HYDROCHLORIDE 150 MG/1
150 TABLET ORAL NIGHTLY
Qty: 30 TABLET | Refills: 5 | Status: SHIPPED | OUTPATIENT
Start: 2023-10-04

## 2023-10-04 RX ORDER — QUETIAPINE FUMARATE 300 MG/1
300 TABLET, FILM COATED ORAL
Qty: 30 TABLET | Refills: 5 | Status: SHIPPED | OUTPATIENT
Start: 2023-10-04

## 2023-10-04 RX ORDER — RISPERIDONE 2 MG/1
2 TABLET ORAL
Qty: 30 TABLET | Refills: 5 | Status: SHIPPED | OUTPATIENT
Start: 2023-10-04

## 2023-10-04 RX ORDER — OXCARBAZEPINE 600 MG/1
600 TABLET, FILM COATED ORAL 2 TIMES DAILY
Qty: 60 TABLET | Refills: 5 | Status: SHIPPED | OUTPATIENT
Start: 2023-10-04

## 2023-10-04 RX ORDER — GABAPENTIN 600 MG/1
1200 TABLET ORAL 3 TIMES DAILY
Qty: 180 TABLET | Refills: 5 | Status: SHIPPED | OUTPATIENT
Start: 2023-10-04 | End: 2024-04-01

## 2023-11-13 DIAGNOSIS — F10.21 ALCOHOL DEPENDENCE IN REMISSION (HCC): ICD-10-CM

## 2023-11-13 DIAGNOSIS — F31.61 BIPOLAR AFFECTIVE DISORDER, MIXED, MILD (HCC): Primary | ICD-10-CM

## 2023-11-13 RX ORDER — DIAZEPAM 5 MG/1
5 TABLET ORAL 3 TIMES DAILY PRN
Qty: 90 TABLET | Refills: 5 | Status: SHIPPED | OUTPATIENT
Start: 2023-11-13 | End: 2024-05-11

## 2023-12-04 ENCOUNTER — HOSPITAL ENCOUNTER (OUTPATIENT)
Facility: HOSPITAL | Age: 53
Discharge: HOME | End: 2023-12-07
Payer: COMMERCIAL

## 2023-12-04 PROCEDURE — 99443 PR PHYS/QHP TELEPHONE EVALUATION 21-30 MIN: CPT | Performed by: PSYCHIATRY & NEUROLOGY

## 2023-12-04 NOTE — PROGRESS NOTES
CVA d/t vasculitis; severe DJD with chronic pain; HTN; obesity; HLD; migraines; and GERD. PLAN:  1. Continue the Risperidone at 2 mg qhs--has 4 months of refills (30 day)  2. Continue the Trileptal at 600 mg bid--has 4 months of refills (30 day)    3. Continue the Gabapentin at 1200 mg tid--has 4 months of refills (30 day)   4. Continue the Trazodone at 300 mg qhs prn--has 4 months of refills (30 day)  5. Continue the Diazepam at 5 mg tid prn--has 5+ months of refills (30 day)  6. Continue the Seroquel at 300 mg qhs--has 4 months of refills (30 day)  7. Continue the Cymbalta at 90 mg daily (60 and 30)--has 3.5 months of refills of each (30 day)  8. Continue the Wellbutrin XL at 300 mg daily--has 6 months of refills (30 day)  9. Seeing a therapist monthly. 10. Follow up with me in 4  months, sooner if needed. I affirm this is a Patient-Initiated-Episode with a patient who has not had a related appointment within my department in the past 7 days or scheduled within the next 24 hours. Total Time: 24 minutes  Note: not billable if the call serves to triage the patient into an appointment for the relevant concern. Patient was evaluated by phone call and had no access to a computer or smart phone. Chart reviewed. Evaluated and management per the note above. POS: patient at home; provider in office.

## 2023-12-13 ENCOUNTER — HOSPITAL ENCOUNTER (OUTPATIENT)
Facility: HOSPITAL | Age: 53
Discharge: HOME | End: 2023-12-16

## 2023-12-13 VITALS — HEIGHT: 69 IN | BODY MASS INDEX: 40.49 KG/M2 | WEIGHT: 273.4 LBS

## 2024-01-03 ENCOUNTER — TRANSCRIBE ORDERS (OUTPATIENT)
Facility: HOSPITAL | Age: 54
End: 2024-01-03

## 2024-01-03 ENCOUNTER — HOSPITAL ENCOUNTER (OUTPATIENT)
Facility: HOSPITAL | Age: 54
Discharge: HOME | End: 2024-01-06
Payer: COMMERCIAL

## 2024-01-03 DIAGNOSIS — M17.12 OSTEOARTHRITIS OF LEFT KNEE, UNSPECIFIED OSTEOARTHRITIS TYPE: ICD-10-CM

## 2024-01-03 DIAGNOSIS — M17.12 OSTEOARTHRITIS OF LEFT KNEE, UNSPECIFIED OSTEOARTHRITIS TYPE: Primary | ICD-10-CM

## 2024-01-03 LAB
APPEARANCE UR: CLEAR
BILIRUB UR QL: NEGATIVE
COLOR UR: ABNORMAL
ERYTHROCYTE [SEDIMENTATION RATE] IN BLOOD: 3 MM/HR (ref 0–20)
EST. AVERAGE GLUCOSE BLD GHB EST-MCNC: 103 MG/DL
GLUCOSE UR STRIP.AUTO-MCNC: NEGATIVE MG/DL
HBA1C MFR BLD: 5.2 % (ref 4.2–5.6)
HGB UR QL STRIP: NEGATIVE
KETONES UR QL STRIP.AUTO: ABNORMAL MG/DL
LEUKOCYTE ESTERASE UR QL STRIP.AUTO: NEGATIVE
NITRITE UR QL STRIP.AUTO: NEGATIVE
PH UR STRIP: 5.5 (ref 5–8)
PROT UR STRIP-MCNC: NEGATIVE MG/DL
SP GR UR REFRACTOMETRY: 1.03 (ref 1–1.03)
UROBILINOGEN UR QL STRIP.AUTO: 0.2 EU/DL (ref 0.2–1)

## 2024-01-03 PROCEDURE — 83036 HEMOGLOBIN GLYCOSYLATED A1C: CPT

## 2024-01-03 PROCEDURE — 81003 URINALYSIS AUTO W/O SCOPE: CPT

## 2024-01-03 PROCEDURE — 85652 RBC SED RATE AUTOMATED: CPT

## 2024-01-03 PROCEDURE — 36415 COLL VENOUS BLD VENIPUNCTURE: CPT

## 2024-01-05 LAB
BACTERIA SPEC CULT: NORMAL
BACTERIA SPEC CULT: NORMAL
SERVICE CMNT-IMP: NORMAL

## 2024-01-26 ENCOUNTER — ANESTHESIA EVENT (OUTPATIENT)
Facility: HOSPITAL | Age: 54
End: 2024-01-26
Payer: COMMERCIAL

## 2024-01-29 PROBLEM — M17.12 OSTEOARTHRITIS OF LEFT KNEE: Chronic | Status: ACTIVE | Noted: 2024-01-29

## 2024-01-29 RX ORDER — DIPHENHYDRAMINE HYDROCHLORIDE 50 MG/ML
25 INJECTION INTRAMUSCULAR; INTRAVENOUS EVERY 6 HOURS PRN
Status: CANCELLED | OUTPATIENT
Start: 2024-01-29

## 2024-01-29 RX ORDER — DIPHENHYDRAMINE HCL 25 MG
25 CAPSULE ORAL EVERY 6 HOURS PRN
Status: CANCELLED | OUTPATIENT
Start: 2024-01-29

## 2024-01-29 RX ORDER — SODIUM CHLORIDE 9 MG/ML
INJECTION, SOLUTION INTRAVENOUS CONTINUOUS
Status: CANCELLED | OUTPATIENT
Start: 2024-01-29 | End: 2024-01-29

## 2024-01-29 RX ORDER — ACETAMINOPHEN 325 MG/1
650 TABLET ORAL EVERY 6 HOURS
Status: CANCELLED | OUTPATIENT
Start: 2024-01-29

## 2024-01-29 RX ORDER — SODIUM CHLORIDE 0.9 % (FLUSH) 0.9 %
5-40 SYRINGE (ML) INJECTION PRN
Status: CANCELLED | OUTPATIENT
Start: 2024-01-29

## 2024-01-29 RX ORDER — SODIUM CHLORIDE 9 MG/ML
INJECTION, SOLUTION INTRAVENOUS CONTINUOUS
Status: CANCELLED | OUTPATIENT
Start: 2024-01-29

## 2024-01-30 ENCOUNTER — APPOINTMENT (OUTPATIENT)
Facility: HOSPITAL | Age: 54
End: 2024-01-30
Attending: ORTHOPAEDIC SURGERY
Payer: COMMERCIAL

## 2024-01-30 ENCOUNTER — ANESTHESIA (OUTPATIENT)
Facility: HOSPITAL | Age: 54
End: 2024-01-30
Payer: COMMERCIAL

## 2024-01-30 ENCOUNTER — HOSPITAL ENCOUNTER (OUTPATIENT)
Facility: HOSPITAL | Age: 54
Setting detail: OUTPATIENT SURGERY
Discharge: HOME | End: 2024-01-30
Attending: ORTHOPAEDIC SURGERY | Admitting: ORTHOPAEDIC SURGERY
Payer: COMMERCIAL

## 2024-01-30 VITALS
TEMPERATURE: 97.8 F | HEART RATE: 71 BPM | BODY MASS INDEX: 39.41 KG/M2 | DIASTOLIC BLOOD PRESSURE: 68 MMHG | OXYGEN SATURATION: 99 % | RESPIRATION RATE: 16 BRPM | SYSTOLIC BLOOD PRESSURE: 112 MMHG | HEIGHT: 69 IN | WEIGHT: 266.1 LBS

## 2024-01-30 DIAGNOSIS — M17.12 PRIMARY OSTEOARTHRITIS OF LEFT KNEE: Primary | Chronic | ICD-10-CM

## 2024-01-30 LAB
ABO + RH BLD: NORMAL
BLOOD GROUP ANTIBODIES SERPL: NORMAL
SPECIMEN EXP DATE BLD: NORMAL

## 2024-01-30 PROCEDURE — 2580000003 HC RX 258: Performed by: ORTHOPAEDIC SURGERY

## 2024-01-30 PROCEDURE — 2500000003 HC RX 250 WO HCPCS: Performed by: ORTHOPAEDIC SURGERY

## 2024-01-30 PROCEDURE — 86900 BLOOD TYPING SEROLOGIC ABO: CPT

## 2024-01-30 PROCEDURE — 6360000002 HC RX W HCPCS: Performed by: ANESTHESIOLOGY

## 2024-01-30 PROCEDURE — 2709999900 HC NON-CHARGEABLE SUPPLY: Performed by: ORTHOPAEDIC SURGERY

## 2024-01-30 PROCEDURE — 7100000010 HC PHASE II RECOVERY - FIRST 15 MIN: Performed by: ORTHOPAEDIC SURGERY

## 2024-01-30 PROCEDURE — 86850 RBC ANTIBODY SCREEN: CPT

## 2024-01-30 PROCEDURE — 6360000002 HC RX W HCPCS: Performed by: ORTHOPAEDIC SURGERY

## 2024-01-30 PROCEDURE — 7100000001 HC PACU RECOVERY - ADDTL 15 MIN: Performed by: ORTHOPAEDIC SURGERY

## 2024-01-30 PROCEDURE — 6360000002 HC RX W HCPCS: Performed by: PHYSICIAN ASSISTANT

## 2024-01-30 PROCEDURE — 6370000000 HC RX 637 (ALT 250 FOR IP): Performed by: PHYSICIAN ASSISTANT

## 2024-01-30 PROCEDURE — 3700000000 HC ANESTHESIA ATTENDED CARE: Performed by: ORTHOPAEDIC SURGERY

## 2024-01-30 PROCEDURE — C1776 JOINT DEVICE (IMPLANTABLE): HCPCS | Performed by: ORTHOPAEDIC SURGERY

## 2024-01-30 PROCEDURE — 3600000005 HC SURGERY LEVEL 5 BASE: Performed by: ORTHOPAEDIC SURGERY

## 2024-01-30 PROCEDURE — 97116 GAIT TRAINING THERAPY: CPT

## 2024-01-30 PROCEDURE — 64447 NJX AA&/STRD FEMORAL NRV IMG: CPT | Performed by: ANESTHESIOLOGY

## 2024-01-30 PROCEDURE — 73560 X-RAY EXAM OF KNEE 1 OR 2: CPT

## 2024-01-30 PROCEDURE — 86901 BLOOD TYPING SEROLOGIC RH(D): CPT

## 2024-01-30 PROCEDURE — 7100000011 HC PHASE II RECOVERY - ADDTL 15 MIN: Performed by: ORTHOPAEDIC SURGERY

## 2024-01-30 PROCEDURE — 3700000001 HC ADD 15 MINUTES (ANESTHESIA): Performed by: ORTHOPAEDIC SURGERY

## 2024-01-30 PROCEDURE — A4217 STERILE WATER/SALINE, 500 ML: HCPCS | Performed by: ORTHOPAEDIC SURGERY

## 2024-01-30 PROCEDURE — C1713 ANCHOR/SCREW BN/BN,TIS/BN: HCPCS | Performed by: ORTHOPAEDIC SURGERY

## 2024-01-30 PROCEDURE — 7100000000 HC PACU RECOVERY - FIRST 15 MIN: Performed by: ORTHOPAEDIC SURGERY

## 2024-01-30 PROCEDURE — 2500000003 HC RX 250 WO HCPCS: Performed by: NURSE ANESTHETIST, CERTIFIED REGISTERED

## 2024-01-30 PROCEDURE — 6360000002 HC RX W HCPCS: Performed by: NURSE ANESTHETIST, CERTIFIED REGISTERED

## 2024-01-30 PROCEDURE — 97161 PT EVAL LOW COMPLEX 20 MIN: CPT

## 2024-01-30 PROCEDURE — 36415 COLL VENOUS BLD VENIPUNCTURE: CPT

## 2024-01-30 PROCEDURE — 3600000015 HC SURGERY LEVEL 5 ADDTL 15MIN: Performed by: ORTHOPAEDIC SURGERY

## 2024-01-30 PROCEDURE — 2500000003 HC RX 250 WO HCPCS: Performed by: PHYSICIAN ASSISTANT

## 2024-01-30 DEVICE — TIBIAL BEARING INSERT - CS
Type: IMPLANTABLE DEVICE | Site: KNEE | Status: FUNCTIONAL
Brand: TRIATHLON

## 2024-01-30 DEVICE — TIBIAL COMPONENT
Type: IMPLANTABLE DEVICE | Site: KNEE | Status: FUNCTIONAL
Brand: TRIATHLON

## 2024-01-30 DEVICE — PATELLA
Type: IMPLANTABLE DEVICE | Site: KNEE | Status: FUNCTIONAL
Brand: TRIATHLON

## 2024-01-30 DEVICE — CRUCIATE RETAINING FEMORAL
Type: IMPLANTABLE DEVICE | Site: KNEE | Status: FUNCTIONAL
Brand: TRIATHLON

## 2024-01-30 RX ORDER — ACETAMINOPHEN 500 MG
1000 TABLET ORAL ONCE
Status: COMPLETED | OUTPATIENT
Start: 2024-01-30 | End: 2024-01-30

## 2024-01-30 RX ORDER — SODIUM CHLORIDE, SODIUM LACTATE, POTASSIUM CHLORIDE, AND CALCIUM CHLORIDE .6; .31; .03; .02 G/100ML; G/100ML; G/100ML; G/100ML
1000 INJECTION, SOLUTION INTRAVENOUS ONCE
Status: DISCONTINUED | OUTPATIENT
Start: 2024-01-30 | End: 2024-01-30 | Stop reason: HOSPADM

## 2024-01-30 RX ORDER — OXYCODONE HYDROCHLORIDE 5 MG/1
5 TABLET ORAL EVERY 4 HOURS PRN
Status: DISCONTINUED | OUTPATIENT
Start: 2024-01-30 | End: 2024-01-30 | Stop reason: HOSPADM

## 2024-01-30 RX ORDER — PROPOFOL 10 MG/ML
INJECTION, EMULSION INTRAVENOUS PRN
Status: DISCONTINUED | OUTPATIENT
Start: 2024-01-30 | End: 2024-01-30 | Stop reason: SDUPTHER

## 2024-01-30 RX ORDER — CHLORHEXIDINE GLUCONATE 4 G/100ML
SOLUTION TOPICAL ONCE
Status: DISCONTINUED | OUTPATIENT
Start: 2024-01-30 | End: 2024-01-30 | Stop reason: HOSPADM

## 2024-01-30 RX ORDER — DIPHENHYDRAMINE HYDROCHLORIDE 50 MG/ML
12.5 INJECTION INTRAMUSCULAR; INTRAVENOUS
Status: DISCONTINUED | OUTPATIENT
Start: 2024-01-30 | End: 2024-01-30 | Stop reason: HOSPADM

## 2024-01-30 RX ORDER — MIDAZOLAM HYDROCHLORIDE 1 MG/ML
INJECTION INTRAMUSCULAR; INTRAVENOUS
Status: COMPLETED
Start: 2024-01-30 | End: 2024-01-30

## 2024-01-30 RX ORDER — TRANEXAMIC ACID 10 MG/ML
1000 INJECTION, SOLUTION INTRAVENOUS ONCE
Status: COMPLETED | OUTPATIENT
Start: 2024-01-30 | End: 2024-01-30

## 2024-01-30 RX ORDER — DEXAMETHASONE SODIUM PHOSPHATE 4 MG/ML
8 INJECTION, SOLUTION INTRA-ARTICULAR; INTRALESIONAL; INTRAMUSCULAR; INTRAVENOUS; SOFT TISSUE ONCE
Status: COMPLETED | OUTPATIENT
Start: 2024-01-30 | End: 2024-01-30

## 2024-01-30 RX ORDER — DROPERIDOL 2.5 MG/ML
0.62 INJECTION, SOLUTION INTRAMUSCULAR; INTRAVENOUS
Status: COMPLETED | OUTPATIENT
Start: 2024-01-30 | End: 2024-01-30

## 2024-01-30 RX ORDER — LIDOCAINE HYDROCHLORIDE 20 MG/ML
INJECTION, SOLUTION EPIDURAL; INFILTRATION; INTRACAUDAL; PERINEURAL PRN
Status: DISCONTINUED | OUTPATIENT
Start: 2024-01-30 | End: 2024-01-30 | Stop reason: SDUPTHER

## 2024-01-30 RX ORDER — FENTANYL CITRATE 50 UG/ML
25 INJECTION, SOLUTION INTRAMUSCULAR; INTRAVENOUS EVERY 5 MIN PRN
Status: DISCONTINUED | OUTPATIENT
Start: 2024-01-30 | End: 2024-01-30 | Stop reason: HOSPADM

## 2024-01-30 RX ORDER — NALOXONE HYDROCHLORIDE 2 MG/.4ML
2 INJECTION, SOLUTION INTRAMUSCULAR; SUBCUTANEOUS
Qty: 0.4 ML | Refills: 0 | Status: SHIPPED | OUTPATIENT
Start: 2024-01-30 | End: 2024-01-30

## 2024-01-30 RX ORDER — MEPERIDINE HYDROCHLORIDE 50 MG/ML
12.5 INJECTION INTRAMUSCULAR; INTRAVENOUS; SUBCUTANEOUS ONCE
Status: DISCONTINUED | OUTPATIENT
Start: 2024-01-30 | End: 2024-01-30 | Stop reason: HOSPADM

## 2024-01-30 RX ORDER — CEFADROXIL 500 MG/1
500 CAPSULE ORAL 2 TIMES DAILY
Qty: 10 CAPSULE | Refills: 0 | Status: SHIPPED | OUTPATIENT
Start: 2024-01-30 | End: 2024-02-04

## 2024-01-30 RX ORDER — ONDANSETRON 2 MG/ML
INJECTION INTRAMUSCULAR; INTRAVENOUS PRN
Status: DISCONTINUED | OUTPATIENT
Start: 2024-01-30 | End: 2024-01-30 | Stop reason: SDUPTHER

## 2024-01-30 RX ORDER — MIDAZOLAM HYDROCHLORIDE 1 MG/ML
INJECTION INTRAMUSCULAR; INTRAVENOUS PRN
Status: DISCONTINUED | OUTPATIENT
Start: 2024-01-30 | End: 2024-01-30 | Stop reason: SDUPTHER

## 2024-01-30 RX ORDER — OXYCODONE HYDROCHLORIDE 5 MG/1
10 TABLET ORAL EVERY 4 HOURS PRN
Status: DISCONTINUED | OUTPATIENT
Start: 2024-01-30 | End: 2024-01-30 | Stop reason: HOSPADM

## 2024-01-30 RX ORDER — ROPIVACAINE HYDROCHLORIDE 5 MG/ML
INJECTION, SOLUTION EPIDURAL; INFILTRATION; PERINEURAL PRN
Status: DISCONTINUED | OUTPATIENT
Start: 2024-01-30 | End: 2024-01-30 | Stop reason: SDUPTHER

## 2024-01-30 RX ORDER — SODIUM CHLORIDE 9 MG/ML
INJECTION, SOLUTION INTRAVENOUS PRN
Status: DISCONTINUED | OUTPATIENT
Start: 2024-01-30 | End: 2024-01-30 | Stop reason: HOSPADM

## 2024-01-30 RX ORDER — ONDANSETRON 2 MG/ML
4 INJECTION INTRAMUSCULAR; INTRAVENOUS
Status: DISCONTINUED | OUTPATIENT
Start: 2024-01-30 | End: 2024-01-30 | Stop reason: HOSPADM

## 2024-01-30 RX ORDER — OXYCODONE HYDROCHLORIDE 5 MG/1
5 TABLET ORAL
Status: DISCONTINUED | OUTPATIENT
Start: 2024-01-30 | End: 2024-01-30 | Stop reason: HOSPADM

## 2024-01-30 RX ORDER — HYDROMORPHONE HYDROCHLORIDE 1 MG/ML
0.5 INJECTION, SOLUTION INTRAMUSCULAR; INTRAVENOUS; SUBCUTANEOUS EVERY 5 MIN PRN
Status: DISCONTINUED | OUTPATIENT
Start: 2024-01-30 | End: 2024-01-30 | Stop reason: HOSPADM

## 2024-01-30 RX ORDER — KETOROLAC TROMETHAMINE 15 MG/ML
15 INJECTION, SOLUTION INTRAMUSCULAR; INTRAVENOUS EVERY 6 HOURS
Status: DISCONTINUED | OUTPATIENT
Start: 2024-01-30 | End: 2024-01-30 | Stop reason: HOSPADM

## 2024-01-30 RX ORDER — SODIUM CHLORIDE 0.9 % (FLUSH) 0.9 %
5-40 SYRINGE (ML) INJECTION PRN
Status: DISCONTINUED | OUTPATIENT
Start: 2024-01-30 | End: 2024-01-30 | Stop reason: HOSPADM

## 2024-01-30 RX ORDER — IPRATROPIUM BROMIDE AND ALBUTEROL SULFATE 2.5; .5 MG/3ML; MG/3ML
1 SOLUTION RESPIRATORY (INHALATION)
Status: DISCONTINUED | OUTPATIENT
Start: 2024-01-30 | End: 2024-01-30 | Stop reason: HOSPADM

## 2024-01-30 RX ORDER — ONDANSETRON 2 MG/ML
4 INJECTION INTRAMUSCULAR; INTRAVENOUS EVERY 6 HOURS PRN
Status: DISCONTINUED | OUTPATIENT
Start: 2024-01-30 | End: 2024-01-30 | Stop reason: HOSPADM

## 2024-01-30 RX ORDER — SODIUM CHLORIDE, SODIUM LACTATE, POTASSIUM CHLORIDE, CALCIUM CHLORIDE 600; 310; 30; 20 MG/100ML; MG/100ML; MG/100ML; MG/100ML
INJECTION, SOLUTION INTRAVENOUS CONTINUOUS
Status: DISCONTINUED | OUTPATIENT
Start: 2024-01-30 | End: 2024-01-30 | Stop reason: HOSPADM

## 2024-01-30 RX ORDER — PANTOPRAZOLE SODIUM 40 MG/1
40 TABLET, DELAYED RELEASE ORAL ONCE
Status: DISCONTINUED | OUTPATIENT
Start: 2024-01-30 | End: 2024-01-30 | Stop reason: HOSPADM

## 2024-01-30 RX ORDER — HYDROMORPHONE HYDROCHLORIDE 2 MG/1
2 TABLET ORAL EVERY 4 HOURS PRN
Qty: 42 TABLET | Refills: 0 | Status: SHIPPED | OUTPATIENT
Start: 2024-01-30 | End: 2024-02-06

## 2024-01-30 RX ORDER — LABETALOL HYDROCHLORIDE 5 MG/ML
10 INJECTION, SOLUTION INTRAVENOUS
Status: DISCONTINUED | OUTPATIENT
Start: 2024-01-30 | End: 2024-01-30 | Stop reason: HOSPADM

## 2024-01-30 RX ORDER — SODIUM CHLORIDE 0.9 % (FLUSH) 0.9 %
5-40 SYRINGE (ML) INJECTION EVERY 12 HOURS SCHEDULED
Status: DISCONTINUED | OUTPATIENT
Start: 2024-01-30 | End: 2024-01-30 | Stop reason: HOSPADM

## 2024-01-30 RX ADMIN — DEXAMETHASONE SODIUM PHOSPHATE 8 MG: 4 INJECTION, SOLUTION INTRAMUSCULAR; INTRAVENOUS at 10:23

## 2024-01-30 RX ADMIN — DROPERIDOL 0.62 MG: 2.5 INJECTION, SOLUTION INTRAMUSCULAR; INTRAVENOUS at 14:40

## 2024-01-30 RX ADMIN — HYDROMORPHONE HYDROCHLORIDE 0.5 MG: 1 INJECTION, SOLUTION INTRAMUSCULAR; INTRAVENOUS; SUBCUTANEOUS at 14:38

## 2024-01-30 RX ADMIN — SODIUM CHLORIDE, SODIUM LACTATE, POTASSIUM CHLORIDE, AND CALCIUM CHLORIDE: 600; 310; 30; 20 INJECTION, SOLUTION INTRAVENOUS at 10:17

## 2024-01-30 RX ADMIN — MEPIVACAINE HYDROCHLORIDE 52.5 MG: 15 INJECTION, SOLUTION EPIDURAL; INFILTRATION at 12:39

## 2024-01-30 RX ADMIN — OXYCODONE HYDROCHLORIDE 5 MG: 5 TABLET ORAL at 15:29

## 2024-01-30 RX ADMIN — MIDAZOLAM 2 MG: 1 INJECTION INTRAMUSCULAR; INTRAVENOUS at 12:22

## 2024-01-30 RX ADMIN — SODIUM CHLORIDE, SODIUM LACTATE, POTASSIUM CHLORIDE, AND CALCIUM CHLORIDE 1000 ML: .6; .31; .03; .02 INJECTION, SOLUTION INTRAVENOUS at 10:17

## 2024-01-30 RX ADMIN — SODIUM CHLORIDE, SODIUM LACTATE, POTASSIUM CHLORIDE, AND CALCIUM CHLORIDE: 600; 310; 30; 20 INJECTION, SOLUTION INTRAVENOUS at 13:38

## 2024-01-30 RX ADMIN — ACETAMINOPHEN 500 MG: 500 TABLET ORAL at 10:24

## 2024-01-30 RX ADMIN — PROPOFOL 50 MCG/KG/MIN: 10 INJECTION, EMULSION INTRAVENOUS at 12:46

## 2024-01-30 RX ADMIN — LIDOCAINE HYDROCHLORIDE 60 MG: 20 INJECTION, SOLUTION EPIDURAL; INFILTRATION; INTRACAUDAL; PERINEURAL at 12:45

## 2024-01-30 RX ADMIN — MIDAZOLAM 2 MG: 1 INJECTION INTRAMUSCULAR; INTRAVENOUS at 11:02

## 2024-01-30 RX ADMIN — ROPIVACAINE HYDROCHLORIDE 25 ML: 5 INJECTION EPIDURAL; INFILTRATION; PERINEURAL at 11:04

## 2024-01-30 RX ADMIN — PROPOFOL 50 MG: 10 INJECTION, EMULSION INTRAVENOUS at 12:45

## 2024-01-30 RX ADMIN — KETOROLAC TROMETHAMINE 15 MG: 15 INJECTION, SOLUTION INTRAMUSCULAR; INTRAVENOUS at 14:20

## 2024-01-30 RX ADMIN — DEXAMETHASONE SODIUM PHOSPHATE 8 MG: 4 INJECTION, SOLUTION INTRAMUSCULAR; INTRAVENOUS at 14:15

## 2024-01-30 RX ADMIN — TRANEXAMIC ACID 1000 MG: 10 INJECTION, SOLUTION INTRAVENOUS at 12:42

## 2024-01-30 RX ADMIN — ONDANSETRON HYDROCHLORIDE 4 MG: 2 INJECTION INTRAMUSCULAR; INTRAVENOUS at 13:34

## 2024-01-30 RX ADMIN — WATER 2000 MG: 1 INJECTION INTRAMUSCULAR; INTRAVENOUS; SUBCUTANEOUS at 12:42

## 2024-01-30 ASSESSMENT — PAIN DESCRIPTION - LOCATION
LOCATION: KNEE
LOCATION: KNEE
LOCATION: HEAD
LOCATION: HEAD
LOCATION: KNEE

## 2024-01-30 ASSESSMENT — PAIN DESCRIPTION - PAIN TYPE
TYPE: SURGICAL PAIN
TYPE: SURGICAL PAIN;CHRONIC PAIN
TYPE: CHRONIC PAIN;SURGICAL PAIN
TYPE: CHRONIC PAIN;SURGICAL PAIN
TYPE: SURGICAL PAIN

## 2024-01-30 ASSESSMENT — PAIN SCALES - GENERAL
PAINLEVEL_OUTOF10: 5
PAINLEVEL_OUTOF10: 4
PAINLEVEL_OUTOF10: 5
PAINLEVEL_OUTOF10: 0
PAINLEVEL_OUTOF10: 5
PAINLEVEL_OUTOF10: 0
PAINLEVEL_OUTOF10: 5
PAINLEVEL_OUTOF10: 7
PAINLEVEL_OUTOF10: 5

## 2024-01-30 ASSESSMENT — PAIN DESCRIPTION - ORIENTATION
ORIENTATION: LEFT

## 2024-01-30 ASSESSMENT — PAIN DESCRIPTION - DESCRIPTORS
DESCRIPTORS: ACHING;DULL
DESCRIPTORS: DISCOMFORT
DESCRIPTORS: DISCOMFORT
DESCRIPTORS: DULL;SHARP;STABBING
DESCRIPTORS: ACHING

## 2024-01-30 ASSESSMENT — PAIN - FUNCTIONAL ASSESSMENT
PAIN_FUNCTIONAL_ASSESSMENT: ACTIVITIES ARE NOT PREVENTED
PAIN_FUNCTIONAL_ASSESSMENT: 0-10

## 2024-01-30 NOTE — PROGRESS NOTES
childhood    CVA (cerebral vascular accident) (HCC)     2016- due to vasculitis;october 2018. no longer seen for the CVA. speech slurred sometimes, no other effects per pt.    Diabetes (HCC) 2016    no meds    Diastasis recti     Fibromyalgia     Dr. Moraima Eubanks    GERD (gastroesophageal reflux disease)     Hx of cardiomyopathy 2011    pt states cleared of this dx 13 years ago, doesn't see a cardiologist    Hypercholesteremia     Hypertension 2016    h/o. no b/p meds only takes statin    Ill-defined condition 2019    left thumb crush injury, fx of tip of finger    Intertriginous candidiasis 8/8/2019    Iron deficiency 2/19/2020    Low iron     DR. Brewer.  iron infusions stopped 2022, on po at this time    Low testosterone     Migraines 1990    Dr. Khan    Osteoarthritis of left knee 1/29/2024    PONV (postoperative nausea and vomiting)     Primary localized osteoarthritis of right knee 11/21/2019    Prolonged emergence from general anesthesia     Psychiatric disorder     bipolar    Sleep apnea     no c-pap    Tremors of nervous system      Past Surgical History:   Procedure Laterality Date    APPENDECTOMY      GASTRIC BYPASS SURGERY  2019    sleeve    ORTHOPEDIC SURGERY      TORN MENISCUS    ORTHOPEDIC SURGERY  2013    cervical fusion.  [facility]    ORTHOPEDIC SURGERY      CTR both hands    TONSILLECTOMY         Home Situation:  Social/Functional History  Lives With: Spouse  Type of Home: House  Home Layout: One level  Home Access: Stairs to enter with rails  Entrance Stairs - Number of Steps: 6  Entrance Stairs - Rails: Both  Bathroom Shower/Tub: Walk-in shower  Home Equipment: Cane, Walker, rolling  Critical Behavior:  Orientation  Orientation Level: Oriented to place;Oriented to situation;Oriented to person  Cognition  Overall Cognitive Status: WNL  Strength:    Strength: Generally decreased, functional  Tone & Sensation:   Tone: Normal  Sensation: Impaired (L knee)  Range Of Motion:  AROM:

## 2024-01-30 NOTE — ANESTHESIA POSTPROCEDURE EVALUATION
Post-Anesthesia Evaluation and Assessment    Cardiovascular Function/Vital Signs  Visit Vitals  /74   Pulse 72   Temp 36.4 °C (97.5 °F)   Resp 10   Ht 1.753 m (5' 9.02\")   Wt 120.7 kg (266 lb 1.6 oz)   SpO2 99%   BMI 39.28 kg/m²       Patient is status post Procedure(s):  LEFT TOTAL KNEE REPLACEMENT.    Nausea/Vomiting: Controlled.    Postoperative hydration reviewed and adequate.    Pain:      Managed.    Neurological Status:       At baseline.    Mental Status and Level of Consciousness: Arousable.    Pulmonary Status:       Adequate oxygenation and airway patent.    Complications related to anesthesia: None    Post-anesthesia assessment completed. No concerns.    Patient has met all discharge requirements.    Signed By: ENOC Fink - MAZIN    January 30, 2024

## 2024-01-30 NOTE — ANESTHESIA PROCEDURE NOTES
Spinal Block    Patient location during procedure: OR  End time: 1/30/2024 12:39 PM  Reason for block: primary anesthetic  Staffing  Performed: resident/CRNA   Anesthesiologist: Jose Daniel Lindsey MD  Resident/CRNA: Dora Resendiz APRN - CRNA  Performed by: Dora Resendiz APRN - CRNA  Authorized by: Jose Daniel Lindsey MD    Spinal Block  Patient position: sitting  Prep: Betadine  Patient monitoring: continuous pulse ox and frequent blood pressure checks  Approach: midline  Location: L3/L4  Provider prep: mask and sterile gloves  Local infiltration: lidocaine  Needle  Needle type: Pencan   Needle gauge: 24 G  Needle length: 4 in  Assessment  Swirl obtained: Yes  CSF: clear  Attempts: 2  Hemodynamics: stable  Preanesthetic Checklist  Completed: patient identified, IV checked, site marked, risks and benefits discussed, surgical/procedural consents, equipment checked, pre-op evaluation, timeout performed, anesthesia consent given, oxygen available, monitors applied/VS acknowledged, fire risk safety assessment completed and verbalized and blood product R/B/A discussed and consented

## 2024-01-30 NOTE — PERIOP NOTE
Pt. Used restroom in pre-op area with assistance.   Patient placed on Tonya Paws for a minimum of 30 min in  Preop.

## 2024-01-30 NOTE — PERIOP NOTE
TRANSFER - IN REPORT:    Verbal report received from TARA Lopez on Rainer Marrero  being received from PACU for routine progression of patient care      Report consisted of patient's Situation, Background, Assessment and   Recommendations(SBAR).     Information from the following report(s) Intake/Output and MAR was reviewed with the receiving nurse.    Opportunity for questions and clarification was provided.      Assessment completed upon patient's arrival to unit and care assumed.

## 2024-01-30 NOTE — H&P
[facility]  History and Physical Exam    Patient: Rainer Marrero MRN: [id number]  SSN: [id number]    YOB: 1970  Age: 53 y.o.  Sex: male      Subjective:      Chief Complaint: Left knee pain    History of Present Illness:  Patient complains of pain to the left knee and difficulty ambulating, which has progressively worsened over several months.  X-rays showed osteoarthritis of the joint.  The patient's pain has persisted and progressed despite conservative treatments and therapies.  The patient has been previously treated with medications and/or injections.  The patient has at this time opted for surgical intervention.       Past Medical History:   Diagnosis Date    Active advance directive     Adverse effect of anesthesia     slow to awaken    Angina at rest 2016    h/o. denies any recent angina    Arthritis     Autoimmune disease ([facility])     fibromyalgia    Bipolar 1 disorder ([facility])     [facility]    Cerebral vasculitis     x 2    Concussion     childhood    CVA (cerebral vascular accident) ([facility])     2016- due to vasculitis;october 2018. no longer seen for the CVA. speech slurred sometimes, no other effects per pt.    Diabetes ([facility]) 2016    no meds    Diastasis recti     Fibromyalgia     [facility]. [facility]ia[facility] [place]    GERD (gastroesophageal reflux disease)     Hx of cardiomyopathy 2011    pt states cleared of this dx 13 years ago, doesn't see a cardiologist    Hypercholesteremia     Hypertension 2016    h/o. no b/p meds only takes statin    Ill-defined condition 2019    left thumb crush injury, fx of tip of finger    Intertriginous candidiasis 8/8/2019    Iron deficiency 2/19/2020    Low iron     DR. Brewer.  iron infusions stopped 2022, on po at this time    Low testosterone     Migraines 1990    Dr. Khan    Osteoarthritis of left knee 1/29/2024    PONV (postoperative nausea and vomiting)     Primary localized osteoarthritis of right knee

## 2024-01-30 NOTE — DISCHARGE INSTRUCTIONS
doctor now or seek immediate medical care if:    You are dizzy or lightheaded, or you feel like you may faint.     You have bleeding that starts again or gets worse, such as soaking one or more bandages over 2 to 4 hours, even after holding pressure on the area.   Watch closely for changes in your health, and be sure to contact your doctor if:    You do not get better as expected.   Where can you learn more?  Go to [email or web address] and enter D915 to learn more about \"Bleeding After Surgery: Care Instructions.\"  Current as of: July 26, 2023               Content Version: 13.9  © 4601-3824 [employer].   Care instructions adapted under license by "[employer]". If you have questions about a medical condition or this instruction, always ask your healthcare professional. [employer] disclaims any warranty or liability for your use of this information.    __________________________________________________________________________________________________________________________________

## 2024-01-30 NOTE — ANESTHESIA PROCEDURE NOTES
Peripheral Block    Patient location during procedure: pre-op  Reason for block: post-op pain management and at surgeon's request  Start time: 1/30/2024 11:02 AM  End time: 1/30/2024 11:04 AM  Staffing  Performed: anesthesiologist   Anesthesiologist: Jose Daniel Lindsey MD  Performed by: Jose Daniel Lindsey MD  Authorized by: Jose Daniel Lindsey MD    Preanesthetic Checklist  Completed: patient identified, IV checked, site marked, risks and benefits discussed, surgical/procedural consents, equipment checked, pre-op evaluation, timeout performed, anesthesia consent given, oxygen available, monitors applied/VS acknowledged, fire risk safety assessment completed and verbalized and blood product R/B/A discussed and consented  Peripheral Block   Patient position: supine  Prep: ChloraPrep  Provider prep: mask  Patient monitoring: cardiac monitor, continuous pulse ox, frequent blood pressure checks, IV access, oxygen and responsive to questions  Block type: Femoral  Adductor canal  Laterality: left  Injection technique: single-shot  Guidance: ultrasound guided  Local infiltration: lidocaine  Infiltration strength: 2 %  Local infiltration: lidocaine  Dose: 3 mL    Needle   Needle type: insulated echogenic nerve stimulator needle   Needle gauge: 21 G  Needle localization: anatomical landmarks and ultrasound guidance  Needle length: 5 cm  Assessment   Injection assessment: negative aspiration for heme, no paresthesia on injection, local visualized surrounding nerve on ultrasound and no intravascular symptoms  Paresthesia pain: none  Slow fractionated injection: yes  Hemodynamics: stable  Real-time US image taken/store: yes  Outcomes: uncomplicated

## 2024-01-30 NOTE — PERIOP NOTE
TRANSFER - IN REPORT:    Verbal report received from Miracle Cobb CRNA RN  on Rainer Marrero  being received from OR  for routine progression of patient care      Report consisted of patient's Situation, Background, Assessment and   Recommendations(SBAR).     Information from the following report(s) Surgery Report, Intake/Output, and MAR was reviewed with the receiving nurse.    Opportunity for questions and clarification was provided.      Assessment completed upon patient's arrival to unit and care assumed.

## 2024-01-30 NOTE — DISCHARGE SUMMARY
[facility]TI[facility] [address] [address]     DISCHARGE SUMMARY     PATIENT: Rainer Marrero     MRN: [id number]   ADMIT DATE: 2024   [facility]   DISCHARGE DATE: 2024     ATTENDING: Bruce Esquivel MD   DICTATING: SHAHID Monroy         ADMISSION DIAGNOSIS: Primary osteoarthritis of left knee [M17.12]    DISCHARGE DIAGNOSIS: Status post LEFT TOTAL KNEE ARTHROPLASTY    HISTORY OF PRESENT ILLNESS: The patient is a 53 y.o. year-old male   with ongoing left knee pain secondary to osteoarthritis of his left knee. The patient's pain has persisted and progressed despite conservative treatments and therapies. The patient has at this time opted for surgical intervention.    PAST MEDICAL HISTORY:   Past Medical History:   Diagnosis Date    Active advance directive     Adverse effect of anesthesia     slow to awaken    Angina at rest 2016    h/o. denies any recent angina    Arthritis     Autoimmune disease ([facility])     fibromyalgia    Bipolar 1 disorder ([facility])     [facility]    Cerebral vasculitis     x 2    Concussion     childhood    CVA (cerebral vascular accident) ([facility])     - due to vasculitis;2018. no longer seen for the CVA. speech slurred sometimes, no other effects per pt.    Diabetes ([facility]) 2016    no meds    Diastasis recti     [facility]    GERD (gastroesophageal reflux disease)     Hx of cardiomyopathy     pt states cleared of this dx 13 years ago, doesn't see a cardiologist    Hypercholesteremia     Hypertension 2016    h/o. no b/p meds only takes statin    Ill-defined condition     left thumb crush injury, fx of tip of finger    Intertriginous candidiasis 2019    Iron deficiency 2020    Low iron     DR. Brewer.  iron infusions stopped , on po at this time    Low testosterone     Migraines     Dr. Khan    Osteoarthritis of left knee 2024    PONV

## 2024-01-30 NOTE — INTERVAL H&P NOTE
Update History & Physical    The patient's History and Physical of January 29, 2024 was reviewed with the patient and I examined the patient. There was no change. The surgical site was confirmed by the patient and me.     Plan: The risks, benefits, expected outcome, and alternative to the recommended procedure have been discussed with the patient. Patient understands and wants to proceed with the procedure.     Electronically signed by JASON ORTIZ MD on 1/30/2024 at 9:17 AM

## 2024-01-30 NOTE — ANESTHESIA PRE PROCEDURE
Department of Anesthesiology  Preprocedure Note       Name:  Rainer Marrero   Age:  53 y.o.  :  1970                                          MRN:  [id number]         Date:  2024      Surgeon: Surgeon(s):  Bruce Esquivel MD    Procedure: Procedure(s):  LEFT TOTAL KNEE REPLACEMENT    Medications prior to admission:   Prior to Admission medications    Medication Sig Start Date End Date Taking? Authorizing Provider   fexofenadine (ALLEGRA ALLERGY) 180 MG tablet Take 1 tablet by mouth daily    Bart Fontanez MD   DICLOFENAC POTASSIUM PO Take 75 mg by mouth daily    Bart Fontanez MD   esomeprazole Magnesium (NEXIUM) 40 MG PACK Take 1 packet by mouth 2 times daily    Bart Fontanez MD   methocarbamol (ROBAXIN) 500 MG tablet Take 1 tablet by mouth 4 times daily    Bart Fontanez MD   primidone (MYSOLINE) 50 MG tablet Take 1 tablet by mouth nightly Indications: headache    Bart Fontanez MD   diazePAM (VALIUM) 5 MG tablet Take 1 tablet by mouth 3 times daily as needed for Anxiety for up to 180 days. Max Daily Amount: 15 mg  Patient taking differently: Take 1 tablet by mouth in the morning and at bedtime. 23  Cristian Snyder MD   gabapentin (NEURONTIN) 600 MG tablet Take 2 tablets by mouth 3 times daily for 180 days. Max Daily Amount: 3,600 mg 10/4/23 4/1/24  Cristian Snyder MD   OXcarbazepine (TRILEPTAL) 600 MG tablet Take 1 tablet by mouth 2 times daily 10/4/23   Cristian Snyder MD   traZODone (DESYREL) 150 MG tablet Take 1 tablet by mouth nightly 10/4/23   Cristian Snyder MD   risperiDONE (RISPERDAL) 2 MG tablet Take 1 tablet by mouth nightly 10/4/23   Cristian Snyder MD   QUEtiapine (SEROQUEL) 300 MG tablet Take 1 tablet by mouth nightly  Patient not taking: Reported on 2024 10/4/23   Cristian Snyder MD   DULoxetine (CYMBALTA) 30 MG extended release capsule Take 1 capsule by mouth daily Take with the 60 mg cap--total of 90 mg daily

## 2024-01-30 NOTE — OP NOTE
Assistant: Steph Lares  Physician Assistant: Tiarra Martinez PA    Anesthesia: Spinal    Estimated Blood Loss (mL): less than 100     Complications: None    Specimens:   * No specimens in log *    Implants:  Implant Name Type Inv. Item Serial No.  Lot No. LRB No. Used Action   INSERT TIB CNDYL STBL 5 16 MM KNEE 5/PK X3 TRIATHLON - [id number]  INSERT TIB CNDYL STBL 5 16 MM KNEE 5/PK X3 TRIATHLON  [employer] [id number] Left 1 Implanted   COMPONENT FEM SZ 4 L KNEE CRUCE RET CEMENTLESS BEAD W/ NAHUM - [id number]  COMPONENT FEM SZ 4 L KNEE CRUCE RET CEMENTLESS BEAD W/ NAHUM  [employer] [id number] Left 1 Implanted   BASEPLATE TIB SZ 5 AP49MM ML74MM KNEE TRITANIUM 4 CRUCFRM - [id number]  BASEPLATE TIB SZ 5 AP49MM ML74MM KNEE TRITANIUM 4 CRUCFRM  [employer] Wagon- [id number] Left 1 Implanted   COMPONENT PAT [id number] SUPERIOR/INFERIOR KNEE - [id number]  COMPONENT PAT [id number] SUPERIOR/INFERIOR KNEE  [employer] W[employer] [id number] Left 1 Implanted         Drains: * No LDAs found *    Findings: djd knee        Detailed Description of Procedure:   See above note    Electronically signed by JASON ORTIZ MD on 1/30/2024 at 1:37 PM

## 2024-01-30 NOTE — PERIOP NOTE
TRANSFER - OUT REPORT:    Verbal report given to Ben PACHECO  on Rainer Marrero  being transferred to Phase II  for routine progression of patient care       Report consisted of patient's Situation, Background, Assessment and   Recommendations(SBAR).     Information from the following report(s) Adult Overview, Surgery Report, Intake/Output, and MAR was reviewed with the receiving nurse.           Lines:   Peripheral IV 01/30/24 Left;Proximal Forearm (Active)   Site Assessment Clean, dry & intact 01/30/24 1408   Line Status Infusing 01/30/24 1408   Line Care Connections checked and tightened 01/30/24 1017   Phlebitis Assessment No symptoms 01/30/24 1017   Infiltration Assessment 0 01/30/24 1017   Alcohol Cap Used No 01/30/24 1017   Dressing Status New dressing applied 01/30/24 1017   Dressing Type Transparent 01/30/24 1017   Dressing Intervention New 01/30/24 1017        Opportunity for questions and clarification was provided.      Patient transported with:  Tech

## 2024-02-08 ENCOUNTER — CLINICAL DOCUMENTATION (OUTPATIENT)
Facility: HOSPITAL | Age: 54
End: 2024-02-08

## 2024-02-08 NOTE — PROGRESS NOTES
Pt/spouse called with questions about vitamin/mineral supplementation needs for pt who is approx. 5 yrs S/P laparoscopic sleeve gastrectomy procedure.  Vitamin recommendations were reviewed and coupon codes for bariatric specific supplements were emailed to pt.  All current nutrition-related questions answered.  Pt/spouse encouraged to call with any future nutrition-related questions; pt has RD contact information.    RD: Divya Mclean MS, RD

## 2024-03-04 DIAGNOSIS — F31.61 BIPOLAR AFFECTIVE DISORDER, MIXED, MILD (HCC): ICD-10-CM

## 2024-03-04 RX ORDER — GABAPENTIN 600 MG/1
1200 TABLET ORAL 3 TIMES DAILY
Qty: 180 TABLET | Refills: 5 | Status: SHIPPED | OUTPATIENT
Start: 2024-03-04 | End: 2024-08-31

## 2024-03-15 RX ORDER — DULOXETIN HYDROCHLORIDE 60 MG/1
60 CAPSULE, DELAYED RELEASE ORAL DAILY
Qty: 30 CAPSULE | Refills: 5 | Status: SHIPPED | OUTPATIENT
Start: 2024-03-15

## 2024-03-15 RX ORDER — DULOXETIN HYDROCHLORIDE 30 MG/1
30 CAPSULE, DELAYED RELEASE ORAL DAILY
Qty: 30 CAPSULE | Refills: 5 | Status: SHIPPED | OUTPATIENT
Start: 2024-03-15

## 2024-03-20 RX ORDER — TRAZODONE HYDROCHLORIDE 150 MG/1
150 TABLET ORAL NIGHTLY
Qty: 30 TABLET | Refills: 11 | Status: SHIPPED | OUTPATIENT
Start: 2024-03-20

## 2024-03-20 RX ORDER — RISPERIDONE 2 MG/1
2 TABLET ORAL
Qty: 30 TABLET | Refills: 11 | Status: SHIPPED | OUTPATIENT
Start: 2024-03-20

## 2024-03-20 RX ORDER — OXCARBAZEPINE 600 MG/1
600 TABLET, FILM COATED ORAL 2 TIMES DAILY
Qty: 60 TABLET | Refills: 11 | Status: SHIPPED | OUTPATIENT
Start: 2024-03-20

## 2024-04-10 ENCOUNTER — HOSPITAL ENCOUNTER (OUTPATIENT)
Facility: HOSPITAL | Age: 54
Discharge: HOME | End: 2024-04-13
Payer: COMMERCIAL

## 2024-04-10 DIAGNOSIS — F31.61 BIPOLAR AFFECTIVE DISORDER, MIXED, MILD (HCC): ICD-10-CM

## 2024-04-10 PROCEDURE — 99443 PR PHYS/QHP TELEPHONE EVALUATION 21-30 MIN: CPT | Performed by: PSYCHIATRY & NEUROLOGY

## 2024-04-10 RX ORDER — DIAZEPAM 5 MG/1
5 TABLET ORAL 4 TIMES DAILY PRN
Qty: 120 TABLET | Refills: 5 | Status: SHIPPED | OUTPATIENT
Start: 2024-04-10 | End: 2024-10-07

## 2024-04-10 RX ORDER — QUETIAPINE FUMARATE 300 MG/1
300 TABLET, FILM COATED ORAL NIGHTLY
Qty: 90 TABLET | Refills: 3 | Status: SHIPPED | OUTPATIENT
Start: 2024-04-10

## 2024-04-10 RX ORDER — BUPROPION HYDROCHLORIDE 300 MG/1
300 TABLET ORAL EVERY MORNING
Qty: 90 TABLET | Refills: 3 | Status: SHIPPED | OUTPATIENT
Start: 2024-04-10

## 2024-04-10 NOTE — PROGRESS NOTES
mixed, very mild (F31.61).                 2.  Alcohol Use Disorder in remission for many years (F10.21).  Axis II     Deferred.  Axis III   Occipital CVA d/t vasculitis; severe DJD with chronic pain; HTN; obesity; HLD; migraines; and GERD.     PLAN:  1.  Continue the Risperidone at 2 mg qhs--has 11+ months of refills (30 day)  2.  Continue the Trileptal at 600 mg bid--has 11+ months of refills (30 day)    3.  Continue the Gabapentin at 1200 mg tid--has 5 months of refills (30 day)   4.  Continue the Trazodone at 300 mg qhs prn--has 11+ months of refills (30 day)  5.  Continue the Diazepam at 5 mg qid prn--#120 with 5 refills (30 day)  6.  Continue the Seroquel at 300 mg qhs--#90 with 3 refills (90 day)  7.  Continue the Cymbalta at 90 mg daily (60 and 30)--has 11+ months of refills of each (30 day)  8.  Continue the Wellbutrin XL at 300 mg daily--#90 with 3 refills (90 day)  9.  Seeing a therapist monthly.  10. Follow up with me in 4 months, sooner if needed.      I affirm this is a Patient-Initiated-Episode with a patient who has not had a related appointment within my department in the past 7 days or scheduled within the next 24 hours.  Total Time: 23 minutes  Note: not billable if the call serves to triage the patient into an appointment for the relevant concern. Patient was evaluated by phone call and had no access to a computer or smart phone. Chart reviewed. Evaluated and management per the note above. POS: patient at home; provider in office.

## 2024-05-15 NOTE — PROGRESS NOTES
Patient Specific Counseling (Will Not Stick From Patient To Patient): -\\nSD counsels pt site appears to be healing well and discusses that areas of broken down tissue will continue to heal over the next 3 months. SD emphasizes importance of daily sunscreen use to prevent further irritation and lasting erythema across scar.\\n\\nSD counsels regarding dermabrasion as future tx option in around 3 months if Pt is not satisfied with appearance of scar at that time.\\n\\nSD discusses internal stitches will continue to dissolve for up to 3 months and that raised nature of scar will continue to reduce as stitches dissolve.\\n\\nPt notes concerns of washing Vaseline across her hair and bandaging daily. SD discusses that if Pt does not wish to continue with bandaging site then can apply streistrips to site today, pt declines and opts to proceed with established wound care regimen at home. Subjective: Sherrie Okeefe is a 46 y.o. male is now 4 months status post laparoscopic sleeve gastrectomy. Doing well overall. He has lost a total of 69 pounds since surgery. Body mass index is 36.95 kg/m². Has lost 40% of EBW. Currently on a solid food diet without difficulty, reports no real issues and denies vomiting, abdominal pain, diarrhea and nausea. Taking in 80 oz water daily. The patients diet choices have been reviewed today and counseling was given. Breakfast: shake      Lunch: veggie burger, broccoli      Snack: pork rinds, cheese, cashews      Supper :shake  Wife thinks he eats too much cheese. Gets hungry some days and grazes. 30 min of activity 7 days a week, including walking. Bowel movements are alternating constipation and regularity. Using zipsor which usually causes loose stools, so seems to be counteracting. The patient is not having any pain. . The patient is compliant with multivitamins, calcium, Vit D and B12 supplements.      Weight Loss Metrics 6/4/2019 4/2/2019 3/5/2019 2/20/2019 2/5/2019 2/1/2019 1/31/2019   Today's Wt 250 lb 3.2 oz 273 lb 280 lb 8 oz 283 lb 6.4 oz 308 lb 14.4 oz 318 lb 3.2 oz 319 lb   BMI 36.95 kg/m2 40.32 kg/m2 41.42 kg/m2 41.85 kg/m2 45.62 kg/m2 46.99 kg/m2 47.11 kg/m2          Comorbidities:    Hypertension: improved, on Rx, decreased dose by 1/2 at last visit, denies lightheadedness of dizziness  Diabetes: improved, no medications, last blood sugar 103  Obstructive Sleep Apnea: improved, stopped CPAP has f/u with pulmonologist in July, wife says doesn't snore anymore, sleeping 8-9 hours  Hyperlipidemia: unchanged, on statin  Stress Urinary Incontinence: not applicable  Gastroesophageal Reflux: improved, on PPI   Weight related arthropathy:worsened, knees, going to make appt with ortho for consult      Patient Active Problem List   Diagnosis Code    Bipolar affective disorder, mixed, mild ([facility]) F31.61    Alcohol dependence in remission ([address] F10.21    Obesity, morbid ([address] E66.01    Morbid obesity ([facility]) E66.01    Low testosterone R79.89    Bipolar 1 disorder ([facility]) F31.9    GERD (gastroesophageal reflux disease) K21.9    Hypertension I10    Tremors of nervous system R25.1    Hypercholesteremia E78.00    Arthritis of both knees M17.0    Arthritis of spine M47.819    Arthritis of both hands M19.041, M19.042    Migraines G43.909    Sleep apnea G47.30    Hx of cardiomyopathy Z86.79    Diastasis recti M62.08    Type 2 diabetes mellitus with diabetic neuropathy ([facility]) E11.40    CVA (cerebral vascular accident) ([address] I63.9    Angina at rest ([address] I20.8    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    Arthritis M19.90    Intestinal malabsorption K90.9    S/P laparoscopic sleeve gastrectomy Z98.84        Past Medical History:   Diagnosis Date    Adverse effect of anesthesia     slow to awaken    Angina at rest [facility])     Arthritis     Bipolar 1 disorder ([address]     Cerebral vasculitis     x 2    Concussion     childhood    CVA (cerebral vascular accident) ([address]     2016- due to vasculitis;october 2018    Diabetes ([address] 2016    Diastasis recti     Fibromyalgia     GERD (gastroesophageal reflux disease)     Hx of cardiomyopathy     ? reason for diagnosis 10 years ago    Hypercholesteremia     Hypertension 2016    Low testosterone     Migraines     Morbid obesity ([address]     Morbid obesity with BMI of 45.0-49.9, adult ([address]     Psychiatric disorder     bipolar    Sleep apnea     cpap     Tremors of nervous system        Past Surgical History:   Procedure Laterality Date    HX APPENDECTOMY      HX ORTHOPAEDIC      CTR both hands    HX ORTHOPAEDIC      cervical fusion    HX ORTHOPAEDIC      TORN MENISCUS    HX TONSILLECTOMY         Current Outpatient Medications   Medication Sig Dispense Refill    azaTHIOprine (IMURAN) 50 mg tablet Take 100 mg by mouth.       carBAMazepine (TEGRETOL) 200 mg tablet 200 mg.      diclofenac potassium Detail Level: Detailed (ZIPSOR) 25 mg capsule Take 1 Cap by mouth.  cetirizine (ZYRTEC) 10 mg tablet Take 1 Tab by mouth.  erenumab-aooe (AIMOVIG AUTOINJECTOR) 70 mg/mL injection by SubCUTAneous route.  nitroglycerin (NITROSTAT) 0.4 mg SL tablet place 1 tablet under the tongue if needed every 5 minutes for lj...  (REFER TO PRESCRIPTION NOTES).  testosterone cypionate (DEPOTESTOTERONE CYPIONATE) 200 mg/mL injection 300 mg by IntraMUSCular route.  temazepam (RESTORIL) 15 mg capsule Take 1 Cap by mouth.  ursodiol (ACTIGALL) 500 mg tablet Take 500 mg by mouth.  buPROPion XL (WELLBUTRIN XL) 300 mg XL tablet Take 1 Tab by mouth every morning. 30 Tab 5    DULoxetine (CYMBALTA) 60 mg capsule Take 1 Cap by mouth daily. 30 Cap 5    gabapentin (NEURONTIN) 600 mg tablet Take 2 Tabs by mouth two (2) times a day AND 1 Tab daily (after lunch). Indications: Neuropathic Pain 150 Tab 5    QUEtiapine (SEROQUEL) 300 mg tablet Take 1 Tab by mouth nightly. Indications: Bipolar Disorder in Remission 30 Tab 5    risperiDONE (RISPERDAL) 2 mg tablet Take 1 Tab by mouth nightly. 30 Tab 5    traZODone (DESYREL) 100 mg tablet Take 1 Tab by mouth nightly as needed for Sleep. 30 Tab 5    losartan (COZAAR) 50 mg tablet Take 25 mg by mouth daily.  primidone (MYSOLINE) 50 mg tablet Take  by mouth nightly.  B.infantis-B.ani-B.long-B.bifi (PROBIOTIC 4X) 10-15 mg TbEC Take  by mouth daily.  onabotulinumtoxinA (BOTOX INJECTION) by IntraMUSCular route every three (3) months.  Omeprazole delayed release (PRILOSEC D/R) 20 mg tablet Take 40 mg by mouth two (2) times a day.  atorvastatin (LIPITOR) 20 mg tablet Take 40 mg by mouth nightly.          Allergies   Allergen Reactions    Sudafed [Pseudoephedrine Hcl] Other (comments)     Pt advised not to take due to stroke in aug '16       Review of Symptoms:       General - No history or complaints of unexpected fever or chills  Head/Neck - No history or complaints of headache or dizziness  Cardiac - No history or complaints of chest pain, palpitations, or shortness of breath  Pulmonary - No history or complaints of shortness of breath or productive cough  Gastrointestinal - as noted above  Genitourinary - No history or complaints of hematuria/dysuria or renal lithiasis  Musculoskeletal - No history or complaints of joint  muscular weakness  Hematologic - No history of any bleeding episodes  Neurologic - No history or complaints of  migraine headaches or neurologic symptoms        Objective:     Visit Vitals  /70 (BP 1 Location: Left arm, BP Patient Position: Sitting)   Pulse 70   Temp 97.6 °F (36.4 °C)   Ht 5' 9\" (1.753 m)   Wt 113.5 kg (250 lb 3.2 oz)   SpO2 99%   BMI 36.95 kg/m²       General:  alert, cooperative, no distress, appears stated age   Chest: lungs clear to auscultation, breath sounds equal and symmetric, no rhonchi, rales or wheezes, no accessory muscle use   Cor:   Regular rate and rhythm, S1S2 present or without murmur or extra heart sounds   Abdomen: soft, bowel sounds active, non-tender, no masses or organomegaly   Incisions:   healing well, no drainage, no erythema, no hernia, no seroma, no swelling, no dehiscence, incision well approximated       Assessment:   History of Morbid obesity, status post laparoscopic sleeve gastrectomy. Doing well postoperatively. Recommend meeting with dietitian to ensure adequate protein and calorie intake. Constipation - continue using zipsor, can use miralax or MOM  Hyperlipidemia - continue meds,  f/u PCP  HTN - on Rx, f/u with PCP/cardiologist  GERD - continue PPI, had been on therapy prior to surgery, can consider taper at later date, possible after knee surgery    Plan:     1. Follow up with Registered Dietician  2. Discussed patients weight loss goals and dietary choices in relation to goals. 3. Reminded to measure portions, continue high protein, low carbohydrate diet.   Reminded to eat regularly, to eat slowly & not to drink with meals. 4. Continue vitamin supplementation  5. Continue current medications and follow up with PCP for management of regimen. 6. Continue cardio exercise and add resistance exercises. 60-90 minutes of aerobic activity 5 days a week and strength training 2 days each week. 7. Encouraged to attend support group   8. Patient to complete labs before next visit. Lab slip given today. 9. I have discussed this plan with patient and they verbalized understanding  10. Follow up in 2 months or sooner if patient has questions, concerns or worsening of condition, if unable to reach our office, patient should report to the ED. 6. Mr. Colleen Gomez has a reminder for a \"due or due soon\" health maintenance. I have asked that he contact his primary care provider for a follow-up on this health maintenance. 1 person assist

## 2024-08-29 ENCOUNTER — HOSPITAL ENCOUNTER (OUTPATIENT)
Facility: HOSPITAL | Age: 54
Discharge: HOME | End: 2024-09-01
Payer: COMMERCIAL

## 2024-08-29 DIAGNOSIS — F31.61 BIPOLAR AFFECTIVE DISORDER, MIXED, MILD (HCC): ICD-10-CM

## 2024-08-29 DIAGNOSIS — F90.0 ATTENTION DEFICIT HYPERACTIVITY DISORDER, INATTENTIVE TYPE: Primary | ICD-10-CM

## 2024-08-29 PROBLEM — F10.21 ALCOHOL DEPENDENCE, IN REMISSION (HCC): Status: ACTIVE | Noted: 2017-07-07

## 2024-08-29 PROCEDURE — 99214 OFFICE O/P EST MOD 30 MIN: CPT | Performed by: PSYCHIATRY & NEUROLOGY

## 2024-08-29 RX ORDER — ATOMOXETINE 40 MG/1
40 CAPSULE ORAL DAILY
Qty: 30 CAPSULE | Refills: 2 | Status: SHIPPED | OUTPATIENT
Start: 2024-08-29

## 2024-08-29 RX ORDER — GABAPENTIN 600 MG/1
1200 TABLET ORAL 3 TIMES DAILY
Qty: 180 TABLET | Refills: 5 | Status: SHIPPED | OUTPATIENT
Start: 2024-08-29 | End: 2025-02-25

## 2024-08-29 NOTE — PROGRESS NOTES
INTERVAL HISTORY (In Person):  Mr. Marrero is a 53-year-old  male following up with me 4.5 months since his last appointment concerning a longstanding history of Bipolar Disorder for which he developed some residual mixed and depressive symptoms since suffering an occipital lobe CVA on 08/27/16, secondary to vasculitis. He'd been much more stable for some time and we began to decrease the meds by dropping the Trileptal, and decreasing the Risperidone from 4 mg to 2 mg secondary to a gastric sleeve procedure done in early Feb 2019. We've since restarted the Trileptal (initially just 300 mg daily) due to some agitation and dysphoria, and more recently increased the Trazodone to 300 mg due to worsened insomnia. Since then, we've increased the Trileptal to 300 mg tid and changed the Clonazepam (rare use) to Diazepam at the 5 mg dose. Over 2 years ago, I increased the GBP and the Diazepam at the request of his Pain MD, and he was feeling \"pretty good\" the next time. However, since then he's been much more buchanan and irritable, much of it fueled by his chronic pain. Fourteen months ago I increased the Cymbalta to 90 mg due to some worsening dysphoria, and the next 2 times he was moderately better, and last time he was close to euthymic.     He comes in  today with his wife and states that he's not been feeling that well emotionally, and that he's still struggling with significant pain issues. He now has strong testicular pain that 2 Urologists can't explain. However, despite the pain and ongoing stressors, he feels his mood has been manageable. His wife brought up the question of whether he may have ADHD as their daughter and some other relatives have been Dx'ed with it when they originally were thought to have BPAD. His responses on the ASRS-v1.1 were moderately to strongly suggestive of ADHD, and his wife's responses to another screen were very strongly suggestive. We discussed Rx options at length and I will first

## 2024-09-25 RX ORDER — DULOXETIN HYDROCHLORIDE 30 MG/1
30 CAPSULE, DELAYED RELEASE ORAL DAILY
Qty: 30 CAPSULE | Refills: 5 | Status: SHIPPED | OUTPATIENT
Start: 2024-09-25

## 2024-09-25 RX ORDER — DULOXETIN HYDROCHLORIDE 60 MG/1
60 CAPSULE, DELAYED RELEASE ORAL DAILY
Qty: 30 CAPSULE | Refills: 5 | Status: SHIPPED | OUTPATIENT
Start: 2024-09-25

## 2024-10-21 ENCOUNTER — HOSPITAL ENCOUNTER (OUTPATIENT)
Facility: HOSPITAL | Age: 54
Discharge: HOME | End: 2024-10-24
Payer: COMMERCIAL

## 2024-10-21 DIAGNOSIS — F31.61 BIPOLAR DISORDER, CURRENT EPISODE MIXED, MILD (HCC): Primary | ICD-10-CM

## 2024-10-21 PROCEDURE — 99443 PR PHYS/QHP TELEPHONE EVALUATION 21-30 MIN: CPT | Performed by: PSYCHIATRY & NEUROLOGY

## 2024-10-21 RX ORDER — DIAZEPAM 5 MG/1
5 TABLET ORAL 4 TIMES DAILY PRN
Qty: 120 TABLET | Refills: 5 | Status: SHIPPED | OUTPATIENT
Start: 2024-10-21 | End: 2025-04-19

## 2024-10-21 RX ORDER — ATOMOXETINE 80 MG/1
80 CAPSULE ORAL DAILY
Qty: 30 CAPSULE | Refills: 5 | Status: SHIPPED | OUTPATIENT
Start: 2024-10-21

## 2024-10-21 NOTE — PROGRESS NOTES
fairly good.  His cognitive functioning was relatively stable and unchanged, with some deficits noted in the areas of concentration and attention span.      DIAGNOSTIC IMPRESSION:  Axis I     1.  Bipolar Disorder, mixed, mild (F31.61).                 2.  Possible ADHD, inattentive type (F90.0)                 3.  Alcohol Use Disorder in remission for many years (F10.21).  Axis II     Deferred.  Axis III   Occipital CVA d/t vasculitis; severe DJD with chronic pain; HTN; obesity; HLD; migraines; and GERD.     PLAN:  Increase the Strattera at 80 mg daily--#30 with refills (30 day). May change to a stimulant unless more agitated.  Continue the Risperidone at 2 mg qhs--has 5 months of refills (30 day)  Continue the Trileptal at 600 mg bid--has 5 months of refills (30 day)  Continue the Gabapentin at 1200 mg tid--has 4 months of refills (30 day)  Continue the Trazodone at 300 mg qhs prn--has 5 months of refills (30 day)  Continue the Diazepam at 5 mg qid prn--#120 with 5 refills (30 day)  Continue the Seroquel at 300 mg qhs--has 5.5 months of refills (90 day)  Continue the Cymbalta at 90 mg daily (60 and 30)--has 5 months of refills of each (30 day)  Continue the Wellbutrin XL at 300 mg daily--has 5.5 months of refills (90 day)  Follow up with me in 3 months, sooner if needed.    I affirm this is a Patient-Initiated-Episode with a patient who has not had a related appointment within my department in the past 7 days or scheduled within the next 24 hours.  Total Time: 24 minutes  Note: not billable if the call serves to triage the patient into an appointment for the relevant concern. Patient was evaluated by phone call and had no access to a computer or smart phone. Chart reviewed. Evaluated and management per the note above. POS: patient at home; provider in office.

## 2024-10-30 DIAGNOSIS — F31.61 BIPOLAR AFFECTIVE DISORDER, MIXED, MILD (HCC): ICD-10-CM

## 2024-10-30 RX ORDER — GABAPENTIN 600 MG/1
1200 TABLET ORAL 3 TIMES DAILY
Qty: 180 TABLET | Refills: 5 | Status: SHIPPED | OUTPATIENT
Start: 2024-10-30 | End: 2025-04-28

## 2025-01-20 ENCOUNTER — OFFICE VISIT (OUTPATIENT)
Age: 55
End: 2025-01-20
Payer: COMMERCIAL

## 2025-01-20 VITALS
SYSTOLIC BLOOD PRESSURE: 139 MMHG | BODY MASS INDEX: 44.11 KG/M2 | WEIGHT: 297.8 LBS | HEIGHT: 69 IN | OXYGEN SATURATION: 100 % | DIASTOLIC BLOOD PRESSURE: 88 MMHG | RESPIRATION RATE: 16 BRPM | TEMPERATURE: 97.1 F | HEART RATE: 69 BPM

## 2025-01-20 DIAGNOSIS — Z98.84 S/P LAPAROSCOPIC SLEEVE GASTRECTOMY: ICD-10-CM

## 2025-01-20 DIAGNOSIS — K90.9 INTESTINAL MALABSORPTION, UNSPECIFIED TYPE: ICD-10-CM

## 2025-01-20 DIAGNOSIS — K21.9 GASTROESOPHAGEAL REFLUX DISEASE, UNSPECIFIED WHETHER ESOPHAGITIS PRESENT: Primary | ICD-10-CM

## 2025-01-20 DIAGNOSIS — E66.01 MORBID OBESITY WITH BODY MASS INDEX (BMI) OF 40.0 TO 49.9: ICD-10-CM

## 2025-01-20 PROCEDURE — 3075F SYST BP GE 130 - 139MM HG: CPT | Performed by: SPECIALIST

## 2025-01-20 PROCEDURE — 3079F DIAST BP 80-89 MM HG: CPT | Performed by: SPECIALIST

## 2025-01-20 PROCEDURE — 99214 OFFICE O/P EST MOD 30 MIN: CPT | Performed by: SPECIALIST

## 2025-01-20 RX ORDER — TRAZODONE HYDROCHLORIDE 150 MG/1
150 TABLET ORAL NIGHTLY
Qty: 30 TABLET | Refills: 11 | Status: SHIPPED | OUTPATIENT
Start: 2025-01-20

## 2025-01-20 NOTE — PROGRESS NOTES
319/165  Subjective:     Rainer Marrero  is a 54 y.o. male who presents for follow-up about 6 years following sleeve resection. He has lost a total of 22 pounds since surgery.  Body mass index is 43.95 kg/m².. EBWL is (13%). The patient presents today to assess their progress toward their goal of weight loss and to address any issues that may be present.  Today the patient and I have reviewed their diet and how appropriate their food choices are.  The following issues have been identified - his last office encounter was in Feb 2023 at (-)40 lbs = 24%.  He had an EGD in Oct 2024 for GERD with reports and documents pasted below.  Of note is the fact he can not remember his lowest weight.  He has been physically hindered for about a year due to ortho surgeries and complications and is in a rolling scooter today.  His NP PCP has him on Ozempic for the last 3-4 months with 5 lbs of weight loss.  He goes on to state that he has persistent GERD that have altered his daily activities and sleep habits.        1/20/2025    10:55 AM 1/30/2024     4:45 PM 1/30/2024     4:00 PM 1/30/2024     3:45 PM 1/30/2024     3:30 PM 1/30/2024     3:29 PM 1/30/2024     3:15 PM   Ambulatory Bariatric Summary   Systolic 139 112 137 142 132  122   Diastolic 88 68 66 65 71  77   Pulse 69 71 68 71 72  71   Temp 97.1 °F (36.2 °C) 97.8 °F (36.6 °C)        Respirations  16 16 15 15 15 15   Weight - Scale 297.8         Height 1.753 m (5' 9.02\")         BMI 44 kg/m2         Weight - Scale 135.1 kg (297 lb 12.8 oz)         BMI (Calculated) 44           Surgery related complication: NA       Patients pain score: 0/10        1/20/2025    10:55 AM 1/30/2024     4:45 PM 1/30/2024     4:00 PM 1/30/2024     3:45 PM 1/30/2024     3:30 PM 1/30/2024     3:29 PM 1/30/2024     3:15 PM   Ambulatory Bariatric Summary   Systolic 139 112 137 142 132  122   Diastolic 88 68 66 65 71  77   Pulse 69 71 68 71 72  71   Temp 97.1 °F (36.2 °C) 97.8 °F (36.6 °C)

## 2025-01-21 ENCOUNTER — HOSPITAL ENCOUNTER (OUTPATIENT)
Facility: HOSPITAL | Age: 55
Discharge: HOME | End: 2025-01-24
Payer: COMMERCIAL

## 2025-01-21 PROCEDURE — 99213 OFFICE O/P EST LOW 20 MIN: CPT | Performed by: PSYCHIATRY & NEUROLOGY

## 2025-01-21 NOTE — PROGRESS NOTES
Consent: The patient and/or their healthcare decision maker is aware that they may receive a bill (including co-pays) for this audio only encounter, depending on their insurance coverage, and has provided verbal consent to proceed. The patient was located in [place], where I am licensed to provide care.     CC: \"I'm in a lot of pain--just had therapy\"     INTERVAL HISTORY (Audio Only):  Mr. Marrero is a 54-year-old  male following up with me 3 months since his last appointment concerning a longstanding history of Bipolar Disorder for which he developed some residual mixed and depressive symptoms since suffering an occipital lobe CVA on 08/27/16, secondary to vasculitis. He'd been much more stable for some time and we began to decrease the meds by dropping the Trileptal, and decreasing the Risperidone from 4 mg to 2 mg secondary to a gastric sleeve procedure done in early Feb 2019. We've since restarted the Trileptal (initially just 300 mg daily) due to some agitation and dysphoria, and more recently increased the Trazodone to 300 mg due to worsened insomnia. Since then, we've increased the Trileptal to 300 mg tid and changed the Clonazepam (rare use) to Diazepam at the 5 mg dose. Roughly 2.5 years ago, I increased the GBP and the Diazepam at the request of his Pain MD, and he was feeling \"pretty good\" the next time. However, since then he's been much more buchanan and irritable, much of it fueled by his chronic pain and debilitated condition. Nineteen months ago I increased the Cymbalta to 90 mg due to some worsening dysphoria, and the next 3 times he was progressively better. Five months ago his wife provided some history to suggest possible ADHD (and ASRS-v1.1 also positive), so I started Strattera and increased it last time.     He states that he's been feeling \"OK\" overall, and that he doesn't think he's been overly depressed or irritable lately, but he continues to deal with a lot of pain and other physical

## 2025-01-22 ENCOUNTER — HOSPITAL ENCOUNTER (OUTPATIENT)
Facility: HOSPITAL | Age: 55
Discharge: HOME | End: 2025-01-25
Payer: COMMERCIAL

## 2025-01-22 ENCOUNTER — HOSPITAL ENCOUNTER (OUTPATIENT)
Facility: HOSPITAL | Age: 55
Setting detail: OUTPATIENT SURGERY
Discharge: HOME | End: 2025-01-25
Payer: COMMERCIAL

## 2025-01-22 VITALS
DIASTOLIC BLOOD PRESSURE: 93 MMHG | HEIGHT: 69 IN | BODY MASS INDEX: 44.23 KG/M2 | HEART RATE: 90 BPM | TEMPERATURE: 97.8 F | SYSTOLIC BLOOD PRESSURE: 139 MMHG | RESPIRATION RATE: 18 BRPM | WEIGHT: 298.6 LBS

## 2025-01-22 DIAGNOSIS — E66.01 MORBID (SEVERE) OBESITY DUE TO EXCESS CALORIES: ICD-10-CM

## 2025-01-22 PROCEDURE — 74220 X-RAY XM ESOPHAGUS 1CNTRST: CPT

## 2025-01-22 PROCEDURE — 2500000003 HC RX 250 WO HCPCS: Performed by: SPECIALIST

## 2025-01-22 PROCEDURE — 74240 X-RAY XM UPR GI TRC 1CNTRST: CPT | Performed by: SPECIALIST

## 2025-01-22 PROCEDURE — 74240 X-RAY XM UPR GI TRC 1CNTRST: CPT

## 2025-01-22 RX ADMIN — BARIUM SULFATE 100 ML: 960 POWDER, FOR SUSPENSION ORAL at 13:47

## 2025-01-22 NOTE — PROCEDURES
[facility]    Upper GI Procedure Report      Rainer Marrero    Medical Record Number:[id number]    1970    Date of Service - January 22, 2025    Pre-Op Diagnosis - patient is status post sleeve resection performed by this office 6 years ago with complaint of weight regain and reflux. They now present for UGI to assess their post surgical anatomy.    Post-Op Diagnosis -same    Procedure - UGI study with barium    Surgeon - Bruce Rondon MD    Assistant - None    Complications - None    Specimens - None    Implants - None    Estimate Blood Loss - None    Statement of Medical Necessity - Need for radiologic evaluation prior to further management of their care..    Procedure -the patient was brought to the fluoroscopy suite where they were given thin barium.  On swallowing the barium the patient was noted to have normal peristalsis of their esophagus with progressive flow into the distal esophagus.  Specific findings of the distal esophagus revealed that they did not have a hiatal hernia. Contrast flowed normally through the esophagus and into a properly sized sleeve stomach without reflux or obstruction or signs of stricture. The stomach filled in a timely manner and emptied into the duodenum without issue or hesitation. The anatomy was normal for the timeframe with no stricture or obstruction or any other abnormality.  Given the benign findings of today's exam we will encourage him to lose weight to decrease his intra abdominal pressure as he has a massive amount of intra abdominal fatty tissue.  He is also taking several sedating medications that are contributing to his issues.  Given her steroid needs he is not a candidate for conversion to a gastric bypass.  He will meet with dietary staff to improve his diet.    Bruce Rondon MD

## 2025-01-22 NOTE — PROGRESS NOTES
[facility] UGI FOCUS NOTE      Date:  1/22/2025  Time:  1:44 PM    Patient:  Rainer Marrero  Procedure:  UGI      Patient Questions  Lap Band Adjustment Patient Questionnaire:  If female, are you pregnant? []Yes     [x]No  Tolerates thick liquids:  [x]Well   []1     []2     []3     []4     []5     []Poorly  Tolerates red meat:  [x]Well   []1     []2     []3     []4     []5     [] Poorly  Tolerates chicken:  [x]Well   []1     []2     []3     []4     []5     []Poorly  Tolerates fish:   [x]Well   []1     []2     []3     []4     []5     []Poorly  Hunger is:   []Well Controlled     [x]1     []2     []3     []4     []5      [] Poorly Controlled  Nightime regurgitation:  []Never     []1     []2     []3     []4     []5     [x]Frequently    Lap Band Info:  Band Type  []Realize     []Realize-C     []AP     []VG     []10cm     []Unknown  []Other      Comments:        Andria Singh RN

## 2025-02-05 ENCOUNTER — HOSPITAL ENCOUNTER (OUTPATIENT)
Facility: HOSPITAL | Age: 55
Discharge: HOME | End: 2025-02-08

## 2025-02-05 VITALS — WEIGHT: 292.6 LBS | BODY MASS INDEX: 43.34 KG/M2 | HEIGHT: 69 IN

## 2025-02-05 NOTE — PROGRESS NOTES
Medical Weight Loss Multi-Disciplinary Program    Patient's Name: Rainer Marrero Age: 54 y.o.   YOB: 1970 Sex: male      Session #1. Pt attended in-person class.  Weight obtained in office.    Date: 2/5/2025    Vitals:    02/05/25 1545   Weight: 132.7 kg (292 lb 9.6 oz)   Height: 1.753 m (5' 9\")      Body mass index is 43.21 kg/m².     Pounds Lost since last month: N/A   Pounds Gained since last month: N/A    Starting Weight: 297.8 lbs  Previous Month’s Weight: N/A  Overall Pounds Lost: 5.2 lbs  Overall Pounds Gained: 0 lbs      Class Guidelines    Guidelines are reviewed with patient at the start of every class.    1. Patient understands that weight loss trial classes must be consecutive.  Patient understands if they miss a class, it is their responsibility to contact me to reschedule class.  I will reach out to patient after their first no show.  2.  Patient understands the expectations that weight maintenance/weight loss is expected during the classes.  Failure to demonstrate changes may result in extension of weight loss trial, followed by returning to see the surgeon.  Patient understands that they CANNOT gain any weight during the weight loss trial.  Gaining weight will result in extension of weight loss trial.  3. Patient is also instructed to complete their labwork, psychological evaluation visit, and any other tests that the surgeon has used while they are working on their weight loss trial.  4.  Patient was instructed to bring their packet of nutrition education materials to every class and appointment.        Eating Habits and Behaviors    Today in class we reviewed the Key Diet Principles.  Patient was encouraged to consume 3 meals each day, and the timing the meals was reviewed.  Meal time behaviors that will help pt to be successful with their weight loss efforts were additionally reviewed.      We discussed the importance of drinking adequate amounts of fluids, recommending that

## 2025-03-03 ENCOUNTER — TRANSCRIBE ORDERS (OUTPATIENT)
Facility: HOSPITAL | Age: 55
End: 2025-03-03

## 2025-03-03 DIAGNOSIS — Z96.653 PRESENCE OF BILATERAL TOTAL KNEE JOINT PROSTHESES: Primary | ICD-10-CM

## 2025-03-06 ENCOUNTER — TELEPHONE (OUTPATIENT)
Facility: HOSPITAL | Age: 55
End: 2025-03-06

## 2025-03-06 ENCOUNTER — HOSPITAL ENCOUNTER (OUTPATIENT)
Facility: HOSPITAL | Age: 55
Discharge: HOME | End: 2025-03-09

## 2025-03-06 VITALS — WEIGHT: 282.9 LBS | BODY MASS INDEX: 41.9 KG/M2 | HEIGHT: 69 IN

## 2025-03-06 NOTE — TELEPHONE ENCOUNTER
Has felt more dysphoric, and particularly more irritable since stopping the Trileptal. He would like to restart it, which I agreed with. Will restart the 600 mg BID dose--has 2 months of refills.

## 2025-03-06 NOTE — PROGRESS NOTES
Medical Weight Loss Multi-Disciplinary Program    Patient's Name: Rainer Marrero Age: 54 y.o.   YOB: 1970 Sex: male     Session# 2. Pt attended in-person class.  Weight obtained in office.    Date: 3/6/2025    Vitals:    03/06/25 1545   Weight: 128.3 kg (282 lb 14.4 oz)   Height: 1.753 m (5' 9\")      Body mass index is 41.78 kg/m².     Pounds Lost since last month: 9.7 lbs          Pounds Gained since last month: 0.0 lbs    Starting Weight: 297.8 lbs     Previous Month’s Weight: 292.6 lbs  Overall Pounds Lost: 14.9 lbs     Overall Pounds Gained: 0.0 lbs        Class Guidelines    Guidelines are reviewed with patient at the start of every class.    1. Patient understands that weight loss trial classes must be consecutive.  Patient understands if they miss a class, it is their responsibility to contact me to reschedule class.  I will reach out to patient after their first no show.  2.  Patient understands the expectations that weight maintenance/weight loss is expected during the classes.  Failure to demonstrate changes may result in one extra month of weight loss trial, followed by going back to see the surgeon.  Patient understands that they CAN NOT gain any weight during the weight loss trial.  Gaining weight will result in extra classes.  3. Patient is also instructed to be doing their labs, blood work, psych visit, support group and any other test that the surgeon has used while they are working on their weight loss trial.  4.  Patient was instructed to bring their blue folder to every class and appointment.      Eating Habits and Behaviors    Today in class, we reviewed the key diet principles.  Pt was encouraged to consume 3 meals each day; the timing the meals was reviewed.  Meal time behaviors that will help pt to be successful with their weight loss efforts were additionally reviewed.     RD discussed the importance of adequate fluids, recommending that pt consume a minimum of 64 oz of

## 2025-03-07 ENCOUNTER — HOSPITAL ENCOUNTER (OUTPATIENT)
Facility: HOSPITAL | Age: 55
Discharge: HOME | End: 2025-03-10
Attending: ORTHOPAEDIC SURGERY
Payer: COMMERCIAL

## 2025-03-07 DIAGNOSIS — Z96.653 PRESENCE OF BILATERAL TOTAL KNEE JOINT PROSTHESES: ICD-10-CM

## 2025-03-07 PROCEDURE — 73721 MRI JNT OF LWR EXTRE W/O DYE: CPT

## 2025-03-19 ENCOUNTER — HOSPITAL ENCOUNTER (OUTPATIENT)
Facility: HOSPITAL | Age: 55
Discharge: HOME | End: 2025-03-22
Payer: COMMERCIAL

## 2025-03-19 DIAGNOSIS — F31.61 BIPOLAR AFFECTIVE DISORDER, MIXED, MILD (HCC): ICD-10-CM

## 2025-03-19 PROCEDURE — 99214 OFFICE O/P EST MOD 30 MIN: CPT | Performed by: PSYCHIATRY & NEUROLOGY

## 2025-03-19 RX ORDER — OXCARBAZEPINE 600 MG/1
600 TABLET, FILM COATED ORAL 2 TIMES DAILY
Qty: 180 TABLET | Refills: 3 | Status: SHIPPED | OUTPATIENT
Start: 2025-03-19

## 2025-03-19 RX ORDER — GABAPENTIN 600 MG/1
1200 TABLET ORAL 3 TIMES DAILY
Qty: 540 TABLET | Refills: 1 | Status: SHIPPED | OUTPATIENT
Start: 2025-03-19 | End: 2025-09-15

## 2025-03-19 RX ORDER — DULOXETIN HYDROCHLORIDE 60 MG/1
60 CAPSULE, DELAYED RELEASE ORAL DAILY
Qty: 90 CAPSULE | Refills: 3 | Status: SHIPPED | OUTPATIENT
Start: 2025-03-19

## 2025-03-19 RX ORDER — QUETIAPINE FUMARATE 300 MG/1
300 TABLET, FILM COATED ORAL NIGHTLY
Qty: 90 TABLET | Refills: 3 | Status: SHIPPED | OUTPATIENT
Start: 2025-03-19

## 2025-03-19 RX ORDER — RISPERIDONE 2 MG/1
2 TABLET ORAL
Qty: 90 TABLET | Refills: 3 | Status: SHIPPED | OUTPATIENT
Start: 2025-03-19

## 2025-03-19 RX ORDER — BUPROPION HYDROCHLORIDE 300 MG/1
300 TABLET ORAL EVERY MORNING
Qty: 90 TABLET | Refills: 3 | Status: SHIPPED | OUTPATIENT
Start: 2025-03-19

## 2025-03-19 RX ORDER — TRAZODONE HYDROCHLORIDE 150 MG/1
150 TABLET ORAL NIGHTLY
Qty: 90 TABLET | Refills: 3 | Status: SHIPPED | OUTPATIENT
Start: 2025-03-19

## 2025-03-19 RX ORDER — DULOXETIN HYDROCHLORIDE 30 MG/1
30 CAPSULE, DELAYED RELEASE ORAL DAILY
Qty: 90 CAPSULE | Refills: 3 | Status: SHIPPED | OUTPATIENT
Start: 2025-03-19

## 2025-03-19 NOTE — PROGRESS NOTES
III   Occipital CVA d/t vasculitis; severe DJD with chronic pain; HTN; obesity; HLD; migraines; and GERD.     PLAN:  D/C the  at this point (?agitation).  Continue the Risperidone at 2 mg qhs--#90 with 3 refills (90 day)  Continue the Trileptal at 600 mg bid--#180 with 3 refills (90 day)   Continue the Gabapentin at 1200 mg tid--#540 with 1 refill (90 day)  Continue the Trazodone at 150 mg qhs prn--#90 with 3 refills (90 day)  Continue the Seroquel at 300 mg qhs--#90 with 3 refills (90 day)  Continue the Cymbalta at 90 mg daily (60 and 30)--#90 with 3 refills of each (90 day)  Continue the Wellbutrin XL at 300 mg daily--#90 with 3 refills (90 day)  Continue the Diazepam at 5 mg qid prn--has 3 months of refills (30 day)  Follow up with me in 2 months, sooner if needed.

## 2025-04-02 RX ORDER — DULOXETIN HYDROCHLORIDE 30 MG/1
30 CAPSULE, DELAYED RELEASE ORAL DAILY
Qty: 90 CAPSULE | Refills: 3 | Status: SHIPPED | OUTPATIENT
Start: 2025-04-02

## 2025-04-03 ENCOUNTER — HOSPITAL ENCOUNTER (OUTPATIENT)
Facility: HOSPITAL | Age: 55
Discharge: HOME | End: 2025-04-06

## 2025-04-03 VITALS — WEIGHT: 289.4 LBS | HEIGHT: 69 IN | BODY MASS INDEX: 42.86 KG/M2

## 2025-04-03 NOTE — PROGRESS NOTES
Medical Weight Loss Multi-Disciplinary Program    Patient's Name: Rainer Marrero Age: 54 y.o.   YOB: 1970 Sex: male     Session #3. Pt attended in-person class.  Weight obtained in office.    Date: 4/3/2025    Vitals:    04/03/25 1541   Weight: 131.3 kg (289 lb 6.4 oz)   Height: 1.753 m (5' 9\")      Body mass index is 42.74 kg/m².              Pounds Lost since last month: 0.0 lbs  Pounds Gained since last month: 6.5 lbs       Starting Weight: 297.8 lbs Previous Month’s Weight: 282.9 lbs   Overall Pounds Lost: 8.4 lbs Overall Pounds Gained: 0.0 lbs           Class Guidelines    Guidelines are reviewed with patient at the start of every class.    1. Patient understands that weight loss trial classes must be consecutive.  Patient understands if they miss a class, it is their responsibility to contact me to reschedule class.  I will reach out to patient after their first no show.  2.  Patient understands the expectations that weight maintenance/weight loss is expected during the classes.  Failure to demonstrate changes may result in extension of weight loss trial, followed by returning to see the surgeon.  Patient understands that they CANNOT gain any weight during the weight loss trial.  Gaining weight will result in extension of weight loss trial.  3. Patient is also instructed to complete their labwork, psychological evaluation visit, and any other tests that the surgeon has used while they are working on their weight loss trial.  4.  Patient was instructed to bring their packet of nutrition education materials to every class and appointment.        Eating Habits and Behaviors    Today in class we reviewed the Key Diet Principles.  Patient was encouraged to consume 3 meals each day, and the timing the meals was reviewed.  Meal time behaviors that will help pt to be successful with their weight loss efforts were additionally reviewed.      We discussed the importance of drinking adequate amounts of

## 2025-04-28 DIAGNOSIS — F31.61 BIPOLAR DISORDER, CURRENT EPISODE MIXED, MILD (HCC): Primary | ICD-10-CM

## 2025-04-28 RX ORDER — DIAZEPAM 5 MG/1
5 TABLET ORAL 4 TIMES DAILY PRN
Qty: 120 TABLET | Refills: 5 | Status: SHIPPED | OUTPATIENT
Start: 2025-04-28 | End: 2025-10-25

## 2025-05-22 ENCOUNTER — HOSPITAL ENCOUNTER (OUTPATIENT)
Facility: HOSPITAL | Age: 55
Discharge: HOME | End: 2025-05-25
Payer: COMMERCIAL

## 2025-05-22 PROCEDURE — 99214 OFFICE O/P EST MOD 30 MIN: CPT | Performed by: PSYCHIATRY & NEUROLOGY

## 2025-05-22 NOTE — PROGRESS NOTES
Consent: The patient and/or their healthcare decision maker is aware that they may receive a bill (including co-pays) for this audio only encounter, depending on their insurance coverage, and has provided verbal consent to proceed. The patient was located in [place], where I am licensed to provide care.     CC: \"I'm OK\"     INTERVAL HISTORY (Audio Only):  Mr. Marrero is a 54-year-old  male following up with me 2 months since his last appointment concerning a longstanding history of Bipolar Disorder for which he developed some residual mixed and depressive symptoms since suffering an occipital lobe CVA on 08/27/16, secondary to vasculitis. He'd been stable for some time and we began to decrease the meds by dropping the Trileptal, and decreasing the Risperidone from 4 mg to 2 mg secondary to a gastric sleeve procedure done in early Feb 2019. We've since restarted the Trileptal due to some agitation and dysphoria, and later increased the Trazodone to 300 mg due to worsened insomnia. Since then, we've steadily increased the Trileptal and changed the Clonazepam (rare use) to Diazepam at the 5 mg dose. Over 2.5 years ago, I increased the GBP and the Diazepam at the request of his Pain MD, and he was feeling \"pretty good\" the next time. However, since then he's been much more buchanan and irritable, much of it fueled by his chronic pain and debilitated condition. Twenty one months ago I increased the Cymbalta to 90 mg due to some worsening dysphoria, and the next 3 times he was progressively better. Seven months ago his wife provided some history to suggest possible ADHD (and ASRS-v1.1 also positive), so I started and increased Strattera, but we stopped it last time due to possible agitation. He'd also stopped the Trileptal, but we restarted it after his agitation worsened.     He states that he's generally been feeling \"about the same\" overall, but by his description, he seems to be less irritable and agitated now, with

## 2025-06-16 ENCOUNTER — HOSPITAL ENCOUNTER (OUTPATIENT)
Facility: HOSPITAL | Age: 55
Discharge: HOME | End: 2025-06-19
Payer: COMMERCIAL

## 2025-06-16 PROCEDURE — 99213 OFFICE O/P EST LOW 20 MIN: CPT | Performed by: PSYCHIATRY & NEUROLOGY

## 2025-06-16 NOTE — PROGRESS NOTES
Consent: The patient and/or their healthcare decision maker is aware that they may receive a bill (including co-pays) for this audio only encounter, depending on their insurance coverage, and has provided verbal consent to proceed. The patient was located in [place], where I am licensed to provide care.      CC: \"tremors\"     INTERVAL HISTORY (Audio Only):  Mr. Marrero is a 54-year-old  male following up with me 3 weeks since his last appointment concerning a longstanding history of Bipolar Disorder for which he developed some residual mixed and depressive symptoms since suffering an occipital lobe CVA on 08/27/16, secondary to vasculitis. He'd been stable for some time and we began to decrease the meds by dropping the Trileptal, and decreasing the Risperidone from 4 mg to 2 mg secondary to a gastric sleeve procedure done in early Feb 2019. We've since restarted the Trileptal due to some agitation and dysphoria, and later increased the Trazodone to 300 mg due to worsened insomnia. Since then, we've steadily increased the Trileptal and changed the Clonazepam (rare use) to Diazepam at the 5 mg dose. Over 2.5 years ago, I increased the GBP and the Diazepam at the request of his Pain MD, and he was feeling \"pretty good\" the next time. However, since then he's been much more buchanan and irritable, much of it fueled by his chronic pain and debilitated condition. Twenty two months ago I increased the Cymbalta to 90 mg due to some worsening dysphoria, and the next 3 times he was progressively better. Eight months ago his wife provided some history to suggest possible ADHD (and ASRS-v1.1 also positive), so I started and increased Strattera, but we stopped it last time due to possible agitation. He'd also stopped the Trileptal, but we restarted it after his agitation worsened.     He states that he's mostly been feeling \"about the same\", although he's been shaking more lately. At it's worst, he can't hold a glass or a

## 2025-07-17 ENCOUNTER — HOSPITAL ENCOUNTER (OUTPATIENT)
Facility: HOSPITAL | Age: 55
Discharge: HOME | End: 2025-07-20
Payer: COMMERCIAL

## 2025-07-17 PROCEDURE — 99214 OFFICE O/P EST MOD 30 MIN: CPT | Performed by: PSYCHIATRY & NEUROLOGY

## 2025-07-17 NOTE — PROGRESS NOTES
has been less intense and less frequent by his report. He denied feeling hopeless or having any suicidal thoughts at all. There was also no evidence of any anxiety, or any psychosis such as hallucinations or delusions. His judgment and insight appeared to be fairly good.  His cognitive functioning was relatively stable and unchanged, with some deficits noted in the areas of concentration and attention span.      DIAGNOSTIC IMPRESSION:  Axis I     1.  Bipolar Disorder, mixed, mild now (F31.61).                 2.  Possible ADHD, inattentive type (F90.0)                 3.  Alcohol Use Disorder in remission for many years (F10.21).  Axis II     Deferred.  Axis III   Occipital CVA d/t vasculitis; severe DJD with chronic pain; HTN; obesity; HLD; migraines; and GERD.     PLAN:  Continue off the  for now.  Continue the Trileptal at 600 mg bid--has 8 months of refills (90 day)   Continue the Gabapentin at 1200 mg tid--has 2 months of refills (90 day)  Continue the Trazodone at 150 mg qhs prn--has 8 months of refills (90 day)  Continue the Seroquel at 300 mg qhs--has 8 months of refills (90 day)  Continue the Cymbalta at 90 mg daily (60 and 30)--has 8 months of refills of each (90 day)  Continue the Wellbutrin XL at 300 mg daily--has 8 months of refills (90 day)  Continue the Diazepam at 5 mg qid prn--has 3.5 months of refills (30 day)  Follow up with me in 3 months, sooner if needed.     I affirm this is a Patient-Initiated-Episode with a patient who has not had a related appointment within my department in the past 7 days or scheduled within the next 24 hours.  Total Time: 30 minutes  Note: not billable if the call serves to triage the patient into an appointment for the relevant concern. Patient was evaluated by phone call and had no access to a computer or smart phone. Chart reviewed. Evaluated and management per the note above. POS: patient at home; provider in office.

## (undated) DEVICE — PREP SKN PREVAIL 40ML APPL --

## (undated) DEVICE — BARIATRIC: Brand: MEDLINE INDUSTRIES, INC.

## (undated) DEVICE — STERILE POLYISOPRENE POWDER-FREE SURGICAL GLOVES: Brand: PROTEXIS

## (undated) DEVICE — BLADE SAW 1.19X20X90 MM FOR LG BNE

## (undated) DEVICE — THE CANADY HYBRID PLASMA SCALPEL IS AN ELECTROSURGICAL PLASMA SCALPEL THAT USES AN 85MM BENDABLE PADDLE BLADE TIP. THE ELECTROSURGICAL PLASMA SCALPEL IS USED TO SIMULTANEOUSLY CUT AND COAGULATE BIOLOGICAL TISSUE.: Brand: CANADY HYBRID PLASMA PADDLE BLADE

## (undated) DEVICE — HANDPIECE SET WITH HIGH FLOW TIP AND SUCTION TUBE: Brand: INTERPULSE

## (undated) DEVICE — PIN GUIDE FIX 3.2X62 MM SCREW GS903A0620322P] KOMET MEDICAL]

## (undated) DEVICE — TROCAR ENDOSCP L100MM DIA12MM STBL SL BLDELSS ENDOPATH XCEL

## (undated) DEVICE — NDL SPNE QNCKE 18GX3.5IN LF --

## (undated) DEVICE — SUTURE STRATAFIX SYMMETRIC PDS + 1 SGL ARMED CT 18 IN LEN SXPP1A405

## (undated) DEVICE — SOLUTION IV 100ML 0.9% SOD CHL DIL INJ

## (undated) DEVICE — DRAIN SURG 15FR L3/16IN SIL RND 3/4 FLUT 3/16IN TRCR

## (undated) DEVICE — SEALANT HEMSTAT 5ML HUM FIBRIN THROM 2 VI APPL DEV EVICEL

## (undated) DEVICE — TIP APPL L35CM RIG FOR SEAL EVICEL

## (undated) DEVICE — SOL IRRIGATION INJ NACL 0.9% 500ML BTL

## (undated) DEVICE — SUTURE VCRL + SZ 2-0 L36IN ABSRB UD L36MM CT-1 1/2 CIR VCP945H

## (undated) DEVICE — GARMENT COMPR L FOR 13-16IN FT INTMIT SGL BLDR HEM FORC II

## (undated) DEVICE — STAPLER SKIN L440MM 32MM LNG 12 FIRING B FRM PWR + GRIPPING

## (undated) DEVICE — SUTURE ETHIB EXCL BR GRN TAPR PT 2-0 30 X563H X563H

## (undated) DEVICE — SOL INJ L R 1000ML BG --

## (undated) DEVICE — STRYKER PERFORMANCE SERIES SAGITTAL BLADE: Brand: STRYKER PERFORMANCE SERIES

## (undated) DEVICE — NEEDLE INSUF L150MM DIA2MM DISP FOR PNEUMOPERI ENDOPATH

## (undated) DEVICE — Device

## (undated) DEVICE — SOLUTION SCRB 4OZ 10% PVP I POVIDONE IOD TOP PAINT EXIDINE

## (undated) DEVICE — SUT MONOCRYL PLUS UD 4-0 --

## (undated) DEVICE — PIN GUIDE FIX 3.2X62 MM SCREW GS9030620324P] KOMET MEDICAL]

## (undated) DEVICE — KENDALL SCD EXPRESS SLEEVES, KNEE LENGTH, MEDIUM: Brand: KENDALL SCD

## (undated) DEVICE — TROCAR LAP L100MM DIA5MM BLDELSS W/ STBL SL ENDOPATH XCEL

## (undated) DEVICE — SLEEVE TRCR L100MM DIA5MM UNIV STBL FOR BLDELSS DIL TIP

## (undated) DEVICE — DRSG MEPILES BORDER AG 4X12 -- 5/BX

## (undated) DEVICE — ELECTRODE PT RET AD L9FT HI MOIST COND ADH HYDRGEL CORDED

## (undated) DEVICE — MEDI-VAC NON-CONDUCTIVE SUCTION TUBING: Brand: CARDINAL HEALTH

## (undated) DEVICE — AMD ANTIMICROBIAL DRAIN SPONGES, 6 PLY, 0.2% POLYHEXAMETHYLENE BIGUANIDE HCI (PHMB): Brand: EXCILON

## (undated) DEVICE — VISIGI 3D®  CALIBRATION SYSTEM  SIZE 36FR STD W/ BULB: Brand: BOEHRINGER® VISIGI 3D™ SLEEVE GASTRECTOMY CALIBRATION SYSTEM, SIZE 36FR W/BULB

## (undated) DEVICE — RELOAD STPL L60MM H2.3-4.2MM VERY THCK TISS BLK 6 ROW

## (undated) DEVICE — ENDO CARRY-ON PROCEDURE KIT INCLUDES ENZYMATIC SPONGE, GAUZE, BIOHAZARD LABEL, TRAY, LUBRICANT, DIRTY SCOPE LABEL, WATER LABEL, TRAY, DRAWSTRING PAD, AND DEFENDO 4-PIECE KIT.: Brand: ENDO CARRY-ON PROCEDURE KIT

## (undated) DEVICE — NEEDLE SPNL 20GA L3.5IN YEL HUB S STL REG WALL FIT STYL W/

## (undated) DEVICE — SHEAR HARMONIC 5MMX45CM -- ACE 7+

## (undated) DEVICE — SUTURE VCRL + SZ 1 L36IN ABSRB UD L36MM CT-1 1/2 CIR VCP947H

## (undated) DEVICE — SOLUTION IV 1000ML LAC RINGERS PH 6.5 INJ USP VIAFLX PLAS

## (undated) DEVICE — SOLUTION LACTATED RINGERS INJECTION USP

## (undated) DEVICE — SUT VCRL + 1 36IN CT1 VIO --

## (undated) DEVICE — PIN GUIDE FIX 3.2X62 MM SCREW [GS9030620324P] [KOMET MEDICAL]

## (undated) DEVICE — PIN GUIDE FIX 3.2X62 MM SCREW [GS903A0620322P] [KOMET MEDICAL]

## (undated) DEVICE — SUTURE ETHLN SZ 3-0 L30IN NONABSORBABLE BLK FSL L30MM 3/8 1671H

## (undated) DEVICE — TROCAR ENDOSCP L100MM DIA15MM BLDELSS STBL SL ENDOPATH XCEL

## (undated) DEVICE — BLADE SAW W13XL90MM 1.19MM PARA

## (undated) DEVICE — SUT VCRL + 2-0 36IN CT1 UD --

## (undated) DEVICE — REM POLYHESIVE ADULT PATIENT RETURN ELECTRODE: Brand: VALLEYLAB

## (undated) DEVICE — SOLUTION IRRIG 500ML 0.9% SOD CHLO USP POUR PLAS BTL

## (undated) DEVICE — SUTURE PDS II SZ 0 L27IN ABSRB VLT L26MM CT-2 1/2 CIR Z334H

## (undated) DEVICE — 3 BONE CEMENT MIXER: Brand: MIXEVAC

## (undated) DEVICE — TROCAR ENDOSCP BLDELSS 12X100 MM W/ HNDL STBL SL OPT TIP

## (undated) DEVICE — SYR IRR CATH TIP LR ADPT 70ML -- CONVERT TO ITEM 363120

## (undated) DEVICE — 4-PORT MANIFOLD: Brand: NEPTUNE 2

## (undated) DEVICE — ZIP 16 SURGICAL SKIN CLOSURE DEVICE: Brand: ZIP 16 SURGICAL SKIN CLOSURE DEVICE

## (undated) DEVICE — DEVON™ KNEE AND BODY STRAP 60" X 3" (1.5 M X 7.6 CM): Brand: DEVON

## (undated) DEVICE — FORCEPS BX OVL CUP SERR DISP CAP L 240CM RAD JAW 4

## (undated) DEVICE — MAJ-1414 SINGLE USE ADPATER BIOPSY VALV: Brand: SINGLE USE ADAPTOR BIOPSY VALVE

## (undated) DEVICE — GOWN ISOLATN REG BLU POLY UNISX W/ THMB LOOP

## (undated) DEVICE — APPLIER CLP L SHFT DIA12MM 20 ROT MULT LIGACLP